# Patient Record
Sex: FEMALE | Race: WHITE | Employment: FULL TIME | URBAN - METROPOLITAN AREA
[De-identification: names, ages, dates, MRNs, and addresses within clinical notes are randomized per-mention and may not be internally consistent; named-entity substitution may affect disease eponyms.]

---

## 2017-01-02 ENCOUNTER — OFFICE VISIT (OUTPATIENT)
Dept: FAMILY MEDICINE | Facility: CLINIC | Age: 47
End: 2017-01-02
Payer: COMMERCIAL

## 2017-01-02 VITALS
TEMPERATURE: 97.8 F | BODY MASS INDEX: 36.6 KG/M2 | WEIGHT: 239 LBS | OXYGEN SATURATION: 96 % | HEART RATE: 106 BPM | SYSTOLIC BLOOD PRESSURE: 134 MMHG | DIASTOLIC BLOOD PRESSURE: 80 MMHG

## 2017-01-02 DIAGNOSIS — E66.01 MORBID OBESITY DUE TO EXCESS CALORIES (H): ICD-10-CM

## 2017-01-02 DIAGNOSIS — E11.9 TYPE 2 DIABETES MELLITUS WITHOUT COMPLICATION, WITH LONG-TERM CURRENT USE OF INSULIN (H): ICD-10-CM

## 2017-01-02 DIAGNOSIS — K76.0 NON-ALCOHOLIC FATTY LIVER DISEASE: ICD-10-CM

## 2017-01-02 DIAGNOSIS — D64.89 ANEMIA DUE TO OTHER CAUSE: ICD-10-CM

## 2017-01-02 DIAGNOSIS — R61 GENERALIZED HYPERHIDROSIS: ICD-10-CM

## 2017-01-02 DIAGNOSIS — F41.9 ANXIETY: ICD-10-CM

## 2017-01-02 DIAGNOSIS — E83.42 HYPOMAGNESEMIA: ICD-10-CM

## 2017-01-02 DIAGNOSIS — G35 MS (MULTIPLE SCLEROSIS) (H): ICD-10-CM

## 2017-01-02 DIAGNOSIS — Z79.4 TYPE 2 DIABETES MELLITUS WITHOUT COMPLICATION, WITH LONG-TERM CURRENT USE OF INSULIN (H): ICD-10-CM

## 2017-01-02 PROCEDURE — 99207 C FOOT EXAM  NO CHARGE: CPT | Performed by: INTERNAL MEDICINE

## 2017-01-02 PROCEDURE — 99214 OFFICE O/P EST MOD 30 MIN: CPT | Performed by: INTERNAL MEDICINE

## 2017-01-02 RX ORDER — TERIFLUNOMIDE 14 MG/1
TABLET, FILM COATED ORAL DAILY
COMMUNITY
End: 2017-10-12

## 2017-01-02 RX ORDER — VENLAFAXINE HYDROCHLORIDE 75 MG/1
75 CAPSULE, EXTENDED RELEASE ORAL DAILY
Qty: 90 CAPSULE | Refills: 3 | Status: SHIPPED | OUTPATIENT
Start: 2017-01-02 | End: 2017-03-24

## 2017-01-02 ASSESSMENT — PAIN SCALES - GENERAL: PAINLEVEL: NO PAIN (0)

## 2017-01-02 NOTE — PROGRESS NOTES
SUBJECTIVE:  Maranda Mallory, a 46 year old female scheduled an appointment to discuss the following issues:  Blood sugars continue to be elevated.  Lowest is 150 and highest is 320  Blood sugar in the am are in the 220 range.  The 150 low would be a couple hours after breakfast.   She is having less weakness in her legs.  Is going to see the [facility] of neurology from now on.  Her MS is definitely improving.  Her anxiety is improved as well.  She worries less.  Is thinking of joining a support group.  She is going to lose weight and exercise.    She will have sudden onset diaphoresis and feel hot.  This has been for 1 month.  Just started new MS med 2 days ago.    Periods are normal.    The patient denies abdominal or flank pain, anorexia, nausea or vomiting, dysphagia, change in bowel habits or black or bloody stools or weight loss.   The patient deniesnof typical reflux symptoms: burning sensation after heavy meals, especially when lying down, sometimes with true waterbrash. Denies dysphagia, black or bloody stools or abdominal pain.     Screening for diabetic peripheral neuropathy  Generalized hyperhidrosis  Elevation of level of transaminase or lactic acid dehydrogenase (LDH)  Non-alcoholic fatty liver disease  Type 2 diabetes mellitus without complication, with long-term current use of insulin (H)  Morbid obesity due to excess calories (H)  Anxiety  MS (multiple sclerosis) (H)  Hypomagnesemia  Anemia due to other cause    Medical, social, surgical, and family histories reviewed by myself.  Past Medical History   Diagnosis Date     Depressive disorder, not elsewhere classified 1995     Sees a therapist but no meds     Urinary tract infection, site not specified      Recurrent UTI's couple times a year     Gestational diabetes      Anxiety      Allergic rhinitis 5/19/2009     Skin Tests- Cat/Dog/DM/T/G/W     Acne      Type II or unspecified type diabetes mellitus without mention of complication, not stated  as uncontrolled      Allergies      Hypertension      borderline     Type 2 diabetes mellitus (H)      diet controlled     Mixed hyperlipidemia      Takes Lipitor     Vitamin D deficiency disease 5/23/2013     Bacterial vaginosis 9/13/2011     Chronic, sometimes with yeast  3/20/12: + BV and + yeast--treated with Metronidazole X 10 days and Diflucan X 3 doses to be taken afterward.  [facility]      Esophageal dysmotility 1/29/2014     Goes to the [facility] - will get food stuck in throat and will need EGD to remove it     Sliding hiatal hernia 1/29/2014     Heart murmur 1/29/2014     High risk HPV infection 2/2016     LSIL +HPV 16     H/O colposcopy with cervical biopsy 3/7/16     LSIL, can't exclude HSIL       Current Outpatient Prescriptions   Medication Sig Dispense Refill     teriflunomide (AUBAGIO) 14 MG tablet Take by mouth daily Only available through select specialty pharmacies       insulin glargine (LANTUS) 100 UNIT/ML injection Inject 22 Units Subcutaneous 2 times daily Patient doesn't need this right now. 3 Month 1     venlafaxine (EFFEXOR-XR) 75 MG 24 hr capsule Take 1 capsule (75 mg) by mouth daily 90 capsule 3     exenatide ER (BYDUREON) 2 MG recon vial kit susp for weekly inj Inject 2 mg Subcutaneous every 7 days 1 kit 1     pantoprazole (PROTONIX) 40 MG EC tablet Take 1 tablet (40 mg) by mouth daily Take 30-60 minutes before a meal. 90 tablet 1     CarBAMazepine (TEGRETOL PO) Take 50 mg by mouth       insulin aspart (NOVOLOG FLEXPEN) 100 UNIT/ML injection Inject 15 Units Subcutaneous 3 times daily (with meals) 3 Month 1     omeprazole (PRILOSEC) 40 MG capsule TAKE ONE CAPSULE BY MOUTH TWICE DAILY 180 capsule 3     rosuvastatin (CRESTOR) 10 MG tablet Take 1 tablet (10 mg) by mouth daily 30 tablet 2     Sennosides-Docusate Sodium (SENNA-PLUS PO) Take by mouth 2 times daily 2 tabs       blood glucose monitoring (NO BRAND SPECIFIED) meter device kit Use to test blood sugar 3 times daily or as directed. 1 kit 11      blood glucose monitoring (NO BRAND SPECIFIED) test strip Use to test blood sugars 3 times daily or as directed 3 Box 11     insulin pen needle 31G X 6 MM Use 4 daily or as directed. 300 each 11     Cholecalciferol (VITAMIN D) 2000 UNITS tablet Take 4,000 Units by mouth daily 180 tablet 3     metFORMIN (GLUCOPHAGE-XR) 500 MG 24 hr tablet Take 2 tablets (1,000 mg) by mouth 2 times daily (with meals) 120 tablet 3     insulin pen needle (BD ULTRA-FINE) 29G X 12.7MM Use once daily with victoza pen 100 each 11     insulin pen needle (BD SAMANTHA U/F) 32G X 4 MM Use once daily or as directed. 100 each 3     blood glucose monitoring (NO BRAND SPECIFIED) meter device kit Use to test blood sugar 1 times daily or as directed.ok for 3 month supply with 3 refills on test strips. 1 kit 11     Urea 20 % CREA To dry and scaly feet twice a day as needed. 120 g 11     ACE NOT PRESCRIBED, INTENTIONAL, ACE Inhibitor not prescribed due to Other:no microalbuminuria 0 each 0     diltiazem (CARDIZEM CD) 120 MG 24 hr CD capsule Take 1 capsule (120 mg) by mouth daily 30 capsule 1     glucose blood VI test strips (FREESTYLE LITE) strip Use to test blood sugars 2 times daily or as directed. 1 Month prn     FREESTYLE LANCETS MISC Use to test blood sugars 2 times daily or as directed. 60 each prn     Blood Glucose Calibration (FREESTYLE CONTROL SOLUTION) LIQD Use to calibrate blood glucose monitor as directed. 1 each 0     aspirin 81 MG tablet Take by mouth daily 30 tablet 3     [DISCONTINUED] insulin glargine (LANTUS) 100 UNIT/ML injection Inject 20 Units Subcutaneous 2 times daily 3 Month 1     [DISCONTINUED] venlafaxine (EFFEXOR-XR) 75 MG 24 hr capsule TAKE ONE CAPSULE BY MOUTH EVERY DAY 30 capsule 3       ROS:   ROS: 10 point ROS neg other than the symptoms noted above in the HPI.   All other systems were reviewed and are negative.    OBJECTIVE:  /80 mmHg  Pulse 106  Temp(Src) 97.8  F (36.6  C) (Oral)  Wt 239 lb (108.41 kg)  SpO2  96%  LMP 12/20/2016 (Exact Date)  Breastfeeding? No  EXAM:  GENERAL APPEARANCE: healthy, alert and no distress  NECK: no adenopathy, no asymmetry, masses, or scars and thyroid normal to palpation  RESP: lungs clear to auscultation - no rales, rhonchi or wheezes  CV: regular rates and rhythm and normal S1 S2, no S3 or S4  ABDOMEN:  soft, nontender, no HSM or masses and bowel sounds normal  SKIN: no suspicious lesions or rashes  PSYCH: mentation appears normal. and affect anxious but dramatically improved from prior visits    Extremities- edema No  1+ dorsalis pedis pulses bilaterally    Office Visit on 11/30/2016   Component Date Value Ref Range Status     Color Urine 11/30/2016 Yellow   Final     Appearance Urine 11/30/2016 Clear   Final     Glucose Urine 11/30/2016 >=1000* NEG mg/dL Final     Bilirubin Urine 11/30/2016 Negative  NEG Final     Ketones Urine 11/30/2016 Trace* NEG mg/dL Final     Specific Gravity Urine 11/30/2016 1.025  1.003 - 1.035 Final     Blood Urine 11/30/2016 Trace* NEG Final     pH Urine 11/30/2016 5.5  5.0 - 7.0 pH Final     Protein Albumin Urine 11/30/2016 Negative  NEG mg/dL Final     Urobilinogen Urine 11/30/2016 0.2  0.2 - 1.0 EU/dL Final     Nitrite Urine 11/30/2016 Negative  NEG Final     Leukocyte Esterase Urine 11/30/2016 Small* NEG Final     Source 11/30/2016 Midstream Urine   Final     Glucose Whole Blood 11/30/2016 363* 70 - 99 mg/dL Final     WBC Urine 11/30/2016 2-5* 0 - 2 /HPF Final     RBC Urine 11/30/2016 O - 2  0 - 2 /HPF Final     Squamous Epithelial /LPF Urine 11/30/2016 Few  FEW /LPF Final     Bacteria Urine 11/30/2016 Moderate* NEG /HPF Final     WBC 11/30/2016 9.6  4.0 - 11.0 10e9/L Final     RBC Count 11/30/2016 4.75  3.8 - 5.2 10e12/L Final     Hemoglobin 11/30/2016 11.5* 11.7 - 15.7 g/dL Final     Hematocrit 11/30/2016 37.3  35.0 - 47.0 % Final     MCV 11/30/2016 79  78 - 100 fl Final     MCH 11/30/2016 24.2* 26.5 - 33.0 pg Final     MCHC 11/30/2016 30.8* 31.5 -  36.5 g/dL Final     RDW 11/30/2016 16.4* 10.0 - 15.0 % Final     Platelet Count 11/30/2016 410  150 - 450 10e9/L Final     Diff Method 11/30/2016 Automated Method   Final     % Neutrophils 11/30/2016 52.8   Final     % Lymphocytes 11/30/2016 37.9   Final     % Monocytes 11/30/2016 6.9   Final     % Eosinophils 11/30/2016 1.8   Final     % Basophils 11/30/2016 0.6   Final     Absolute Neutrophil 11/30/2016 5.1  1.6 - 8.3 10e9/L Final     Absolute Lymphocytes 11/30/2016 3.6  0.8 - 5.3 10e9/L Final     Absolute Monocytes 11/30/2016 0.7  0.0 - 1.3 10e9/L Final     Absolute Eosinophils 11/30/2016 0.2  0.0 - 0.7 10e9/L Final     Absolute Basophils 11/30/2016 0.1  0.0 - 0.2 10e9/L Final   Admission on 11/27/2016, Discharged on 11/27/2016   Component Date Value Ref Range Status     Interpretation ECG 11/27/2016 Click View Image link to view waveform and result   Final     WBC 11/27/2016 11.1* 4.0 - 11.0 10e9/L Final     RBC Count 11/27/2016 4.94  3.8 - 5.2 10e12/L Final     Hemoglobin 11/27/2016 12.1  11.7 - 15.7 g/dL Final     Hematocrit 11/27/2016 38.9  35.0 - 47.0 % Final     MCV 11/27/2016 79  78 - 100 fl Final     MCH 11/27/2016 24.5* 26.5 - 33.0 pg Final     MCHC 11/27/2016 31.1* 31.5 - 36.5 g/dL Final     RDW 11/27/2016 16.0* 10.0 - 15.0 % Final     Platelet Count 11/27/2016 383  150 - 450 10e9/L Final     Diff Method 11/27/2016 Automated Method   Final     % Neutrophils 11/27/2016 56.3   Final     % Lymphocytes 11/27/2016 36.6   Final     % Monocytes 11/27/2016 5.0   Final     % Eosinophils 11/27/2016 1.4   Final     % Basophils 11/27/2016 0.5   Final     % Immature Granulocytes 11/27/2016 0.2   Final     Nucleated RBCs 11/27/2016 0  0 /100 Final     Absolute Neutrophil 11/27/2016 6.3  1.6 - 8.3 10e9/L Final     Absolute Lymphocytes 11/27/2016 4.1  0.8 - 5.3 10e9/L Final     Absolute Monocytes 11/27/2016 0.6  0.0 - 1.3 10e9/L Final     Absolute Eosinophils 11/27/2016 0.2  0.0 - 0.7 10e9/L Final     Absolute  Basophils 11/27/2016 0.1  0.0 - 0.2 10e9/L Final     Abs Immature Granulocytes 11/27/2016 0.0  0 - 0.4 10e9/L Final     Absolute Nucleated RBC 11/27/2016 0.0   Final     Sodium 11/27/2016 136  133 - 144 mmol/L Final     Potassium 11/27/2016 3.8  3.4 - 5.3 mmol/L Final     Chloride 11/27/2016 99  94 - 109 mmol/L Final     Carbon Dioxide 11/27/2016 23  20 - 32 mmol/L Final     Anion Gap 11/27/2016 14  3 - 14 mmol/L Final     Glucose 11/27/2016 416* 70 - 99 mg/dL Final     Urea Nitrogen 11/27/2016 9  7 - 30 mg/dL Final     Creatinine 11/27/2016 0.51* 0.52 - 1.04 mg/dL Final     GFR Estimate 11/27/2016   >60 mL/min/1.7m2 Final                    Value:>90  Non  GFR Calc       GFR Estimate If Black 11/27/2016   >60 mL/min/1.7m2 Final                    Value:>90   GFR Calc       Calcium 11/27/2016 9.1  8.5 - 10.1 mg/dL Final     Bilirubin Direct 11/27/2016 <0.1  0.0 - 0.2 mg/dL Final     Bilirubin Total 11/27/2016 0.5  0.2 - 1.3 mg/dL Final     Albumin 11/27/2016 3.9  3.4 - 5.0 g/dL Final     Protein Total 11/27/2016 8.0  6.8 - 8.8 g/dL Final     Alkaline Phosphatase 11/27/2016 123  40 - 150 U/L Final     ALT 11/27/2016 84* 0 - 50 U/L Final     AST 11/27/2016 66* 0 - 45 U/L Final     INR 11/27/2016 1.03  0.86 - 1.14 Final     Color Urine 11/27/2016 Light Yellow   Final     Appearance Urine 11/27/2016 Slightly Cloudy   Final     Glucose Urine 11/27/2016 >1000* NEG mg/dL Final     Bilirubin Urine 11/27/2016 Negative  NEG Final     Ketones Urine 11/27/2016 40* NEG mg/dL Final     Specific Gravity Urine 11/27/2016 1.030  1.003 - 1.035 Final     Blood Urine 11/27/2016 Negative  NEG Final     pH Urine 11/27/2016 5.5  5.0 - 7.0 pH Final     Protein Albumin Urine 11/27/2016 Negative  NEG mg/dL Final     Urobilinogen mg/dL 11/27/2016 Normal  0.0 - 2.0 mg/dL Final     Nitrite Urine 11/27/2016 Negative  NEG Final     Leukocyte Esterase Urine 11/27/2016 Small* NEG Final     Source 11/27/2016 Midstream  Urine   Final     WBC Urine 11/27/2016 9* 0 - 2 /HPF Final     RBC Urine 11/27/2016 8* 0 - 2 /HPF Final     Squamous Epithelial /HPF Urine 11/27/2016 10* 0 - 1 /HPF Final     Transitional Epi 11/27/2016 <1  0 - 1 /HPF Final     Mucous Urine 11/27/2016 Present* NEG /LPF Final     Specimen Description 11/27/2016 Midstream Urine   Final     Special Requests 11/27/2016 Specimen received in preservative   Final     Culture Micro 11/27/2016    Final                    Value:10,000 to 50,000 colonies/mL mixed urogenital isabell Susceptibility testing not   routinely done       Micro Report Status 11/27/2016 FINAL 11/28/2016   Final     Glucose 11/27/2016 314* 70 - 99 mg/dL Final   Office Visit on 11/14/2016   Component Date Value Ref Range Status     Sodium 11/14/2016 134  133 - 144 mmol/L Final     Potassium 11/14/2016 4.0  3.4 - 5.3 mmol/L Final     Chloride 11/14/2016 101  94 - 109 mmol/L Final     Carbon Dioxide 11/14/2016 25  20 - 32 mmol/L Final     Anion Gap 11/14/2016 8  3 - 14 mmol/L Final     Glucose 11/14/2016 293* 70 - 99 mg/dL Final    Non Fasting     Urea Nitrogen 11/14/2016 10  7 - 30 mg/dL Final     Creatinine 11/14/2016 0.81  0.52 - 1.04 mg/dL Final     GFR Estimate 11/14/2016 77  >60 mL/min/1.7m2 Final    Non  GFR Calc     GFR Estimate If Black 11/14/2016   >60 mL/min/1.7m2 Final                    Value:>90   GFR Calc       Calcium 11/14/2016 9.1  8.5 - 10.1 mg/dL Final     Magnesium 11/14/2016 1.7  1.6 - 2.3 mg/dL Final     Color Urine 11/14/2016 Yellow   Final     Appearance Urine 11/14/2016 Clear   Final     Glucose Urine 11/14/2016 >=1000* NEG mg/dL Final     Bilirubin Urine 11/14/2016 Negative  NEG Final     Ketones Urine 11/14/2016 Negative  NEG mg/dL Final     Specific Gravity Urine 11/14/2016 1.025  1.003 - 1.035 Final     Blood Urine 11/14/2016 Large* NEG Final     pH Urine 11/14/2016 5.5  5.0 - 7.0 pH Final     Protein Albumin Urine 11/14/2016 Negative  NEG mg/dL  Final     Urobilinogen Urine 11/14/2016 0.2  0.2 - 1.0 EU/dL Final     Nitrite Urine 11/14/2016 Negative  NEG Final     Leukocyte Esterase Urine 11/14/2016 Negative  NEG Final     Source 11/14/2016 Midstream Urine   Final     WBC Urine 11/14/2016 O - 2  0 - 2 /HPF Final     RBC Urine 11/14/2016 2-5* 0 - 2 /HPF Final     Hyaline Casts 11/14/2016 O - 2  0 - 2 /LPF Final     Squamous Epithelial /LPF Urine 11/14/2016 Moderate* FEW /LPF Final     Bacteria Urine 11/14/2016 Few* NEG /HPF Final     Mucous Urine 11/14/2016 Present* NEG /LPF Final       ASSESSMENT/PLAN:      (R61) Generalized hyperhidrosis  Comment:   Plan: TSH with free T4 reflex        Unclear etiology - could be related to prior MS drug, hormonal fluctuation, anxiety - monitor for now    (R74.0) Elevation of level of transaminase or lactic acid dehydrogenase (LDH)  Comment:   Plan: Hepatic panel            (K76.0) Non-alcoholic fatty liver disease  Comment: weight loss needed.   Plan:     (E11.9,  Z79.4) Type 2 diabetes mellitus without complication, with long-term current use of insulin (H)  Comment: insulin is contributing to her weight gain and blood sugar are not in good control.  Would benefit from GLP1 agonist but victoza caused too much nausea.  Will see if a different GLP1 agonist works  Plan: Lipid panel reflex to direct LDL, Hemoglobin         A1c, TSH with free T4 reflex, insulin glargine         (LANTUS) 100 UNIT/ML injection, exenatide ER         (BYDUREON) 2 MG recon vial kit susp for weekly         inj, DIABETES EDUCATOR REFERRAL            (E66.01) Morbid obesity due to excess calories (H)  Comment: see above.  Patient will work on diet and exercise  Plan:     (F41.9) Anxiety  Comment: stable.    Plan: venlafaxine (EFFEXOR-XR) 75 MG 24 hr capsule        Thinking of MS support group    (G35) MS (multiple sclerosis) (H)  Comment: per neurology   Plan:     (E83.42) Hypomagnesemia  Comment:   Plan: Magnesium            (D64.89) Anemia due to  other cause  Comment:   Plan: CBC with platelets differential                Patient Instructions     Increase Lantus to 22 units twice daily.  Ni will follow up with you later in the week.  My Fitness Pal or Lose It are great phone apps to help track your food.  Let's start Bydureon weekly after the diabetic educator teaches you how to use it.        [facility]    If you have any questions regarding to your visit please contact your care team:     Team Pink:   Clinic Hours Telephone Number   Internal Medicine:  Dr. Zainab Geller, NP       7am-7pm  Monday - Thursday   7am-5pm  Fridays  [phone]  (Appointment scheduling available 24/7)    Questions about your visit?  Team Line  [phone]   Urgent Care - [place] and [place] - 11am-9pm Monday-Friday Saturday-Sunday- 9am-5pm   [place] - 5pm-9pm Monday-Friday Saturday-Sunday- 9am-5pm  [phone] - [place]   [phone] - [place]       What options do I have for visits at the clinic other than the traditional office visit?  To expand how we care for you, many of our providers are utilizing electronic visits (e-visits) and telephone visits, when medically appropriate, for interactions with their patients rather than a visit in the clinic.   We also offer nurse visits for many medical concerns. Just like any other service, we will bill your insurance company for this type of visit based on time spent on the phone with your provider. Not all insurance companies cover these visits. Please check with your medical insurance if this type of visit is covered. You will be responsible for any charges that are not paid by your insurance.      E-visits via [employer]:  generally incur a $35.00 fee.  Telephone visits:  Time spent on the phone: *charged based on time that is spent on the phone in increments of 10 minutes. Estimated cost:   5-10 mins $30.00   11-20 mins. $59.00   21-30 mins. $85.00    Use [employer] (secure email communication and access to your chart) to send your primary care provider a message or make an appointment. Ask someone on your Team how to sign up for [employer].    For a Price Quote for your services, please call our Consumer Price Line at [phone].    As always, Thank you for trusting us with your health care needs!    Lisa Kee, CMA

## 2017-01-02 NOTE — MR AVS SNAPSHOT
After Visit Summary   1/2/2017    Maranda Mallory    MRN: [id number]           Patient Information     Date Of Birth          1970        Visit Information        Provider Department      1/2/2017 4:00 PM Zainab Aguilar MD [facility]        Today's Diagnoses     Screening for diabetic peripheral neuropathy    -  1     Generalized hyperhidrosis         Elevation of level of transaminase or lactic acid dehydrogenase (LDH)         Non-alcoholic fatty liver disease         Type 2 diabetes mellitus without complication, with long-term current use of insulin (H)         Morbid obesity due to excess calories (H)         Anxiety         MS (multiple sclerosis) (H)         Hypomagnesemia         Anemia due to other cause           Care Instructions    Increase Lantus to 22 units twice daily.  Ni will follow up with you later in the week.  My Fitness Pal or Lose It are great phone apps to help track your food.  Let's start Bydureon weekly after the diabetic educator teaches you how to use it.        [facility]    If you have any questions regarding to your visit please contact your care team:     Team Pink:   Clinic Hours Telephone Number   Internal Medicine:  Dr. Zainab Geller NP       7am-7pm  Monday - Thursday   7am-5pm  Fridays  [phone]  (Appointment scheduling available 24/7)    Questions about your visit?  Team Line  [phone]   Urgent Care - [place] - 11am-9pm Monday-Friday Saturday-Sunday- 9am-5pm   [place] - 5pm-9pm Monday-Friday Saturday-Sunday- 9am-5pm  [phone] - [place]  [phone] - [place]       What options do I have for visits at the clinic other than the traditional office visit?  To expand how we care for you, many of our providers are utilizing electronic visits (e-visits) and telephone visits, when medically appropriate, for interactions with their patients rather  than a visit in the clinic.   We also offer nurse visits for many medical concerns. Just like any other service, we will bill your insurance company for this type of visit based on time spent on the phone with your provider. Not all insurance companies cover these visits. Please check with your medical insurance if this type of visit is covered. You will be responsible for any charges that are not paid by your insurance.      E-visits via Gourmet Originshart:  generally incur a $35.00 fee.  Telephone visits:  Time spent on the phone: *charged based on time that is spent on the phone in increments of 10 minutes. Estimated cost:   5-10 mins $30.00   11-20 mins. $59.00   21-30 mins. $85.00   Use Insitu Mobile (secure email communication and access to your chart) to send your primary care provider a message or make an appointment. Ask someone on your Team how to sign up for Insitu Mobile.    For a Price Quote for your services, please call our 1d4 Pty Line at [phone].    As always, Thank you for trusting us with your health care needs!    Lisa Kee, [facility]          Follow-ups after your visit        Additional Services     DIABETES EDUCATOR REFERRAL       DIABETES SELF MANAGEMENT TRAINING (DSMT)      Your provider has referred you to Diabetes Education: [facility]: Diabetes Education - All [facility] [phone]   [email or web address]    Type of training and number of hours: Previous Diagnosis: Follow-up DSMT - 2 hours.    Medicare covers: 10 hours of initial DSMT in 12 month period from the time of first visit, plus 2 hours of follow-up DSMT annually, and additional hours as requested for insulin training.    Diabetes Type: Type 2 - On Insulin             Diabetes Co-Morbidities: none               A1C Goal:  <7.0       A1C is: A1C      9.0   9/17/2016    If an urgent visit is needed or A1C is above 12, Care Team to call the Diabetes Education Team at [phone] or send an In  Basket message to the Diabetes Education Pool (P DIAB ED-PATIENT CARE).    Diabetes Education Topics: Medication Start: GLP-1: Type/Dose/Timing: Bydureon weekly    Special Educational Needs Requiring Individual DSMT: None       MEDICAL NUTRITION THERAPY (MNT) for Diabetes    Medical Nutrition Therapy with a Registered Dietitian can be provided in coordination with Diabetes Self-Management Training to assist in achieving optimal diabetes management.    MNT Type and Hours: Previous diagnosis: Annual follow-up MNT - 2 hours                       Medicare will cover: 3 hours initial MNT in 12 month period after first visit, plus 2 hours of follow-up MNT annually    Please be aware that coverage of these services is subject to the terms and limitations of your health insurance plan.  Call member services at your health plan to determine Diabetes Self-Management Training benefits and ask which blood glucose monitor brands are covered by your plan.      Please bring the following with you to your appointment:    (1)  List of current medications   (2)  List of Blood Glucose Monitor brands that are covered by your insurance plan  (3)  Blood Glucose Monitor and log book  (4)   Food records for the 3 days prior to your visit    The Certified Diabetes Educator may make diabetes medication adjustments per the CDE Protocol and Collaborative Practice Agreement.                  Your next 10 appointments already scheduled     [place] 10, 2017  8:00 PM   PSG Split with SLEEP STUDY RM 2   [facility] [place], Sleep Study ([facility])    [address] [place] [id number] [phone]            Jan 13, 2017  9:00 AM   Diabetic Education with BK DIABETIC ED RESOURCE   [facility] ([facility])    [address] [facility] [id number] [phone]            Jan 25, 2017  9:00 AM   New Visit with Vannesa Reynoso OD   [facility]  [place] ([facility])    [address] [facility] [address]   [phone]            Jan 25, 2017 11:20 AM   Return Sleep Patient with Juan Gordillo MD   [facility] [place], Sleep Study ([facility])    [address] [facility] [address]   [phone]            Feb 08, 2017  3:15 PM   PHYSICAL with Patrica Rhodes MD   [facility] ([facility])    [address] [place] [address]  [address]  [facility] [address]   [phone]            Jun 08, 2017  1:00 PM   (Arrive by 12:45 PM)   MR BRAIN W/O & W CONTRAST with [address] MRI ([facility])    [address] [id number] [phone]           Take your medicines as usual, unless your doctor tells you not to. Bring a list of your current medicines to your exam (including vitamins, minerals and over-the-counter drugs).  You will be given intravenous contrast for this exam. To prepare:   The day before your exam, drink extra fluids at least six 8-ounce glasses (unless your doctor tells you to restrict your fluids).   Have a blood test (creatinine test) within 30 days of your exam. Go to your clinic or Diagnostic Imaging Department for this test.  The MRI machine uses a strong magnet. Please wear clothes without metal (snaps, zippers). A sweatsuit works well, or we may give you a hospital gown.  Please remove any body piercings and hair extensions before you arrive. You will also remove watches, jewelry, hairpins, wallets, dentures, partial dental plates and hearing aids. You may wear contact lenses, and you may be able to wear your rings. We have a safe place to keep your personal items, but it is safer to leave them at home.   **IMPORTANT** THE INSTRUCTIONS BELOW ARE ONLY FOR THOSE PATIENTS WHO HAVE BEEN TOLD THEY WILL RECEIVE SEDATION OR GENERAL ANESTHESIA DURING THEIR  MRI PROCEDURE:  IF YOU WILL RECEIVE SEDATION (take medicine to help you relax during your exam):   You must get the medicine from your doctor before you arrive. Bring the medicine to the exam. Do not take it at home.   Arrive one hour early. Bring someone who can take you home after the test. Your medicine will make you sleepy. After the exam, you may not drive, take a bus or take a taxi by yourself.   No eating 8 hours before your exam. You may have clear liquids up until 4 hours before your exam. (Clear liquids include water, clear tea, black coffee and fruit juice without pulp.)  IF YOU WILL RECEIVE ANESTHESIA (be asleep for your exam):   Arrive 1 1/2 hours early. Bring someone who can take you home after the test. You may not drive, take a bus or take a taxi by yourself.   No eating 8 hours before your exam. You may have clear liquids up until 4 hours before your exam. (Clear liquids include water, clear tea, black coffee and fruit juice without pulp.)  Please call the Imaging Department at your exam site with any questions.            Jun 08, 2017  1:45 PM   (Arrive by 1:30 PM)   MR CERVICAL SPINE W/O & W CONTRAST with [address] [facility] MRI ([facility])    [address] [address] [phone]           Take your medicines as usual, unless your doctor tells you not to. Bring a list of your current medicines to your exam (including vitamins, minerals and over-the-counter drugs).  You will be given intravenous contrast for this exam. To prepare:   The day before your exam, drink extra fluids at least six 8-ounce glasses (unless your doctor tells you to restrict your fluids).   Have a blood test (creatinine test) within 30 days of your exam. Go to your clinic or Diagnostic Imaging Department for this test.  The MRI machine uses a strong magnet. Please wear clothes without metal (snaps, zippers). A sweatsuit works well, or we may give you a  hospital gown.  Please remove any body piercings and hair extensions before you arrive. You will also remove watches, jewelry, hairpins, wallets, dentures, partial dental plates and hearing aids. You may wear contact lenses, and you may be able to wear your rings. We have a safe place to keep your personal items, but it is safer to leave them at home.   **IMPORTANT** THE INSTRUCTIONS BELOW ARE ONLY FOR THOSE PATIENTS WHO HAVE BEEN TOLD THEY WILL RECEIVE SEDATION OR GENERAL ANESTHESIA DURING THEIR MRI PROCEDURE:  IF YOU WILL RECEIVE SEDATION (take medicine to help you relax during your exam):   You must get the medicine from your doctor before you arrive. Bring the medicine to the exam. Do not take it at home.   Arrive one hour early. Bring someone who can take you home after the test. Your medicine will make you sleepy. After the exam, you may not drive, take a bus or take a taxi by yourself.   No eating 8 hours before your exam. You may have clear liquids up until 4 hours before your exam. (Clear liquids include water, clear tea, black coffee and fruit juice without pulp.)  IF YOU WILL RECEIVE ANESTHESIA (be asleep for your exam):   Arrive 1 1/2 hours early. Bring someone who can take you home after the test. You may not drive, take a bus or take a taxi by yourself.   No eating 8 hours before your exam. You may have clear liquids up until 4 hours before your exam. (Clear liquids include water, clear tea, black coffee and fruit juice without pulp.)  Please call the Imaging Department at your exam site with any questions.            Jun 08, 2017  2:30 PM   (Arrive by 2:15 PM)   Return Multiple Sclerosis with Carlos Hunt MD   [facility] Multiple Sclerosis ([facility])    [address] [facility]  [address] [facility] [address] [phone]              Future tests that were ordered for you today     Open Future Orders        Priority Expected Expires Ordered    Lipid panel reflex  to direct LDL Routine 1/9/2017 1/2/2018 1/2/2017    CBC with platelets differential Routine 1/9/2017 1/2/2018 1/2/2017    Hemoglobin A1c Routine 1/9/2017 1/2/2018 1/2/2017    TSH with free T4 reflex Routine 1/9/2017 1/2/2018 1/2/2017    Hepatic panel Routine 1/9/2017 1/2/2018 1/2/2017    Magnesium Routine 1/9/2017 1/2/2018 1/2/2017            Who to contact     If you have questions or need follow up information about today's clinic visit or your schedule please contact [facility] directly at [phone].  Normal or non-critical lab and imaging results will be communicated to you by LOGIC DEVICEShart, letter or phone within 4 business days after the clinic has received the results. If you do not hear from us within 7 days, please contact the clinic through Enhatcht or phone. If you have a critical or abnormal lab result, we will notify you by phone as soon as possible.  Submit refill requests through Link Trigger or call your pharmacy and they will forward the refill request to us. Please allow 3 business days for your refill to be completed.          Additional Information About Your Visit        LOGIC DEVICESharSeanodes Information     Link Trigger gives you secure access to your electronic health record. If you see a primary care provider, you can also send messages to your care team and make appointments. If you have questions, please call your primary care clinic.  If you do not have a primary care provider, please call [phone] and they will assist you.        Your Vitals Were     Pulse Temperature Pulse Oximetry Last Period Breastfeeding?       106 97.8  F (36.6  C) (Oral) 96% 12/20/2016 (Exact Date) No        Blood Pressure from Last 3 Encounters:   01/02/17 134/80   12/08/16 129/74   12/08/16 122/78    Weight from Last 3 Encounters:   01/02/17 239 lb (108.41 kg)   12/08/16 237 lb (107.502 kg)   12/08/16 237 lb (107.502 kg)              We Performed the Following     DIABETES EDUCATOR REFERRAL     FOOT EXAM  NO CHARGE  [77415.114]          Today's Medication Changes          These changes are accurate as of: 1/2/17  4:41 PM.  If you have any questions, ask your nurse or doctor.               Start taking these medicines.        Dose/Directions    exenatide ER 2 MG recon vial kit susp for weekly inj   Commonly known as:  BYDUREON   Used for:  Type 2 diabetes mellitus without complication, with long-term current use of insulin (H)   Started by:  Zainab Aguilar MD        Dose:  2 mg   Inject 2 mg Subcutaneous every 7 days   Quantity:  1 kit   Refills:  1         These medicines have changed or have updated prescriptions.        Dose/Directions    insulin glargine 100 UNIT/ML injection   Commonly known as:  LANTUS   This may have changed:    - how much to take  - additional instructions   Used for:  Type 2 diabetes mellitus without complication, with long-term current use of insulin (H)   Changed by:  Zainab Aguilar MD        Dose:  22 Units   Inject 22 Units Subcutaneous 2 times daily Patient doesn't need this right now.   Quantity:  3 Month   Refills:  1       venlafaxine 75 MG 24 hr capsule   Commonly known as:  EFFEXOR-XR   This may have changed:  See the new instructions.   Used for:  Anxiety   Changed by:  Zainab Aguilar MD        Dose:  75 mg   Take 1 capsule (75 mg) by mouth daily   Quantity:  90 capsule   Refills:  3            Where to get your medicines      [facility] [address] [place] [address] [facility] [address] [address]     Phone:  [phone]    - exenatide ER 2 MG recon vial kit susp for weekly inj  - insulin glargine 100 UNIT/ML injection  - venlafaxine 75 MG 24 hr capsule             Primary Care Provider Office Phone # Fax #    Zainab Aguilar [phone]       [address] [address]        Thank you!     Thank you for choosing  [facility]  for your care. Our goal is always to provide you with excellent care. Hearing back from our patients is one way we can continue to improve our services. Please take a few minutes to complete the written survey that you may receive in the mail after your visit with us. Thank you!             Your Updated Medication List - Protect others around you: Learn how to safely use, store and throw away your medicines at www.disposemymeds.org.          This list is accurate as of: 1/2/17  4:41 PM.  Always use your most recent med list.                   Brand Name Dispense Instructions for use    ACE NOT PRESCRIBED (INTENTIONAL)     0 each    ACE Inhibitor not prescribed due to Other:no microalbuminuria       aspirin 81 MG tablet     30 tablet    Take by mouth daily       AUBAGIO 14 MG tablet   Generic drug:  teriflunomide      Take by mouth daily Only available through select specialty pharmacies       blood glucose calibration solution     1 each    Use to calibrate blood glucose monitor as directed.       blood glucose monitoring lancets     60 each    Use to test blood sugars 2 times daily or as directed.       * blood glucose monitoring meter device kit    no brand specified    1 kit    Use to test blood sugar 1 times daily or as directed.ok for 3 month supply with 3 refills on test strips.       * blood glucose monitoring meter device kit    no brand specified    1 kit    Use to test blood sugar 3 times daily or as directed.       * blood glucose monitoring test strip    FREESTYLE LITE    1 Month    Use to test blood sugars 2 times daily or as directed.       * blood glucose monitoring test strip    no brand specified    3 Box    Use to test blood sugars 3 times daily or as directed       diltiazem 120 MG 24 hr capsule    CARDIZEM CD    30 capsule    Take 1 capsule (120 mg) by mouth daily       exenatide ER 2 MG recon vial kit susp for weekly inj    BYDUREON    1 kit    Inject 2 mg Subcutaneous  every 7 days       insulin aspart 100 UNIT/ML injection    NovoLOG FLEXPEN    3 Month    Inject 15 Units Subcutaneous 3 times daily (with meals)       insulin glargine 100 UNIT/ML injection    LANTUS    3 Month    Inject 22 Units Subcutaneous 2 times daily Patient doesn't need this right now.       * insulin pen needle 32G X 4 MM    BD SAMANTHA U/F    100 each    Use once daily or as directed.       * insulin pen needle 29G X 12.7MM    BD ULTRA-FINE    100 each    Use once daily with victoza pen       * insulin pen needle 31G X 6 MM     300 each    Use 4 daily or as directed.       metFORMIN 500 MG 24 hr tablet    GLUCOPHAGE-XR    120 tablet    Take 2 tablets (1,000 mg) by mouth 2 times daily (with meals)       omeprazole 40 MG capsule    priLOSEC    180 capsule    TAKE ONE CAPSULE BY MOUTH TWICE DAILY       pantoprazole 40 MG EC tablet    PROTONIX    90 tablet    Take 1 tablet (40 mg) by mouth daily Take 30-60 minutes before a meal.       rosuvastatin 10 MG tablet    CRESTOR    30 tablet    Take 1 tablet (10 mg) by mouth daily       SENNA-PLUS PO      Take by mouth 2 times daily 2 tabs       TEGRETOL PO      Take 50 mg by mouth       Urea 20 % Crea     120 g    To dry and scaly feet twice a day as needed.       venlafaxine 75 MG 24 hr capsule    EFFEXOR-XR    90 capsule    Take 1 capsule (75 mg) by mouth daily       vitamin D 2000 UNITS tablet     180 tablet    Take 4,000 Units by mouth daily       * Notice:  This list has 7 medication(s) that are the same as other medications prescribed for you. Read the directions carefully, and ask your doctor or other care provider to review them with you.

## 2017-01-02 NOTE — PATIENT INSTRUCTIONS
Increase Lantus to 22 units twice daily.  Ni will follow up with you later in the week.  My Fitness Pal or Lose It are great phone apps to help track your food.  Let's start Bydureon weekly after the diabetic educator teaches you how to use it.        [facility]    If you have any questions regarding to your visit please contact your care team:     Team Pink:   Clinic Hours Telephone Number   Internal Medicine:  Dr. Zainab Geller NP       7am-7pm  Monday - Thursday   7am-5pm  Fridays  [phone]  (Appointment scheduling available 24/7)    Questions about your visit?  Team Line  [phone]   Urgent [facility] - [place] [facility] - 11am-9pm Monday-Friday Saturday-Sunday- 9am-5pm   [place] - 5pm-9pm Monday-Friday Saturday-Sunday- 9am-5pm  [phone] - [place]   [phone] - [place]       What options do I have for visits at the clinic other than the traditional office visit?  To expand how we care for you, many of our providers are utilizing electronic visits (e-visits) and telephone visits, when medically appropriate, for interactions with their patients rather than a visit in the clinic.   We also offer nurse visits for many medical concerns. Just like any other service, we will bill your insurance company for this type of visit based on time spent on the phone with your provider. Not all insurance companies cover these visits. Please check with your medical insurance if this type of visit is covered. You will be responsible for any charges that are not paid by your insurance.      E-visits via Accuri Cytometers:  generally incur a $35.00 fee.  Telephone visits:  Time spent on the phone: *charged based on time that is spent on the phone in increments of 10 minutes. Estimated cost:   5-10 mins $30.00   11-20 mins. $59.00   21-30 mins. $85.00   Use Accuri Cytometers (secure email communication and access to your chart) to send your primary care provider a  message or make an appointment. Ask someone on your Team how to sign up for RxAdvancet.    For a Price Quote for your services, please call our Consumer Price Line at [phone].    As always, Thank you for trusting us with your health care needs!    Lias Kee CMA

## 2017-01-05 DIAGNOSIS — B37.31 CANDIDIASIS OF VULVA AND VAGINA: Primary | ICD-10-CM

## 2017-01-06 ENCOUNTER — TELEPHONE (OUTPATIENT)
Dept: EDUCATION SERVICES | Facility: CLINIC | Age: 47
End: 2017-01-06

## 2017-01-06 NOTE — TELEPHONE ENCOUNTER
Called out to patient for blood sugar review and insulin titration.  Left a message and will call again later.     Perla Armstrong RD, LD   Diabetes Education

## 2017-01-06 NOTE — TELEPHONE ENCOUNTER
Called out to patient on mobile and home phone numbers.  Left a message and gave triage line phone number.      Perla Armstrong RD, LD   Diabetes Education

## 2017-01-06 NOTE — TELEPHONE ENCOUNTER
Diflucan       Last Written Prescription Date: 11/30/2016  Last Fill Quantity: 1,  # refills: 1   Last Office Visit with [facility], [facility] or [facility] prescribing provider: 01/02/2017                                         Next 5 appointments (look out 90 days)     Feb 08, 2017  3:15 PM   PHYSICAL with Patrica Rhodes MD   [facility] ([facility])    [address] [place] [address] [phone]

## 2017-01-06 NOTE — TELEPHONE ENCOUNTER
Called out to patient on cell phone number (listed as best number) and no answer.  Went to voicemail.  Will follow up again next week.     Perla Armstrong RD, LD   Diabetes Education

## 2017-01-06 NOTE — TELEPHONE ENCOUNTER
Diflucan       Last Written Prescription Date: 11/30/2016  Last Fill Quantity: 1,  # refills: 1   Last Office Visit with [facility], [facility] or [facility] prescribing provider: 08/24/2016                                         Next 5 appointments (look out 90 days)     Feb 08, 2017  3:15 PM   PHYSICAL with Patrica Rhodes MD   [facility] ([facility])    [address] [place] [address] [phone]

## 2017-01-07 DIAGNOSIS — Z79.4 TYPE 2 DIABETES MELLITUS WITHOUT COMPLICATION, WITH LONG-TERM CURRENT USE OF INSULIN (H): ICD-10-CM

## 2017-01-07 DIAGNOSIS — R61 GENERALIZED HYPERHIDROSIS: ICD-10-CM

## 2017-01-07 DIAGNOSIS — E11.9 TYPE 2 DIABETES MELLITUS WITHOUT COMPLICATION, WITH LONG-TERM CURRENT USE OF INSULIN (H): ICD-10-CM

## 2017-01-07 DIAGNOSIS — E83.42 HYPOMAGNESEMIA: ICD-10-CM

## 2017-01-07 DIAGNOSIS — D64.89 ANEMIA DUE TO OTHER CAUSE: ICD-10-CM

## 2017-01-07 LAB
BASOPHILS # BLD AUTO: 0.1 10E9/L (ref 0–0.2)
BASOPHILS NFR BLD AUTO: 0.6 %
DIFFERENTIAL METHOD BLD: ABNORMAL
EOSINOPHIL # BLD AUTO: 0.2 10E9/L (ref 0–0.7)
EOSINOPHIL NFR BLD AUTO: 2 %
ERYTHROCYTE [DISTWIDTH] IN BLOOD BY AUTOMATED COUNT: 16.6 % (ref 10–15)
HBA1C MFR BLD: 9.4 % (ref 4.3–6)
HCT VFR BLD AUTO: 34 % (ref 35–47)
HGB BLD-MCNC: 10.7 G/DL (ref 11.7–15.7)
LYMPHOCYTES # BLD AUTO: 2.8 10E9/L (ref 0.8–5.3)
LYMPHOCYTES NFR BLD AUTO: 32.8 %
MCH RBC QN AUTO: 24.5 PG (ref 26.5–33)
MCHC RBC AUTO-ENTMCNC: 31.5 G/DL (ref 31.5–36.5)
MCV RBC AUTO: 78 FL (ref 78–100)
MONOCYTES # BLD AUTO: 0.7 10E9/L (ref 0–1.3)
MONOCYTES NFR BLD AUTO: 7.5 %
NEUTROPHILS # BLD AUTO: 4.9 10E9/L (ref 1.6–8.3)
NEUTROPHILS NFR BLD AUTO: 57.1 %
PLATELET # BLD AUTO: 347 10E9/L (ref 150–450)
RBC # BLD AUTO: 4.36 10E12/L (ref 3.8–5.2)
WBC # BLD AUTO: 8.6 10E9/L (ref 4–11)

## 2017-01-07 PROCEDURE — 84443 ASSAY THYROID STIM HORMONE: CPT | Performed by: INTERNAL MEDICINE

## 2017-01-07 PROCEDURE — 85025 COMPLETE CBC W/AUTO DIFF WBC: CPT | Performed by: INTERNAL MEDICINE

## 2017-01-07 PROCEDURE — 80076 HEPATIC FUNCTION PANEL: CPT | Performed by: INTERNAL MEDICINE

## 2017-01-07 PROCEDURE — 83036 HEMOGLOBIN GLYCOSYLATED A1C: CPT | Performed by: INTERNAL MEDICINE

## 2017-01-07 PROCEDURE — 83735 ASSAY OF MAGNESIUM: CPT | Performed by: INTERNAL MEDICINE

## 2017-01-07 PROCEDURE — 80061 LIPID PANEL: CPT | Performed by: INTERNAL MEDICINE

## 2017-01-07 PROCEDURE — 36415 COLL VENOUS BLD VENIPUNCTURE: CPT | Performed by: INTERNAL MEDICINE

## 2017-01-08 RX ORDER — FLUCONAZOLE 150 MG/1
TABLET ORAL
Qty: 1 TABLET | Refills: 0 | OUTPATIENT
Start: 2017-01-08

## 2017-01-08 RX ORDER — FLUCONAZOLE 150 MG/1
TABLET ORAL
Qty: 1 TABLET | Refills: 1 | Status: SHIPPED | OUTPATIENT
Start: 2017-01-08 | End: 2017-04-24

## 2017-01-08 NOTE — TELEPHONE ENCOUNTER
Prescription approved per [facility] Refill Protocol.  Rocio Church RN

## 2017-01-09 LAB
ALBUMIN SERPL-MCNC: 3.5 G/DL (ref 3.4–5)
ALP SERPL-CCNC: 139 U/L (ref 40–150)
ALT SERPL W P-5'-P-CCNC: 42 U/L (ref 0–50)
AST SERPL W P-5'-P-CCNC: 49 U/L (ref 0–45)
BILIRUB DIRECT SERPL-MCNC: <0.1 MG/DL (ref 0–0.2)
BILIRUB SERPL-MCNC: 0.2 MG/DL (ref 0.2–1.3)
CHOLEST SERPL-MCNC: 165 MG/DL
HDLC SERPL-MCNC: 57 MG/DL
LDLC SERPL CALC-MCNC: 62 MG/DL
MAGNESIUM SERPL-MCNC: 1.6 MG/DL (ref 1.6–2.3)
NONHDLC SERPL-MCNC: 108 MG/DL
PROT SERPL-MCNC: 7.3 G/DL (ref 6.8–8.8)
TRIGL SERPL-MCNC: 232 MG/DL
TSH SERPL DL<=0.005 MIU/L-ACNC: 1.39 MU/L (ref 0.4–4)

## 2017-01-09 RX ORDER — FLUCONAZOLE 150 MG/1
TABLET ORAL
Qty: 1 TABLET | Refills: 0 | OUTPATIENT
Start: 2017-01-09

## 2017-01-09 NOTE — TELEPHONE ENCOUNTER
fluconazole (DIFLUCAN) 150 MG tablet      Last Written Prescription Date: 1/8/2017  Last Fill Quantity: 1,  # refills: 1   Last Office Visit with [facility], [facility] or [facility] prescribing provider: 1/2/2017                                         Next 5 appointments (look out 90 days)     Feb 08, 2017  3:15 PM   PHYSICAL with Patrica Rhodes MD   [facility] ([facility])    [address] [place] [id number] [phone]

## 2017-01-10 ENCOUNTER — TELEPHONE (OUTPATIENT)
Dept: EDUCATION SERVICES | Facility: CLINIC | Age: 47
End: 2017-01-10

## 2017-01-10 DIAGNOSIS — D64.89 ANEMIA DUE TO OTHER CAUSE: ICD-10-CM

## 2017-01-10 DIAGNOSIS — E11.9 TYPE 2 DIABETES MELLITUS WITHOUT COMPLICATION (H): ICD-10-CM

## 2017-01-10 DIAGNOSIS — K76.0 NON-ALCOHOLIC FATTY LIVER DISEASE: ICD-10-CM

## 2017-01-10 LAB
ALT SERPL W P-5'-P-CCNC: 38 U/L (ref 0–50)
AST SERPL W P-5'-P-CCNC: 37 U/L (ref 0–45)
BASOPHILS # BLD AUTO: 0.1 10E9/L (ref 0–0.2)
BASOPHILS NFR BLD AUTO: 0.5 %
CHOLEST SERPL-MCNC: 192 MG/DL
DIFFERENTIAL METHOD BLD: ABNORMAL
EOSINOPHIL # BLD AUTO: 0.3 10E9/L (ref 0–0.7)
EOSINOPHIL NFR BLD AUTO: 2.7 %
ERYTHROCYTE [DISTWIDTH] IN BLOOD BY AUTOMATED COUNT: 17.1 % (ref 10–15)
FERRITIN SERPL-MCNC: 7 NG/ML (ref 12–150)
HBA1C MFR BLD: 9.2 % (ref 4.3–6)
HCT VFR BLD AUTO: 35.7 % (ref 35–47)
HDLC SERPL-MCNC: 51 MG/DL
HGB BLD-MCNC: 11 G/DL (ref 11.7–15.7)
IRON SATN MFR SERPL: 5 % (ref 15–46)
IRON SERPL-MCNC: 24 UG/DL (ref 35–180)
LDLC SERPL CALC-MCNC: 78 MG/DL
LYMPHOCYTES # BLD AUTO: 3.5 10E9/L (ref 0.8–5.3)
LYMPHOCYTES NFR BLD AUTO: 32 %
MCH RBC QN AUTO: 23.7 PG (ref 26.5–33)
MCHC RBC AUTO-ENTMCNC: 30.8 G/DL (ref 31.5–36.5)
MCV RBC AUTO: 77 FL (ref 78–100)
MONOCYTES # BLD AUTO: 0.8 10E9/L (ref 0–1.3)
MONOCYTES NFR BLD AUTO: 7.4 %
NEUTROPHILS # BLD AUTO: 6.3 10E9/L (ref 1.6–8.3)
NEUTROPHILS NFR BLD AUTO: 57.4 %
NONHDLC SERPL-MCNC: 141 MG/DL
PLATELET # BLD AUTO: 413 10E9/L (ref 150–450)
RBC # BLD AUTO: 4.64 10E12/L (ref 3.8–5.2)
RETICS # AUTO: 82 10E9/L (ref 25–95)
RETICS/RBC NFR AUTO: 1.8 % (ref 0.5–2)
TIBC SERPL-MCNC: 496 UG/DL (ref 240–430)
TRIGL SERPL-MCNC: 316 MG/DL
TSH SERPL DL<=0.005 MIU/L-ACNC: 1.27 MU/L (ref 0.4–4)
VIT B12 SERPL-MCNC: 385 PG/ML (ref 193–986)
WBC # BLD AUTO: 10.9 10E9/L (ref 4–11)

## 2017-01-10 PROCEDURE — 85045 AUTOMATED RETICULOCYTE COUNT: CPT | Performed by: INTERNAL MEDICINE

## 2017-01-10 PROCEDURE — 82607 VITAMIN B-12: CPT | Performed by: INTERNAL MEDICINE

## 2017-01-10 PROCEDURE — 83010 ASSAY OF HAPTOGLOBIN QUANT: CPT | Performed by: INTERNAL MEDICINE

## 2017-01-10 PROCEDURE — 84450 TRANSFERASE (AST) (SGOT): CPT | Performed by: INTERNAL MEDICINE

## 2017-01-10 PROCEDURE — 83550 IRON BINDING TEST: CPT | Performed by: INTERNAL MEDICINE

## 2017-01-10 PROCEDURE — 84443 ASSAY THYROID STIM HORMONE: CPT | Performed by: INTERNAL MEDICINE

## 2017-01-10 PROCEDURE — 84460 ALANINE AMINO (ALT) (SGPT): CPT | Performed by: INTERNAL MEDICINE

## 2017-01-10 PROCEDURE — 82728 ASSAY OF FERRITIN: CPT | Performed by: INTERNAL MEDICINE

## 2017-01-10 PROCEDURE — 85025 COMPLETE CBC W/AUTO DIFF WBC: CPT | Performed by: INTERNAL MEDICINE

## 2017-01-10 PROCEDURE — 82746 ASSAY OF FOLIC ACID SERUM: CPT | Performed by: INTERNAL MEDICINE

## 2017-01-10 PROCEDURE — 36415 COLL VENOUS BLD VENIPUNCTURE: CPT | Performed by: INTERNAL MEDICINE

## 2017-01-10 PROCEDURE — 83540 ASSAY OF IRON: CPT | Performed by: INTERNAL MEDICINE

## 2017-01-10 PROCEDURE — 85060 BLOOD SMEAR INTERPRETATION: CPT | Performed by: INTERNAL MEDICINE

## 2017-01-10 PROCEDURE — 80061 LIPID PANEL: CPT | Performed by: INTERNAL MEDICINE

## 2017-01-10 NOTE — TELEPHONE ENCOUNTER
Called out to patient to review blood sugars.  Was able to get a hold of patient this date.  She does not have her blood sugar log on her and doesn't have much time to talk (is busy with work).  She will bring everything to her appointment this Friday. 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  She is taking the Lantus 22-0-0-22, which was increased 1/02/17.  Patient is also on Novoolog 15-15-15-0 and reports missing this daily at lunch due to being to busy.    Will review basal/bolus insulin and blood sugars on Friday.      Perla Armstrong RD, LD   Diabetes Education

## 2017-01-11 ENCOUNTER — TELEPHONE (OUTPATIENT)
Dept: FAMILY MEDICINE | Facility: CLINIC | Age: 47
End: 2017-01-11

## 2017-01-11 LAB
COPATH REPORT: NORMAL
FOLATE SERPL-MCNC: 12.5 NG/ML
HAPTOGLOB SERPL-MCNC: 237 MG/DL (ref 15–200)

## 2017-01-11 RX ORDER — FLUCONAZOLE 150 MG/1
TABLET ORAL
Qty: 1 TABLET | Refills: 0
Start: 2017-01-11

## 2017-01-11 NOTE — TELEPHONE ENCOUNTER
Received fax from pharmacy stating patient requires Prior Authorization for Lantus Solostar pen inj 5 x 3ml.     Insurance information:   Name: [employer]  Phone number: [phone]   ID number:[id number]     Initiate prior authorization or change medication?    If a prior authorization is to be initiated, please list the following:    -any medications the patient has tried and failed or any contraindications.  -is the patient currently on this medication, or has tried before?  -What is the diagnosis?  -Justification or other information that may be helpful.

## 2017-01-11 NOTE — TELEPHONE ENCOUNTER
"Spoke with pharmacy.   Med does not come in generic.   That was just a \"generic fax\" they sent. It should have said med was not covered.  Needs PA    They will send over the PA request    Nguyễn Howe RN    "

## 2017-01-11 NOTE — TELEPHONE ENCOUNTER
Received fax from [employer] requesting Generic form of Bydureion 2mg vial (once weekly) 4tray. Please advise.     Lacy HARDY CMA ([facility])

## 2017-01-11 NOTE — TELEPHONE ENCOUNTER
Received fax from pharmacy stating patient requires Prior Authorization for Bydureon 2mg. vial    Insurance information:   Name: GENNARO Elam  Phone number: [phone]  ID number: [id number]    Initiate prior authorization or change medication?    If a prior authorization is to be initiated, please list the following:    -any medications the patient has tried and failed or any contraindications.  -is the patient currently on this medication, or has tried before?  -What is the diagnosis?  -Justification or other information that may be helpful.

## 2017-01-11 NOTE — TELEPHONE ENCOUNTER
fluconazole (DIFLUCAN) 150 MG tablet      Last Written Prescription Date: 1/8/2017  Last Fill Quantity: 1 tablet,  # refills: 1   Last Office Visit with [facility], [facility] or [facility] prescribing provider: 1/2/2017                                         Next 5 appointments (look out 90 days)     Feb 08, 2017  3:15 PM   PHYSICAL with Patrica Rhodes MD   [facility] ([facility])    [address] [place] [id number] [phone]

## 2017-01-11 NOTE — Clinical Note
[facility]  [address]  [place] [address]  [phone]          January 27, 2017    Maranda Mallory                                                                                                                    [address]   [facility] [address]            Dear Maranda,    We have tried to reach you by phone on several occasions, but were unable to do so.    We received a request from your pharmacy for a prior authorization for the Bydureon.  We need to know if Trulicity or Tanzeum are covered, or what would be covered.  If you are still interested in this medication, please contact your insurance and then call us back with what they tell you, and we can attempt to do a prior authorization to try to get the medication covered.    Please feel free to contact us with any questions or concerns.  Thank you.    Sincerely,         Zainab Aguilar MD/uche

## 2017-01-11 NOTE — TELEPHONE ENCOUNTER
This was just sent on 1/8/17    Called pharmacy and they stated that they have the prescription    No further action needed by the clinic    Nguyễn Howe RN

## 2017-01-12 NOTE — PROGRESS NOTES
Quick Note:    You have iron deficiency anemia. This is common in menstruating women but I would like for you to return a stool card to make sure you are not having any microscopic bleeding in the intestine. Normal thyroid. Normal liver blood test. Cholesterol is fine other than the triglycerides, which will come down when the blood sugars come down.    Start Vitron C 1 tab twice daily to treat the iron deficiency anemia. 3 month supply with 1 refill.     FIT card- indication iron deficiency anemia, screening for colon cancer  ______

## 2017-01-12 NOTE — TELEPHONE ENCOUNTER
LM for patient informing her to contact her insurance company and find out what is covered and call us back. Cristel Coleman,

## 2017-01-13 NOTE — TELEPHONE ENCOUNTER
Called patient and left message to call pink team back.     Lacy HARDY CMA ([facility])

## 2017-01-17 ENCOUNTER — TELEPHONE (OUTPATIENT)
Dept: EDUCATION SERVICES | Facility: CLINIC | Age: 47
End: 2017-01-17

## 2017-01-17 DIAGNOSIS — E78.5 HYPERLIPIDEMIA LDL GOAL <100: Primary | ICD-10-CM

## 2017-01-17 NOTE — TELEPHONE ENCOUNTER
rosuvastatin (CRESTOR) 10 MG tablet     Last Written Prescription Date: 11/15/2016  Last Fill Quantity: 30, # refills: 2  Last Office Visit with [facility], [facility] or [facility] prescribing provider: 01/02/2017   Next 5 appointments (look out 90 days)     Feb 08, 2017  3:15 PM   PHYSICAL with Patrica Rhodes MD   [facility]    [address] [place] [id number]   [phone]                   CHOL      192   1/10/2017  HDL       51   1/10/2017  LDL       78   1/10/2017  LDL      102   6/20/2015  TRIG      316   1/10/2017  CHOLHDLRATIO      4.2   1/10/2015      Araseli Alegria [facility]

## 2017-01-17 NOTE — TELEPHONE ENCOUNTER
Diabetes Follow-up    Subjective/Objective:  Called out to patient for blood glucose and insulin review.  Last date of communication was: 1/10/17.  Patient had an appointment scheduled on 1/13/17, however patient must have cancelled it and re-scheduled it for 2/8/17.    Left a voicemail for patient and will try again later.     Perla Armstrong RD, LD   Diabetes Education

## 2017-01-18 RX ORDER — ROSUVASTATIN CALCIUM 10 MG/1
10 TABLET, COATED ORAL DAILY
Qty: 30 TABLET | Refills: 10 | Status: SHIPPED | OUTPATIENT
Start: 2017-01-18 | End: 2017-01-20

## 2017-01-18 NOTE — TELEPHONE ENCOUNTER
Please see other telephone and my chart message to patient on same medication. Last attempt to reach patient made 11/16/17. Patient reviewed message on 01/17/17. Waiting response. Trina Centeno MA

## 2017-01-19 ENCOUNTER — TELEPHONE (OUTPATIENT)
Dept: FAMILY MEDICINE | Facility: CLINIC | Age: 47
End: 2017-01-19

## 2017-01-19 ENCOUNTER — TELEPHONE (OUTPATIENT)
Dept: EDUCATION SERVICES | Facility: CLINIC | Age: 47
End: 2017-01-19

## 2017-01-19 NOTE — TELEPHONE ENCOUNTER
Prescription approved per [facility] Refill Protocol.    Signed Prescriptions:                        Disp   Refills    rosuvastatin (CRESTOR) 10 MG tablet        30 tab*10       Sig: Take 1 tablet (10 mg) by mouth daily  Authorizing Provider: MIKI ABRAHAM  Ordering User: RACIEL MONTEZ, RN, BSN

## 2017-01-19 NOTE — TELEPHONE ENCOUNTER
Called out to patient to follow up on blood sugars and insulin.  Left a message and will try again later.  Also will send a MyChart message and request blood sugars.     Perla Armstrong RD, LD   Diabetes Education

## 2017-01-19 NOTE — TELEPHONE ENCOUNTER
Received fax from pharmacy stating patient requires Prior Authorization for Rosuvastatin 10mg    Insurance information:  Name:   Phone number: [phone]  ID number: [id number]    Provider to address. Message route to . Initiate prior authorization or change medication?  Please advise.    Valarie Mchugh MA    **If a prior authorization is to be initiated, please list the following:    -Any medications the patient has tried and failed or any contraindications. **    -Is the patient currently on this medication, or has tried before? **    -What is the diagnosis? **    - Justification or other information that me by helpful. **

## 2017-01-24 ENCOUNTER — TELEPHONE (OUTPATIENT)
Dept: EDUCATION SERVICES | Facility: CLINIC | Age: 47
End: 2017-01-24

## 2017-01-24 NOTE — TELEPHONE ENCOUNTER
See [facility] message on 01/19/2017.     Lacy HARDY CMA ([facility])

## 2017-01-24 NOTE — TELEPHONE ENCOUNTER
Called out to patient to follow up on how blood sugars have been.  Patient reports to be doing well.  Was on a short break at work and didn't have her numbers with her or much time to talk.  Reports she is finishing up her lantus before changing to Tresiba.  Reports she will be coming the the appointment on 2/8/17 and will follow up then.  Encouraged to call or MyChart if having concerns or questions before then.      Perla Armstrong RD, LD   Diabetes Education

## 2017-01-25 ENCOUNTER — APPOINTMENT (OUTPATIENT)
Dept: OPTOMETRY | Facility: CLINIC | Age: 47
End: 2017-01-25
Payer: COMMERCIAL

## 2017-01-25 ENCOUNTER — OFFICE VISIT (OUTPATIENT)
Dept: OPTOMETRY | Facility: CLINIC | Age: 47
End: 2017-01-25
Payer: COMMERCIAL

## 2017-01-25 DIAGNOSIS — H52.4 MYOPIA WITH PRESBYOPIA OF BOTH EYES: Primary | ICD-10-CM

## 2017-01-25 DIAGNOSIS — H52.13 MYOPIA WITH PRESBYOPIA OF BOTH EYES: Primary | ICD-10-CM

## 2017-01-25 DIAGNOSIS — E11.9 TYPE 2 DIABETES MELLITUS WITHOUT RETINOPATHY (H): ICD-10-CM

## 2017-01-25 PROCEDURE — 92015 DETERMINE REFRACTIVE STATE: CPT | Performed by: OPTOMETRIST

## 2017-01-25 PROCEDURE — 92014 COMPRE OPH EXAM EST PT 1/>: CPT | Performed by: OPTOMETRIST

## 2017-01-25 PROCEDURE — 92341 FIT SPECTACLES BIFOCAL: CPT | Mod: GA | Performed by: OPTOMETRIST

## 2017-01-25 ASSESSMENT — REFRACTION_MANIFEST
OS_AXIS: 145
OS_SPHERE: -1.00
OD_ADD: +2.00
OS_ADD: +2.00
OD_SPHERE: -0.50
METHOD_AUTOREFRACTION: 1
OS_SPHERE: -1.00
OS_CYLINDER: +0.25
OD_SPHERE: -0.50
OS_AXIS: 130
OS_CYLINDER: +0.25

## 2017-01-25 ASSESSMENT — VISUAL ACUITY
OD_CC: 20/20
OD_SC: 20/60
OD_CC: 20/20-1
OS_CC: 20/20
METHOD: SNELLEN - LINEAR
OS_CC: 20/30-1
OS_SC: 20/60
CORRECTION_TYPE: GLASSES
OS_SC: 20/80
OD_SC: 20/20

## 2017-01-25 ASSESSMENT — REFRACTION_WEARINGRX
OS_ADD: +2.00
OS_CYLINDER: +0.25
OD_ADD: +2.00
OS_AXIS: 125
OD_SPHERE: -1.00
OD_CYLINDER: +0.50
OD_AXIS: 020
SPECS_TYPE: BIFOCAL
OS_SPHERE: -1.25

## 2017-01-25 ASSESSMENT — TONOMETRY
OD_IOP_MMHG: 18
OS_IOP_MMHG: 18
IOP_METHOD: APPLANATION

## 2017-01-25 ASSESSMENT — EXTERNAL EXAM - RIGHT EYE: OD_EXAM: NORMAL

## 2017-01-25 ASSESSMENT — SLIT LAMP EXAM - LIDS
COMMENTS: NORMAL
COMMENTS: NORMAL

## 2017-01-25 ASSESSMENT — CONF VISUAL FIELD
METHOD: COUNTING FINGERS
OS_NORMAL: 1
OD_NORMAL: 1

## 2017-01-25 ASSESSMENT — CUP TO DISC RATIO
OD_RATIO: 0.5
OS_RATIO: 0.5

## 2017-01-25 ASSESSMENT — EXTERNAL EXAM - LEFT EYE: OS_EXAM: NORMAL

## 2017-01-25 NOTE — PROGRESS NOTES
Chief Complaint   Patient presents with     Eye Exam For Diabetes        A1C      9.2   1/10/2017  A1C      9.4   1/7/2017  A1C      9.0   9/17/2016  A1C      8.1   4/5/2016  A1C      7.5   12/30/2015    Last Eye Exam: 12/14/2015  Dilated Previously: Yes    Pt is having problems with her vision.  Pt recently was diagnosed with MS and has questions of how that will affect her vision.     What are you currently using to see?  Glasses - Glasses are broken and she did not use very often because the glasses gave her headaches.    Distance Vision Acuity: Noticed gradual change in both eyes    Near Vision Acuity: Not satisfied     Eye Comfort: good  Do you use eye drops? 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       OBJECTIVE: See Ophthalmology exam    ASSESSMENT:    ICD-10-CM    1. Myopia with presbyopia of both eyes H52.13 EYE EXAM (SIMPLE-NONBILLABLE)    H52.4 REFRACTION   2. Type 2 diabetes mellitus without retinopathy (H) E11.9 EYE EXAM (SIMPLE-NONBILLABLE)      PLAN:    Maranda Mallory aware  eye exam results will be sent to Zainab Aguilar.  Patient Instructions   There was no diabetic eye disease in either eye today. Keep blood sugar levels under good control and return yearly for eye exams.    Try to find out the reason that your dad is taking the eye drops.    If your eyes are itchy in the summer try Zaditor drops twice a day.     Thank you!

## 2017-01-25 NOTE — PATIENT INSTRUCTIONS
There was no diabetic eye disease in either eye today. Keep blood sugar levels under good control and return yearly for eye exams.    Try to find out the reason that your dad is taking the eye drops.    If your eyes are itchy in the summer try Zaditor drops twice a day.     Thank you!

## 2017-01-26 NOTE — TELEPHONE ENCOUNTER
Left message on both phone numbers for patient to call back in regards to message from PCP below.    Lisa Kee, CMA

## 2017-01-30 ENCOUNTER — TELEPHONE (OUTPATIENT)
Dept: FAMILY MEDICINE | Facility: CLINIC | Age: 47
End: 2017-01-30

## 2017-01-30 DIAGNOSIS — E11.9 TYPE 2 DIABETES MELLITUS WITHOUT COMPLICATION (H): Primary | ICD-10-CM

## 2017-01-30 NOTE — TELEPHONE ENCOUNTER
Drug Change Request for Accu-chek Kitty Plus Strips 100's    Per [facility] pharmacy: Plan does not cover medication prescribed. Per RX benefit plan alternative medications include; ONE TOUCH. Please fax back with approval along with strength, directions, quantity, and refills.       Lisa Kee, CMA

## 2017-02-08 ENCOUNTER — TRANSFERRED RECORDS (OUTPATIENT)
Dept: HEALTH INFORMATION MANAGEMENT | Facility: CLINIC | Age: 47
End: 2017-02-08

## 2017-02-15 ENCOUNTER — TELEPHONE (OUTPATIENT)
Dept: EDUCATION SERVICES | Facility: CLINIC | Age: 47
End: 2017-02-15

## 2017-02-15 NOTE — TELEPHONE ENCOUNTER
Diabetes Follow-up    Called out to patient to follow up on recent blood sugars and help schedule an in office visit.  Voicemail left on phone with call back number.     Perla Armstrong RD, LD   Diabetes Education

## 2017-02-23 ENCOUNTER — TELEPHONE (OUTPATIENT)
Dept: FAMILY MEDICINE | Facility: CLINIC | Age: 47
End: 2017-02-23

## 2017-02-23 NOTE — TELEPHONE ENCOUNTER
She needs to call her insurance or go online and see what else is covered in this family.  Usually omeprazole is covered so this is odd and will require her assistance.

## 2017-02-23 NOTE — TELEPHONE ENCOUNTER
Received fax from pharmacy stating patient requires Prior Authorization for Omeprazole 40 mg capsules.     Insurance information:   Name: [employer] [place]  Phone number: [phone]  ID number: [id number]    Initiate prior authorization or change medication?    If a prior authorization is to be initiated, please list the following:    -any medications the patient has tried and failed or any contraindications.  -is the patient currently on this medication, or has tried before?  -What is the diagnosis?  -Justification or other information that may be helpful.

## 2017-02-24 NOTE — TELEPHONE ENCOUNTER
Called and left message on home phone, will try cell phone later today.     Lacy HARDY CMA ([facility])

## 2017-02-28 ENCOUNTER — TRANSFERRED RECORDS (OUTPATIENT)
Dept: HEALTH INFORMATION MANAGEMENT | Facility: CLINIC | Age: 47
End: 2017-02-28

## 2017-03-01 ENCOUNTER — TELEPHONE (OUTPATIENT)
Dept: EDUCATION SERVICES | Facility: CLINIC | Age: 47
End: 2017-03-01

## 2017-03-01 DIAGNOSIS — E11.9 TYPE 2 DIABETES MELLITUS WITHOUT COMPLICATION (H): ICD-10-CM

## 2017-03-01 NOTE — TELEPHONE ENCOUNTER
Called patient 3:10 PM and left a message about changing the Tresiba.  Patient said leaving a detailed message on cell was OK.  Writer and patient had discussed this on the phone previously and patient was understanding of this. Patient is aware to not overlap doses and will not give insulin tonight and start tomorrow.  Tresiba is long acting and therefor dose held tonight should not cause much change.       Perla Armstrong RD, LD   Diabetes Education

## 2017-03-01 NOTE — TELEPHONE ENCOUNTER
Hi Dr. Aguilar,     I was able to get a hold of Maranda and she made an appointment to follow up with myself in diabetes education the same day she will be seeing you (3/24).   I noticed she is on Tresiba 18 units BID - could that be changed to 36units once daily as that is how Tresiba is normally dosed?   Please note if you approve of this plan.  I can call Maranda back and update.      Maranda is taking Novolog only 1 time a day and usually skipping it with lunch and dinner.  She is concerned about weight gain, but also about the higher numbers and is very stressed about both.  I am hoping to review diet changes/ideas with her. Reports fasting is around 158mg/dL, which seems to be an improvement.      She did not get the Bydureon covered.  She said she would be fine with taking Victoza again if that was covered. I was trying to look back and see why that was discontinued in the hospital or if that was a formulary issue, but I could not find the right notes.  Do you remember why?     Thanks,   Ni Armstrong RD, LD   Diabetes Education

## 2017-03-01 NOTE — TELEPHONE ENCOUNTER
Ok for Changing tresiba to once daily at 36 units.    I don't remember why victoza was stopped.  She can definitely restart it.

## 2017-03-01 NOTE — TELEPHONE ENCOUNTER
Patient will contact Insurance company regarding Medications and call us back with any information.    Monique Gonzalez MA

## 2017-03-08 NOTE — TELEPHONE ENCOUNTER
Patient called back, she talked to insurance and they said if we send in PA it will get approved for a monthly refill. Please start PA.     Lacy HARDY CMA ([facility])

## 2017-03-12 NOTE — TELEPHONE ENCOUNTER
Indication - GERD  On prevacid (unclear results), Zantac (ineffective) in the past.    Currently on med and doing well.

## 2017-03-13 NOTE — TELEPHONE ENCOUNTER
PA done over the phone with [facility].  This was approved effective 3-13-17 to 3-13-18.  Case ID is [id number].  [facility] pharmacy ([phone]) called and informed.  They will contact patient. Cristel Coleman,

## 2017-03-15 ENCOUNTER — THERAPY VISIT (OUTPATIENT)
Dept: SLEEP MEDICINE | Facility: CLINIC | Age: 47
End: 2017-03-15
Attending: INTERNAL MEDICINE
Payer: COMMERCIAL

## 2017-03-15 DIAGNOSIS — R06.83 SNORING: ICD-10-CM

## 2017-03-15 DIAGNOSIS — E11.9 TYPE 2 DIABETES MELLITUS WITHOUT COMPLICATION, WITH LONG-TERM CURRENT USE OF INSULIN (H): ICD-10-CM

## 2017-03-15 DIAGNOSIS — R56.9 CONVULSIONS, UNSPECIFIED CONVULSION TYPE (H): ICD-10-CM

## 2017-03-15 DIAGNOSIS — G47.19 EXCESSIVE DAYTIME SLEEPINESS: ICD-10-CM

## 2017-03-15 DIAGNOSIS — G35 MS (MULTIPLE SCLEROSIS) (H): ICD-10-CM

## 2017-03-15 DIAGNOSIS — G47.00 PERSISTENT DISORDER OF INITIATING OR MAINTAINING SLEEP: ICD-10-CM

## 2017-03-15 DIAGNOSIS — Z79.4 TYPE 2 DIABETES MELLITUS WITHOUT COMPLICATION, WITH LONG-TERM CURRENT USE OF INSULIN (H): ICD-10-CM

## 2017-03-15 PROCEDURE — 95810 POLYSOM 6/> YRS 4/> PARAM: CPT | Mod: ZF | Performed by: INTERNAL MEDICINE

## 2017-03-15 NOTE — MR AVS SNAPSHOT
After Visit Summary   3/15/2017    Maranda Mallory    MRN: [id number]           Patient Information     Date Of Birth          1970        Visit Information        Provider Department      3/15/2017 8:00 PM SLEEP STUDY RM 1 [facility], [place], Sleep Study        Today's Diagnoses     Excessive daytime sleepiness        Persistent disorder of initiating or maintaining sleep        Snoring        Convulsions, unspecified convulsion type (H)        MS (multiple sclerosis) (H)          Care Instructions    [facility]    1. Your sleep study will be reviewed by a sleep physician within the next few days.     2. Please follow up in the sleep clinic as scheduled, or, make an appointment with your sleep provider to be seen within two weeks to discuss the results of the sleep study.    3. If you have any questions or problems with your treatment plan, please contact your sleep clinic provider at [phone] to further manage your condition.    4. Please review your attached medication list, and, at your follow-up appointment advise your sleep clinic provider about any changes.    5. Go to [email or web address] for more information about your sleep problems.    RAJI Julio  March 15, 2017                Follow-ups after your visit        Your next 10 appointments already scheduled     Mar 24, 2017 12:30 PM CDT   Diabetic Education with [facility]   [facility] ([facility])    [address] [place] [id number] [phone]            Mar 24, 2017  2:00 PM CDT   Office Visit with Zainab Aguilar MD   [facility] ([address]  [place] [id number] [phone]           Bring a current list of meds and any records pertaining to this visit.  For Physicals, please bring immunization records and any forms needing to be filled out.  Please arrive 10  minutes early to complete paperwork.            Apr 18, 2017 10:00 AM CDT   PHYSICAL with Patrica Rhodes MD   [facility] ([facility])    [address] [place] [address] 700  [facility] [id number] [phone]            Jun 08, 2017  1:00 PM CDT   (Arrive by 12:45 PM)   MR BRAIN W/O & W CONTRAST with [address] MRI ([facility])    [address]  1st Floor  [facility] [id number] [phone]           Take your medicines as usual, unless your doctor tells you not to. Bring a list of your current medicines to your exam (including vitamins, minerals and over-the-counter drugs).  You will be given intravenous contrast for this exam. To prepare:   The day before your exam, drink extra fluids at least six 8-ounce glasses (unless your doctor tells you to restrict your fluids).   Have a blood test (creatinine test) within 30 days of your exam. Go to your clinic or Diagnostic Imaging Department for this test.  The MRI machine uses a strong magnet. Please wear clothes without metal (snaps, zippers). A sweatsuit works well, or we may give you a hospital gown.  Please remove any body piercings and hair extensions before you arrive. You will also remove watches, jewelry, hairpins, wallets, dentures, partial dental plates and hearing aids. You may wear contact lenses, and you may be able to wear your rings. We have a safe place to keep your personal items, but it is safer to leave them at home.   **IMPORTANT** THE INSTRUCTIONS BELOW ARE ONLY FOR THOSE PATIENTS WHO HAVE BEEN TOLD THEY WILL RECEIVE SEDATION OR GENERAL ANESTHESIA DURING THEIR MRI PROCEDURE:  IF YOU WILL RECEIVE SEDATION (take medicine to help you relax during your exam):   You must get the medicine from your doctor before you arrive. Bring the medicine to the exam. Do not take it at home.   Arrive one hour early. Bring someone who can take you home after the test. Your  medicine will make you sleepy. After the exam, you may not drive, take a bus or take a taxi by yourself.   No eating 8 hours before your exam. You may have clear liquids up until 4 hours before your exam. (Clear liquids include water, clear tea, black coffee and fruit juice without pulp.)  IF YOU WILL RECEIVE ANESTHESIA (be asleep for your exam):   Arrive 1 1/2 hours early. Bring someone who can take you home after the test. You may not drive, take a bus or take a taxi by yourself.   No eating 8 hours before your exam. You may have clear liquids up until 4 hours before your exam. (Clear liquids include water, clear tea, black coffee and fruit juice without pulp.)  Please call the Imaging Department at your exam site with any questions.            Jun 08, 2017  1:45 PM CDT   (Arrive by 1:30 PM)   MR CERVICAL SPINE W/O & W CONTRAST with [address] MRI ([facility] [place])    [address] [address] [phone]           Take your medicines as usual, unless your doctor tells you not to. Bring a list of your current medicines to your exam (including vitamins, minerals and over-the-counter drugs).  You will be given intravenous contrast for this exam. To prepare:   The day before your exam, drink extra fluids at least six 8-ounce glasses (unless your doctor tells you to restrict your fluids).   Have a blood test (creatinine test) within 30 days of your exam. Go to your clinic or Diagnostic Imaging Department for this test.  The MRI machine uses a strong magnet. Please wear clothes without metal (snaps, zippers). A sweatsuit works well, or we may give you a hospital gown.  Please remove any body piercings and hair extensions before you arrive. You will also remove watches, jewelry, hairpins, wallets, dentures, partial dental plates and hearing aids. You may wear contact lenses, and you may be able to wear your rings. We have a safe place to keep  your personal items, but it is safer to leave them at home.   **IMPORTANT** THE INSTRUCTIONS BELOW ARE ONLY FOR THOSE PATIENTS WHO HAVE BEEN TOLD THEY WILL RECEIVE SEDATION OR GENERAL ANESTHESIA DURING THEIR MRI PROCEDURE:  IF YOU WILL RECEIVE SEDATION (take medicine to help you relax during your exam):   You must get the medicine from your doctor before you arrive. Bring the medicine to the exam. Do not take it at home.   Arrive one hour early. Bring someone who can take you home after the test. Your medicine will make you sleepy. After the exam, you may not drive, take a bus or take a taxi by yourself.   No eating 8 hours before your exam. You may have clear liquids up until 4 hours before your exam. (Clear liquids include water, clear tea, black coffee and fruit juice without pulp.)  IF YOU WILL RECEIVE ANESTHESIA (be asleep for your exam):   Arrive 1 1/2 hours early. Bring someone who can take you home after the test. You may not drive, take a bus or take a taxi by yourself.   No eating 8 hours before your exam. You may have clear liquids up until 4 hours before your exam. (Clear liquids include water, clear tea, black coffee and fruit juice without pulp.)  Please call the Imaging Department at your exam site with any questions.            Jun 08, 2017  2:30 PM CDT   (Arrive by 2:15 PM)   Return Multiple Sclerosis with Carlos Hunt MD   [facility] Multiple Sclerosis ([facility])    [address] [place] [address] [phone]              Who to contact     If you have questions or need follow up information about today's clinic visit or your schedule please [facility] directly at [phone].  Normal or non-critical lab and imaging results will be communicated to you by MyChart, letter or phone within 4 business days after the clinic has received the results. If you do not hear from us within 7 days, please contact the clinic  through [employer] or phone. If you have a critical or abnormal lab result, we will notify you by phone as soon as possible.  Submit refill requests through [employer] or call your pharmacy and they will forward the refill request to us. Please allow 3 business days for your refill to be completed.          Additional Information About Your Visit        [employer] Information     [employer] gives you secure access to your electronic health record. If you see a primary care provider, you can also send messages to your care team and make appointments. If you have questions, please call your primary care clinic.  If you do not have a primary care provider, please call [phone] and they will assist you.        Care EveryWhere ID     This is your Care EveryWhere ID. This could be used by other organizations to access your [facility] medical records  [id number]         Blood Pressure from Last 3 Encounters:   01/02/17 134/80   12/08/16 129/74   12/08/16 122/78    Weight from Last 3 Encounters:   01/02/17 108.4 kg (239 lb)   12/08/16 107.5 kg (237 lb)   12/08/16 107.5 kg (237 lb)              We Performed the Following     Comprehensive Sleep Study        Primary Care Provider Office Phone # Fax #    Zainab Aguilar [phone]       [address] [address]        Thank you!     Thank you for choosing [facility], SLEEP STUDY  for your care. Our goal is always to provide you with excellent care. Hearing back from our patients is one way we can continue to improve our services. Please take a few minutes to complete the written survey that you may receive in the mail after your visit with us. Thank you!             Your Updated Medication List - Protect others around you: Learn how to safely use, store and throw away your medicines at www.disposemymeds.org.          This list is accurate as of: 3/15/17 10:39 PM.  Always use your most recent med list.                    Brand Name Dispense Instructions for use    ACE NOT PRESCRIBED (INTENTIONAL)     0 each    ACE Inhibitor not prescribed due to Other:no microalbuminuria       aspirin 81 MG tablet     30 tablet    Take by mouth daily       atorvastatin 40 MG tablet    LIPITOR    90 tablet    Take 1 tablet (40 mg) by mouth daily       AUBAGIO 14 MG tablet   Generic drug:  teriflunomide      Take by mouth daily Only available through select specialty pharmacies       blood glucose calibration solution     1 each    Use to calibrate blood glucose monitor as directed.       * blood glucose monitoring lancets     60 each    Use to test blood sugars 2 times daily or as directed.       * blood glucose monitoring lancets     3 Box    Use to test blood sugars 3 times daily or as directed.       blood glucose monitoring meter device kit    no brand specified    1 kit    Use to test blood sugar 1 times daily or as directed       * blood glucose monitoring test strip    FREESTYLE LITE    1 Month    Use to test blood sugars 2 times daily or as directed.       * blood glucose monitoring test strip    ONE TOUCH ULTRA    300 strip    Use to test blood sugars 3 times daily or as directed.       diltiazem 120 MG 24 hr capsule    CARDIZEM CD    30 capsule    Take 1 capsule (120 mg) by mouth daily       exenatide ER 2 MG recon vial kit susp for weekly inj    BYDUREON    1 kit    Inject 2 mg Subcutaneous every 7 days       ferrous fumarate 65 mg ([place]. FE)-Vitamin C 125 mg  MG Tabs tablet    VITRON C    180 tablet    Take 1 tablet by mouth 2 times daily       fluconazole 150 MG tablet    DIFLUCAN    1 tablet    TAKE 1 TABLET BY MOUTH ONCE FOR 1 DOSE- REPEAT IN 3 DAYS IF SYMPTOMS PERSIST       insulin aspart 100 UNIT/ML injection    NovoLOG FLEXPEN    3 Month    Inject 15 Units Subcutaneous 3 times daily (with meals)       insulin degludec 100 UNIT/ML pen    TRESIBA    36 mL    Inject 36 units once daily       * insulin pen needle 32G X 4 MM    BD  SAMANTHA U/F    100 each    Use once daily or as directed.       * insulin pen needle 29G X 12.7MM    BD ULTRA-FINE    100 each    Use once daily with victoza pen       * insulin pen needle 31G X 6 MM     300 each    Use 4 daily or as directed.       metFORMIN 500 MG 24 hr tablet    GLUCOPHAGE-XR    120 tablet    Take 2 tablets (1,000 mg) by mouth 2 times daily (with meals)       * omeprazole 40 MG capsule    priLOSEC    180 capsule    TAKE ONE CAPSULE BY MOUTH TWICE DAILY       * omeprazole 20 MG CR capsule    priLOSEC    90 capsule    Take 1 capsule (20 mg) by mouth daily       SENNA-PLUS PO      Take by mouth 2 times daily 2 tabs       TEGRETOL PO      Take 50 mg by mouth       Urea 20 % Crea     120 g    To dry and scaly feet twice a day as needed.       venlafaxine 75 MG 24 hr capsule    EFFEXOR-XR    90 capsule    Take 1 capsule (75 mg) by mouth daily       vitamin D 2000 UNITS tablet     180 tablet    Take 4,000 Units by mouth daily       * Notice:  This list has 9 medication(s) that are the same as other medications prescribed for you. Read the directions carefully, and ask your doctor or other care provider to review them with you.

## 2017-03-15 NOTE — TELEPHONE ENCOUNTER
Pending Prescriptions:                       Disp   Refills    metFORMIN (GLUCOPHAGE) 500 MG tablet [Pha*120 ta*0            Sig: TAKE 2 TABLETS BY MOUTH TWICE DAILY WITH MEALS             Last Written Prescription Date: 10.11.16  Last Fill Quantity: 120, # refills: 3  Last Office Visit with [facility], [facility] or [facility] prescribing provider:  1.2.17   Next 5 appointments (look out 90 days)     Mar 24, 2017  2:00 PM CDT   Office Visit with Zainab Aguilar MD   [facility] ([address] [address]   [phone]            Apr 18, 2017 10:00 AM CDT   PHYSICAL with Patrica Rhodes MD   [facility]    [address] [place] [id number] [phone]                   BP Readings from Last 3 Encounters:   01/02/17 134/80   12/08/16 129/74   12/08/16 122/78     Lab Results   Component Value Date    MICROL 71 09/28/2016     No results found for: MICROALBUMIN  Creatinine   Date Value Ref Range Status   11/27/2016 0.51 (L) 0.52 - 1.04 mg/dL Final   04/13/2005 0.40 (L) 0.60 - 1.30 mg/dL Final   ]  GFR Estimate   Date Value Ref Range Status   11/27/2016 >90  Non  GFR Calc   >60 mL/min/1.7m2 Final   11/14/2016 77 >60 mL/min/1.7m2 Final     Comment:     Non  GFR Calc   09/17/2016 >90  Non  GFR Calc   >60 mL/min/1.7m2 Final     GFR Estimate If Black   Date Value Ref Range Status   11/27/2016 >90   GFR Calc   >60 mL/min/1.7m2 Final   11/14/2016 >90   GFR Calc   >60 mL/min/1.7m2 Final   09/17/2016 >90   GFR Calc   >60 mL/min/1.7m2 Final     Lab Results   Component Value Date    CHOL 192 01/10/2017     Lab Results   Component Value Date    HDL 51 01/10/2017     Lab Results   Component Value Date    LDL 78 01/10/2017     06/20/2015     Lab Results   Component Value Date    TRIG 316 01/10/2017     Lab  Results   Component Value Date    CHOLHDLRATIO 4.2 01/10/2015     Lab Results   Component Value Date    AST 37 01/10/2017     Lab Results   Component Value Date    ALT 38 01/10/2017     Lab Results   Component Value Date    A1C 9.2 01/10/2017    A1C 9.4 01/07/2017    A1C 9.0 09/17/2016    A1C 8.1 04/05/2016    A1C 7.5 12/30/2015     Potassium   Date Value Ref Range Status   11/27/2016 3.8 3.4 - 5.3 mmol/L Final

## 2017-03-16 NOTE — TELEPHONE ENCOUNTER
Routing refill request to provider for review/approval because:  Labs out of range:  Cr,  A1C    Virginia Roque RN - BC

## 2017-03-16 NOTE — PATIENT INSTRUCTIONS
[place] [facility]    1. Your sleep study will be reviewed by a sleep physician within the next few days.     2. Please follow up in the sleep clinic as scheduled, or, make an appointment with your sleep provider to be seen within two weeks to discuss the results of the sleep study.    3. If you have any questions or problems with your treatment plan, please contact your sleep clinic provider at [phone] to further manage your condition.    4. Please review your attached medication list, and, at your follow-up appointment advise your sleep clinic provider about any changes.    5. Go to [email or web address] for more information about your sleep problems.    Nikia Valentine RPSGT  March 15, 2017

## 2017-03-16 NOTE — NURSING NOTE
Diagnostic full seizure montage PSG completed per provider order.  Patient did not meet criteria for PAP therapy.

## 2017-03-20 ENCOUNTER — TELEPHONE (OUTPATIENT)
Dept: SLEEP MEDICINE | Facility: CLINIC | Age: 47
End: 2017-03-20

## 2017-03-24 ENCOUNTER — TRANSFERRED RECORDS (OUTPATIENT)
Dept: HEALTH INFORMATION MANAGEMENT | Facility: CLINIC | Age: 47
End: 2017-03-24

## 2017-03-24 ENCOUNTER — OFFICE VISIT (OUTPATIENT)
Dept: FAMILY MEDICINE | Facility: CLINIC | Age: 47
End: 2017-03-24
Payer: COMMERCIAL

## 2017-03-24 VITALS
HEART RATE: 111 BPM | BODY MASS INDEX: 36.89 KG/M2 | TEMPERATURE: 97.3 F | DIASTOLIC BLOOD PRESSURE: 78 MMHG | WEIGHT: 243.4 LBS | OXYGEN SATURATION: 97 % | SYSTOLIC BLOOD PRESSURE: 122 MMHG | HEIGHT: 68 IN

## 2017-03-24 DIAGNOSIS — E11.00 TYPE 2 DIABETES MELLITUS WITH HYPEROSMOLARITY WITHOUT COMA, WITH LONG-TERM CURRENT USE OF INSULIN (H): Primary | ICD-10-CM

## 2017-03-24 DIAGNOSIS — K44.9 SLIDING HIATAL HERNIA: ICD-10-CM

## 2017-03-24 DIAGNOSIS — I47.10 PAROXYSMAL SUPRAVENTRICULAR TACHYCARDIA (H): ICD-10-CM

## 2017-03-24 DIAGNOSIS — D50.9 ANEMIA, IRON DEFICIENCY: ICD-10-CM

## 2017-03-24 DIAGNOSIS — G47.19 EXCESSIVE DAYTIME SLEEPINESS: ICD-10-CM

## 2017-03-24 DIAGNOSIS — Z79.4 TYPE 2 DIABETES MELLITUS WITH HYPEROSMOLARITY WITHOUT COMA, WITH LONG-TERM CURRENT USE OF INSULIN (H): Primary | ICD-10-CM

## 2017-03-24 DIAGNOSIS — G35 MS (MULTIPLE SCLEROSIS) (H): ICD-10-CM

## 2017-03-24 DIAGNOSIS — F41.9 ANXIETY: ICD-10-CM

## 2017-03-24 DIAGNOSIS — D50.8 OTHER IRON DEFICIENCY ANEMIA: ICD-10-CM

## 2017-03-24 DIAGNOSIS — E87.6 HYPOKALEMIA: ICD-10-CM

## 2017-03-24 LAB
ANION GAP SERPL CALCULATED.3IONS-SCNC: 13 MMOL/L (ref 3–14)
BASOPHILS # BLD AUTO: 0.1 10E9/L (ref 0–0.2)
BASOPHILS NFR BLD AUTO: 0.7 %
BUN SERPL-MCNC: 8 MG/DL (ref 7–30)
CALCIUM SERPL-MCNC: 9.2 MG/DL (ref 8.5–10.1)
CHLORIDE SERPL-SCNC: 101 MMOL/L (ref 94–109)
CO2 SERPL-SCNC: 23 MMOL/L (ref 20–32)
CREAT SERPL-MCNC: 0.57 MG/DL (ref 0.52–1.04)
DIFFERENTIAL METHOD BLD: ABNORMAL
EOSINOPHIL # BLD AUTO: 0.2 10E9/L (ref 0–0.7)
EOSINOPHIL NFR BLD AUTO: 2.6 %
ERYTHROCYTE [DISTWIDTH] IN BLOOD BY AUTOMATED COUNT: 18.6 % (ref 10–15)
FERRITIN SERPL-MCNC: 33 NG/ML (ref 12–150)
GFR SERPL CREATININE-BSD FRML MDRD: ABNORMAL ML/MIN/1.7M2
GLUCOSE SERPL-MCNC: 311 MG/DL (ref 70–99)
HBA1C MFR BLD: 9 % (ref 4.3–6)
HCT VFR BLD AUTO: 41.7 % (ref 35–47)
HGB BLD-MCNC: 13.8 G/DL (ref 11.7–15.7)
LYMPHOCYTES # BLD AUTO: 3.1 10E9/L (ref 0.8–5.3)
LYMPHOCYTES NFR BLD AUTO: 34.5 %
MCH RBC QN AUTO: 27.5 PG (ref 26.5–33)
MCHC RBC AUTO-ENTMCNC: 33.1 G/DL (ref 31.5–36.5)
MCV RBC AUTO: 83 FL (ref 78–100)
MONOCYTES # BLD AUTO: 0.7 10E9/L (ref 0–1.3)
MONOCYTES NFR BLD AUTO: 8.1 %
NEUTROPHILS # BLD AUTO: 4.9 10E9/L (ref 1.6–8.3)
NEUTROPHILS NFR BLD AUTO: 54.1 %
PLATELET # BLD AUTO: 328 10E9/L (ref 150–450)
POTASSIUM SERPL-SCNC: 3.9 MMOL/L (ref 3.4–5.3)
RBC # BLD AUTO: 5.02 10E12/L (ref 3.8–5.2)
SODIUM SERPL-SCNC: 137 MMOL/L (ref 133–144)
WBC # BLD AUTO: 9.1 10E9/L (ref 4–11)

## 2017-03-24 PROCEDURE — 85025 COMPLETE CBC W/AUTO DIFF WBC: CPT | Performed by: INTERNAL MEDICINE

## 2017-03-24 PROCEDURE — 83036 HEMOGLOBIN GLYCOSYLATED A1C: CPT | Performed by: INTERNAL MEDICINE

## 2017-03-24 PROCEDURE — 99214 OFFICE O/P EST MOD 30 MIN: CPT | Performed by: INTERNAL MEDICINE

## 2017-03-24 PROCEDURE — 36415 COLL VENOUS BLD VENIPUNCTURE: CPT | Performed by: INTERNAL MEDICINE

## 2017-03-24 PROCEDURE — 82728 ASSAY OF FERRITIN: CPT | Performed by: INTERNAL MEDICINE

## 2017-03-24 PROCEDURE — 80048 BASIC METABOLIC PNL TOTAL CA: CPT | Performed by: INTERNAL MEDICINE

## 2017-03-24 RX ORDER — VENLAFAXINE HYDROCHLORIDE 150 MG/1
150 CAPSULE, EXTENDED RELEASE ORAL DAILY
Qty: 90 CAPSULE | Refills: 1 | Status: SHIPPED | OUTPATIENT
Start: 2017-03-24 | End: 2017-10-12

## 2017-03-24 NOTE — MR AVS SNAPSHOT
After Visit Summary   3/24/2017    Maranda Mallory    MRN: [id number]           Patient Information     Date Of Birth          1970        Visit Information        Provider Department      3/24/2017 2:00 PM Zainab Aguilar MD [facility]        Today's Diagnoses     Type 2 diabetes mellitus with hyperosmolarity without coma, with long-term current use of insulin (H)    -  1    Sliding hiatal hernia        Anxiety        Hypokalemia        MS (multiple sclerosis) (H)        Excessive daytime sleepiness        Other iron deficiency anemia        BMI 37.0-37.9, adult          Care Instructions    - Start taking Victoza, 0.6 MG daily for 1 week, then 1.2 MG daily for a week, and then 1.8 MG daily.     - We can consider adding on Topamax in the future, but this tends to make people tired..     - You do qualify for bariatric surgery. There are surgeons that can do both bariatric surgery and hiatal hernia repair (Dr Garibay and partners)    - Start taking 150 MG Effexor once daily in the morning.     - Try sugar free candies at work.     - Return FIT card.     [facility]    If you have any questions regarding to your visit please contact your care team:     Team Pink:   Clinic Hours Telephone Number   Internal Medicine:  Dr. Zainab Geller NP       7am-7pm  Monday - Thursday   7am-5pm  Fridays  [phone]  (Appointment scheduling available 24/7)    Questions about your visit?  Team Line  [phone]   Urgent Care - [place] - 11am-9pm Monday-Friday Saturday-Sunday- 9am-5pm   [place] - 5pm-9pm Monday-Friday Saturday-Sunday- 9am-5pm  [phone] - [place]  [phone] - [place]       What options do I have for visits at the clinic other than the traditional office visit?  To expand how we care for you, many of our providers are utilizing electronic visits (e-visits) and telephone visits, when  medically appropriate, for interactions with their patients rather than a visit in the clinic.   We also offer nurse visits for many medical concerns. Just like any other service, we will bill your insurance company for this type of visit based on time spent on the phone with your provider. Not all insurance companies cover these visits. Please check with your medical insurance if this type of visit is covered. You will be responsible for any charges that are not paid by your insurance.      E-visits via GooseChasehart:  generally incur a $35.00 fee.  Telephone visits:  Time spent on the phone: *charged based on time that is spent on the phone in increments of 10 minutes. Estimated cost:   5-10 mins $30.00   11-20 mins. $59.00   21-30 mins. $85.00   Use [email or web address] (secure email communication and access to your chart) to send your primary care provider a message or make an appointment. Ask someone on your Team how to sign up for [email or web address].    For a Price Quote for your services, please call our [employer]Mine[employer] Line at [phone].    As always, Thank you for trusting us with your health care needs!      Araseli Alegria CMA          Follow-ups after your visit        Additional Services     BARIATRIC ADULT REFERRAL       Your provider has referred you to: [facility]: [facility] [phone]   [email or web address]    Please be aware that coverage of these services is subject to the terms and limitations of your health insurance plan.  Call member services at your health plan with any benefit or coverage questions.      Please bring the following with you to your appointment:      (1) List of current medications   (2) This referral request   (3) Any documents/labs given to you for this referral                  Your next 10 appointments already scheduled     Apr 18, 2017 10:00 AM CDT   PHYSICAL with Ptarica Rhodes MD   [facility]  ([facility])    [address] [place] [address]  [address]  [facility] [address]   [phone]            Jun 08, 2017  1:00 PM CDT   (Arrive by 12:45 PM)   MR BRAIN W/O & W CONTRAST with [address] [facility] MRI ([facility])    [address]  1st Floor  [facility] [address]   [phone]           Take your medicines as usual, unless your doctor tells you not to. Bring a list of your current medicines to your exam (including vitamins, minerals and over-the-counter drugs).  You will be given intravenous contrast for this exam. To prepare:   The day before your exam, drink extra fluids at least six 8-ounce glasses (unless your doctor tells you to restrict your fluids).   Have a blood test (creatinine test) within 30 days of your exam. Go to your clinic or Diagnostic Imaging Department for this test.  The MRI machine uses a strong magnet. Please wear clothes without metal (snaps, zippers). A sweatsuit works well, or we may give you a hospital gown.  Please remove any body piercings and hair extensions before you arrive. You will also remove watches, jewelry, hairpins, wallets, dentures, partial dental plates and hearing aids. You may wear contact lenses, and you may be able to wear your rings. We have a safe place to keep your personal items, but it is safer to leave them at home.   **IMPORTANT** THE INSTRUCTIONS BELOW ARE ONLY FOR THOSE PATIENTS WHO HAVE BEEN TOLD THEY WILL RECEIVE SEDATION OR GENERAL ANESTHESIA DURING THEIR MRI PROCEDURE:  IF YOU WILL RECEIVE SEDATION (take medicine to help you relax during your exam):   You must get the medicine from your doctor before you arrive. Bring the medicine to the exam. Do not take it at home.   Arrive one hour early. Bring someone who can take you home after the test. Your medicine will make you sleepy. After the exam, you may not drive, take a bus or take a taxi by yourself.   No eating 8 hours before your  exam. You may have clear liquids up until 4 hours before your exam. (Clear liquids include water, clear tea, black coffee and fruit juice without pulp.)  IF YOU WILL RECEIVE ANESTHESIA (be asleep for your exam):   Arrive 1 1/2 hours early. Bring someone who can take you home after the test. You may not drive, take a bus or take a taxi by yourself.   No eating 8 hours before your exam. You may have clear liquids up until 4 hours before your exam. (Clear liquids include water, clear tea, black coffee and fruit juice without pulp.)  Please call the Imaging Department at your exam site with any questions.            Jun 08, 2017  1:45 PM CDT   (Arrive by 1:30 PM)   MR CERVICAL SPINE W/O & W CONTRAST with [address] MRI ([facility] [employer])    [address] [address] [phone]           Take your medicines as usual, unless your doctor tells you not to. Bring a list of your current medicines to your exam (including vitamins, minerals and over-the-counter drugs).  You will be given intravenous contrast for this exam. To prepare:   The day before your exam, drink extra fluids at least six 8-ounce glasses (unless your doctor tells you to restrict your fluids).   Have a blood test (creatinine test) within 30 days of your exam. Go to your clinic or Diagnostic Imaging Department for this test.  The MRI machine uses a strong magnet. Please wear clothes without metal (snaps, zippers). A sweatsuit works well, or we may give you a hospital gown.  Please remove any body piercings and hair extensions before you arrive. You will also remove watches, jewelry, hairpins, wallets, dentures, partial dental plates and hearing aids. You may wear contact lenses, and you may be able to wear your rings. We have a safe place to keep your personal items, but it is safer to leave them at home.   **IMPORTANT** THE INSTRUCTIONS BELOW ARE ONLY FOR THOSE PATIENTS WHO HAVE  BEEN TOLD THEY WILL RECEIVE SEDATION OR GENERAL ANESTHESIA DURING THEIR MRI PROCEDURE:  IF YOU WILL RECEIVE SEDATION (take medicine to help you relax during your exam):   You must get the medicine from your doctor before you arrive. Bring the medicine to the exam. Do not take it at home.   Arrive one hour early. Bring someone who can take you home after the test. Your medicine will make you sleepy. After the exam, you may not drive, take a bus or take a taxi by yourself.   No eating 8 hours before your exam. You may have clear liquids up until 4 hours before your exam. (Clear liquids include water, clear tea, black coffee and fruit juice without pulp.)  IF YOU WILL RECEIVE ANESTHESIA (be asleep for your exam):   Arrive 1 1/2 hours early. Bring someone who can take you home after the test. You may not drive, take a bus or take a taxi by yourself.   No eating 8 hours before your exam. You may have clear liquids up until 4 hours before your exam. (Clear liquids include water, clear tea, black coffee and fruit juice without pulp.)  Please call the Imaging Department at your exam site with any questions.            Jun 08, 2017  2:30 PM CDT   (Arrive by 2:15 PM)   Return Multiple Sclerosis with Carlos Hunt MD   [facility] Multiple Sclerosis ([facility])    [address] [place] [address] [phone]              Who to contact     If you have questions or need follow up information about today's clinic visit or your schedule please contact [facility] directly at [phone].  Normal or non-critical lab and imaging results will be communicated to you by MyChart, letter or phone within 4 business days after the clinic has received the results. If you do not hear from us within 7 days, please contact the clinic through MyChart or phone. If you have a critical or abnormal lab result, we will notify you by phone as soon as possible.  Submit refill  "requests through [employer] or call your pharmacy and they will forward the refill request to us. Please allow 3 business days for your refill to be completed.          Additional Information About Your Visit        Event Farmhart Information     [employer] gives you secure access to your electronic health record. If you see a primary care provider, you can also send messages to your care team and make appointments. If you have questions, please call your primary care clinic.  If you do not have a primary care provider, please call [phone] and they will assist you.        Care EveryWhere ID     This is your Care EveryWhere ID. This could be used by other organizations to access your [facility] medical records  [id number]        Your Vitals Were     Pulse Temperature Height Last Period Pulse Oximetry BMI (Body Mass Index)    111 97.3  F (36.3  C) (Oral) 5' 7.75\" (1.721 m) 03/24/2017 97% 37.28 kg/m2       Blood Pressure from Last 3 Encounters:   03/24/17 122/78   01/02/17 134/80   12/08/16 129/74    Weight from Last 3 Encounters:   03/24/17 243 lb 6.4 oz (110.4 kg)   01/02/17 239 lb (108.4 kg)   12/08/16 237 lb (107.5 kg)              We Performed the Following     BARIATRIC ADULT REFERRAL     Basic metabolic panel     CBC with platelets differential     Ferritin     Hemoglobin A1c          Today's Medication Changes          These changes are accurate as of: 3/24/17  2:38 PM.  If you have any questions, ask your nurse or doctor.               These medicines have changed or have updated prescriptions.        Dose/Directions    venlafaxine 150 MG 24 hr capsule   Commonly known as:  EFFEXOR-XR   This may have changed:    - medication strength  - how much to take   Used for:  Anxiety   Changed by:  Zainab Aguilar MD        Dose:  150 mg   Take 1 capsule (150 mg) by mouth daily   Quantity:  90 capsule   Refills:  1            Where to get your medicines      These medications were sent to [facility] [id number] [place] [address] " [place], [place] - [address] AT [facility]  [address], [facility] [address]     Phone:  [phone]     venlafaxine 150 MG 24 hr capsule                Primary Care Provider Office Phone # Fax #    Zainab Aguilar [phone]       [address] [address]        Thank you!     Thank you for choosing [facility]  for your care. Our goal is always to provide you with excellent care. Hearing back from our patients is one way we can continue to improve our services. Please take a few minutes to complete the written survey that you may receive in the mail after your visit with us. Thank you!             Your Updated Medication List - Protect others around you: Learn how to safely use, store and throw away your medicines at www.disposemymeds.org.          This list is accurate as of: 3/24/17  2:38 PM.  Always use your most recent med list.                   Brand Name Dispense Instructions for use    ACE NOT PRESCRIBED (INTENTIONAL)     0 each    ACE Inhibitor not prescribed due to Other:no microalbuminuria       aspirin 81 MG tablet     30 tablet    Take by mouth daily       atorvastatin 40 MG tablet    LIPITOR    90 tablet    Take 1 tablet (40 mg) by mouth daily       AUBAGIO 14 MG tablet   Generic drug:  teriflunomide      Take by mouth daily Only available through select specialty pharmacies       blood glucose calibration solution     1 each    Use to calibrate blood glucose monitor as directed.       blood glucose monitoring lancets     3 Box    Use to test blood sugars 3 times daily or as directed.       blood glucose monitoring meter device kit    no brand specified    1 kit    Use to test blood sugar 1 times daily or as directed       blood glucose monitoring test strip    ONE TOUCH ULTRA    300 strip    Use to test blood sugars 3 times daily or as directed.       diltiazem 120 MG 24 hr capsule     CARDIZEM CD    30 capsule    Take 1 capsule (120 mg) by mouth daily       ferrous fumarate 65 mg ([place]. FE)-Vitamin C 125 mg  MG Tabs tablet    VITRON C    180 tablet    Take 1 tablet by mouth 2 times daily       fluconazole 150 MG tablet    DIFLUCAN    1 tablet    TAKE 1 TABLET BY MOUTH ONCE FOR 1 DOSE- REPEAT IN 3 DAYS IF SYMPTOMS PERSIST       insulin aspart 100 UNIT/ML injection    NovoLOG FLEXPEN    3 Month    Inject 15 Units Subcutaneous 3 times daily (with meals)       insulin degludec 100 UNIT/ML pen    TRESIBA    36 mL    Inject 36 units once daily       * insulin pen needle 32G X 4 MM    BD SAMANTHA U/F    100 each    Use once daily or as directed.       * insulin pen needle 29G X 12.7MM    BD ULTRA-FINE    100 each    Use once daily with victoza pen       * insulin pen needle 31G X 6 MM     300 each    Use 4 daily or as directed.       metFORMIN 500 MG 24 hr tablet    GLUCOPHAGE-XR    120 tablet    Take 2 tablets (1,000 mg) by mouth 2 times daily (with meals)       omeprazole 40 MG capsule    priLOSEC    180 capsule    TAKE ONE CAPSULE BY MOUTH TWICE DAILY       SENNA-PLUS PO      Take by mouth 2 times daily 2 tabs       TEGRETOL PO      Take 50 mg by mouth       Urea 20 % Crea     120 g    To dry and scaly feet twice a day as needed.       venlafaxine 150 MG 24 hr capsule    EFFEXOR-XR    90 capsule    Take 1 capsule (150 mg) by mouth daily       vitamin D 2000 UNITS tablet     180 tablet    Take 4,000 Units by mouth daily       * Notice:  This list has 3 medication(s) that are the same as other medications prescribed for you. Read the directions carefully, and ask your doctor or other care provider to review them with you.

## 2017-03-24 NOTE — PATIENT INSTRUCTIONS
- Start taking Victoza, 0.6 MG daily for 1 week, then 1.2 MG daily for a week, and then 1.8 MG daily.     - We can consider adding on Topamax in the future, but this tends to make people tired..     - You do qualify for bariatric surgery. There are surgeons that can do both bariatric surgery and hiatal hernia repair (Dr Garibay and partners)    - Start taking 150 MG Effexor once daily in the morning.     - Try sugar free candies at work.     - Return FIT card.     [facility]    If you have any questions regarding to your visit please contact your care team:     Team Pink:   Clinic Hours Telephone Number   Internal Medicine:  Dr. Zainab Geller, NP       7am-7pm  Monday - Thursday   7am-5pm  Fridays  [phone]  (Appointment scheduling available 24/7)    Questions about your visit?  Team Line  [phone]   Urgent Care - [place] - 11am-9pm Monday-Friday Saturday-Sunday- 9am-5pm   [place] - 5pm-9pm Monday-Friday Saturday-Sunday- 9am-5pm  [phone] - [place]   [phone] - [place]       What options do I have for visits at the clinic other than the traditional office visit?  To expand how we care for you, many of our providers are utilizing electronic visits (e-visits) and telephone visits, when medically appropriate, for interactions with their patients rather than a visit in the clinic.   We also offer nurse visits for many medical concerns. Just like any other service, we will bill your insurance company for this type of visit based on time spent on the phone with your provider. Not all insurance companies cover these visits. Please check with your medical insurance if this type of visit is covered. You will be responsible for any charges that are not paid by your insurance.      E-visits via [employer]:  generally incur a $35.00 fee.  Telephone visits:  Time spent on the phone: *charged based on time that is spent on the  phone in increments of 10 minutes. Estimated cost:   5-10 mins $30.00   11-20 mins. $59.00   21-30 mins. $85.00   Use BatesHookhart (secure email communication and access to your chart) to send your primary care provider a message or make an appointment. Ask someone on your Team how to sign up for Chugt.    For a Price Quote for your services, please call our [employer] Line at [phone].    As always, Thank you for trusting us with your health care needs!      Araseli Alegria, CMA

## 2017-03-24 NOTE — PROGRESS NOTES
INTERNAL MEDICINE   SUBJECTIVE:                                                    Maranda Mallory is a 46 year old female who presents to clinic today for the following health issues:    Diabetes Check: She has not started taking Victoza yet. She has been taught in the pharmacy how to use Victoza.  She wanted to discuss taking the medication today in clinic before she started taking it. She has been checking her blood sugars at home in the morning when she wakes up and before meals. In the morning her sugars tend to be around 200. The lowest measurement she has ever gotten is 150.     Weight: She has experienced a lot of weight gain. She relates that this has made her depressed as she has never weighed as much as she does now. She relates that when she is at work, she schedules appointments. This does not give her much time to stand or walk around. Her legs are very weak, making it hard for her to stand for long periods of time. She also relates that she feels exhausted all the time and she gets hungry. During work she will drink pop and have snacks so that she can be awake on the phone. She has tried to opt for healthier snacks, but they don't keep her as awake. It can be hard for her to eat healthy foods because she does not have the amount of teeth required to eat healthy foods. She knows that she needs dentures because so many of her teeth have been pulled in the past. She does not enjoy coffee or tea.   Wt Readings from Last 5 Encounters:   03/24/17 110.4 kg (243 lb 6.4 oz)   01/02/17 108.4 kg (239 lb)   12/08/16 107.5 kg (237 lb)   12/08/16 107.5 kg (237 lb)   11/30/16 105.7 kg (233 lb)     Additional Notes: She explains that MS is well controlled. She was told by her gastroenterologist that weight loss would prevent her needing to have surgery for correction. She also needs dentures, as the majority of her teeth have been pulled. She has been taking iron twice a day. Her insurance changes at the end of this  month. Towards the end of the consultation she explains that she thinks she has a yeast infection and would like to treat it with Diflucan as that has helped her in the past. She had a sleep study done that indicated that she has mild sleep apnea. She has not yet received the results herself.       Problem list and histories reviewed & adjusted, as indicated.  Additional history: as documented    Patient Active Problem List   Diagnosis     Obesity     Hyperlipidemia LDL goal <100     Cholelithiasis without obstruction     Non-alcoholic fatty liver disease     GERD (gastroesophageal reflux disease)     Hypertriglyceridemia     Esophageal dysmotility     Sliding hiatal hernia     Heart murmur     Recurrent UTI     Hypovitaminosis D     Health Care Home (Not Active)      Lumbar radiculopathy     KAITLIN (generalized anxiety disorder)     Type 2 diabetes mellitus without complication (H)     Paroxysmal supraventricular tachycardia (H)     Gastroesophageal reflux disease, esophagitis presence not specified     Atypical chest pain     HSIL (high grade squamous intraepithelial lesion) on Pap smear of cervix     Morbid obesity due to excess calories (H)     Pain of left upper extremity     Other abnormal cytological finding of specimen from cervix     Papanicolaou smear of cervix with low grade squamous intraepithelial lesion (LGSIL)     Physical deconditioning     SOB (shortness of breath)     Insomnia due to other mental disorder     Anxiety     Persistent disorder of initiating or maintaining sleep     Chronic daily headache     Excessive daytime sleepiness     MS (multiple sclerosis) (H)     Urinary frequency     Hypomagnesemia     Hypokalemia     Generalized hyperhidrosis     Elevation of level of transaminase or lactic acid dehydrogenase (LDH)     Anemia due to other cause     Past Surgical History:   Procedure Laterality Date     DILATE ESOPHAGUS  3/20/2013    Procedure: DILATE ESOPHAGUS;;  Surgeon: Anthony Watts MD;   Location:  GI     ESOPHAGEAL MOTILITY STUDY       ESOPHAGOSCOPY, GASTROSCOPY, DUODENOSCOPY (EGD), COMBINED  3/20/2013    Procedure: COMBINED ESOPHAGOSCOPY, GASTROSCOPY, DUODENOSCOPY (EGD), BIOPSY SINGLE OR MULTIPLE;;  Surgeon: Anthony Watts MD;  Location:  GI     HC DILATION/CURETTAGE DIAG/THER NON OB      D & C x5 with pregnancies     HC ESOPHAGOSCOPY WITH FOREIGN BODY REMOVAL       LEEP TX, CERVICAL  4/12/16    Negative     TUBAL LIGATION         Social History   Substance Use Topics     Smoking status: Former Smoker     Packs/day: 0.30     Years: 5.00     Types: Cigarettes     Quit date: 1/1/2005     Smokeless tobacco: Never Used      Comment: 1/2010     Alcohol use 1.2 oz/week     2 Standard drinks or equivalent per week      Comment: 2/ month     Family History   Problem Relation Age of Onset     C.A.D. Father      DIABETES Father      blind at age 60 as a result of diabetes     Glaucoma Father      Hypertension Father      C.A.D. Maternal Grandmother      DIABETES Maternal Grandmother      Hypertension Maternal Grandmother      C.A.D. Paternal Grandmother      DIABETES Paternal Grandmother      Hypertension Paternal Grandmother      CEREBROVASCULAR DISEASE Paternal Grandmother      Hypertension Mother      Asthma Daughter      DIABETES Maternal Aunt      DIABETES Maternal Uncle      DIABETES Paternal Uncle      DIABETES Paternal Uncle      DIABETES Paternal Uncle      DIABETES Paternal Uncle      CANCER No family hx of      Thyroid Disease No family hx of      Macular Degeneration No family hx of      Skin Cancer No family hx of          Labs reviewed in EPIC    Reviewed and updated as needed this visit by clinical staff       Reviewed and updated as needed this visit by Provider       ROS:  C: NEGATIVE for fever, chills. POSITIVE for weight gain.   E/M: NEGATIVE for ear, mouth and throat problems  GI: NEGATIVE for nausea, abdominal pain, heartburn, or change in bowel habits  M: NEGATIVE for significant  "arthralgias or myalgia  P: POSITIVE for depressed moods   All other systems reviewed and were negative.      This document serves as a record of the services and decisions personally performed and made by Zainab Aguilar MD. It was created on his/her behalf by Amisha Foster, a trained medical scribe. The creation of this document is based the provider's statements to the medical scribe.    Scribe Amisha Foster 2:09 PM, March 24, 2017    OBJECTIVE:                                                    /78 (BP Location: Right arm, Patient Position: Chair, Cuff Size: Adult Large)  Pulse 111  Temp 97.3  F (36.3  C) (Oral)  Ht 1.721 m (5' 7.75\")  Wt 110.4 kg (243 lb 6.4 oz)  LMP 03/24/2017  SpO2 97%  BMI 37.28 kg/m2  Body mass index is 37.28 kg/(m^2).  GENERAL: healthy, alert and no distress  NECK: no adenopathy, no asymmetry, masses, or scars and thyroid normal to palpation  RESP: lungs clear to auscultation - no rales, rhonchi or wheezes  CV: regular rate and rhythm, normal S1 S2, no S3 or S4, no murmur, click or rub, no peripheral edema and peripheral pulses strong  ABDOMEN: soft, nontender, no hepatosplenomegaly, no masses and bowel sounds normal  MS: no gross musculoskeletal defects noted, no edema  PSYCH: mentation appears normal, affect normal/bright but anxious    Diagnostic Test Results:  No results found for this or any previous visit (from the past 24 hour(s)).     ASSESSMENT/PLAN:                                                    I spent 26 minutes of time with the patient and >50% of it was in education and counseling regarding diabetes and weight loss management.     1. Sliding hiatal hernia   Will refer patient to bariatric surgeon for eval of hernia repair and sleeve gastrectomy.    2. Anxiety   Patient to start taking 150 MG daily.   - venlafaxine (EFFEXOR-XR) 150 MG 24 hr capsule; Take 1 capsule (150 mg) by mouth daily  Dispense: 90 capsule; Refill: 1    3. Hypokalemia    - Basic metabolic " panel    4. MS (multiple sclerosis) (H)   Patient has regular follow up with neurologist Dr. Torres.     5. Excessive daytime sleepiness  Not a candidate for a stimulant due to SVT    6. Type 2 diabetes mellitus with hyperosmolarity without coma, with long-term current use of insulin (H)  Restarting GLP1    - Hemoglobin A1c  - BARIATRIC ADULT REFERRAL    7. Other iron deficiency anemia   The current medical regimen is effective; continue present plan and medications  .  Needs to return FIT  - CBC with platelets differential  - Ferritin    8. BMI 37.0-37.9, adult   Patient will schedule appointment. GLP1 will help.  Topamax will likely make her too tired. She is also on tegretaol.   - BARIATRIC ADULT REFERRAL    Patient Instructions   - Start taking Victoza, 0.6 MG daily for 1 week, then 1.2 MG daily for a week, and then 1.8 MG daily.   - We can consider adding on Topamax in the future, but this tends to make people tired..   - You do qualify for bariatric surgery. There are surgeons that can do both bariatric surgery and hiatal hernia repair (Dr Garibay and partners)  - Start taking 150 MG Effexor once daily in the morning.   - Try sugar free candies at work.   - Return FIT card.         The information in this document, created by the medical scribe for me, accurately reflects the services I personally performed and the decisions made by me. I have reviewed and approved this document for accuracy prior to leaving the patient care area.  Zainab Aguilar MD  2:09 PM, 03/24/17    Zainab Aguilar MD  [facility]    Start: 2:11 PM   End: 2:37 PM

## 2017-03-24 NOTE — NURSING NOTE
"Chief Complaint   Patient presents with     Diabetes     Anxiety       Initial /78 (BP Location: Right arm, Patient Position: Chair, Cuff Size: Adult Large)  Pulse 111  Temp 97.3  F (36.3  C) (Oral)  Ht 5' 7.75\" (1.721 m)  Wt 243 lb 6.4 oz (110.4 kg)  LMP 03/24/2017  SpO2 97%  BMI 37.28 kg/m2 Estimated body mass index is 37.28 kg/(m^2) as calculated from the following:    Height as of this encounter: 5' 7.75\" (1.721 m).    Weight as of this encounter: 243 lb 6.4 oz (110.4 kg).  Medication Reconciliation: complete   Lacy HARDY CMA ([facility])      "

## 2017-03-28 ENCOUNTER — TELEPHONE (OUTPATIENT)
Dept: FAMILY MEDICINE | Facility: CLINIC | Age: 47
End: 2017-03-28

## 2017-03-28 DIAGNOSIS — Z12.83 SKIN CANCER SCREENING: ICD-10-CM

## 2017-03-28 DIAGNOSIS — B37.31 CANDIDIASIS OF VULVA AND VAGINA: ICD-10-CM

## 2017-03-28 DIAGNOSIS — E11.9 TYPE 2 DIABETES MELLITUS WITHOUT COMPLICATION (H): ICD-10-CM

## 2017-03-28 RX ORDER — LIRAGLUTIDE 6 MG/ML
INJECTION SUBCUTANEOUS
Qty: 9 ML | Refills: 3 | Status: SHIPPED | OUTPATIENT
Start: 2017-03-28 | End: 2017-10-23

## 2017-03-28 RX ORDER — FLUCONAZOLE 150 MG/1
TABLET ORAL
Qty: 1 TABLET | Refills: 1 | Status: CANCELLED | OUTPATIENT
Start: 2017-03-28

## 2017-03-28 NOTE — TELEPHONE ENCOUNTER
Last visit: 8/24/16- Med was not discussed  Next Visit: None scheduled    From 3/1/16 office visit: with Carmen Washburn  Assessment and Plan:   1. Xerosis of skin: Recommend conitnuing daily moisturization and starting Urea cream.  -Please continue to moisturize entire body daily, especially after showering.  -Please begin to apply Urea cream to both feet daily until improved.

## 2017-03-28 NOTE — TELEPHONE ENCOUNTER
Patient stated that provider was going to send in a prescription for Victoza an Diflucan at last OV however, she has not received.    Per 3/24/17 OV notes:  Instructions   Patient Instructions    - Start taking Victoza, 0.6 MG daily for 1 week, then 1.2 MG daily for a week, and then 1.8 MG daily.               Please advise    Nguyễn Howe RN

## 2017-03-29 ENCOUNTER — TELEPHONE (OUTPATIENT)
Dept: NURSING | Facility: CLINIC | Age: 47
End: 2017-03-29

## 2017-03-29 DIAGNOSIS — Z79.4 TYPE 2 DIABETES MELLITUS WITH HYPEROSMOLARITY WITHOUT COMA, WITH LONG-TERM CURRENT USE OF INSULIN (H): Primary | ICD-10-CM

## 2017-03-29 DIAGNOSIS — E11.00 TYPE 2 DIABETES MELLITUS WITH HYPEROSMOLARITY WITHOUT COMA, WITH LONG-TERM CURRENT USE OF INSULIN (H): Primary | ICD-10-CM

## 2017-03-29 RX ORDER — UREA 200 MG/G
CREAM TOPICAL
Qty: 120 G | Refills: 11 | Status: SHIPPED | OUTPATIENT
Start: 2017-03-29

## 2017-03-29 NOTE — TELEPHONE ENCOUNTER
Patient informed as written.  States she will call to schedule lab appointment    Monique Gonzalez MA

## 2017-03-29 NOTE — TELEPHONE ENCOUNTER
Received a refill request for urea 20% cream as the resident on call. Patient was last seen 8/24/16 by Dr. Amaya for skin check. Previously prescribed this cream by Dr. Washburn for xerosis. After reviewing the patient's chart and the assessment and plan, refill request was accepted.     Nicolle Vega MD  PGY-3 Dermatology  Pager: [phone]

## 2017-04-18 ENCOUNTER — OFFICE VISIT (OUTPATIENT)
Dept: OBGYN | Facility: CLINIC | Age: 47
End: 2017-04-18
Payer: COMMERCIAL

## 2017-04-18 VITALS
HEART RATE: 96 BPM | SYSTOLIC BLOOD PRESSURE: 143 MMHG | BODY MASS INDEX: 36.56 KG/M2 | TEMPERATURE: 97.7 F | DIASTOLIC BLOOD PRESSURE: 90 MMHG | HEIGHT: 68 IN | WEIGHT: 241.2 LBS

## 2017-04-18 DIAGNOSIS — Z01.411 ENCOUNTER FOR GYNECOLOGICAL EXAMINATION WITH ABNORMAL FINDING: Primary | ICD-10-CM

## 2017-04-18 DIAGNOSIS — B96.89 BV (BACTERIAL VAGINOSIS): ICD-10-CM

## 2017-04-18 DIAGNOSIS — N76.0 BV (BACTERIAL VAGINOSIS): ICD-10-CM

## 2017-04-18 DIAGNOSIS — B37.31 CANDIDIASIS OF VULVA AND VAGINA: ICD-10-CM

## 2017-04-18 DIAGNOSIS — R87.613 HSIL (HIGH GRADE SQUAMOUS INTRAEPITHELIAL LESION) ON PAP SMEAR OF CERVIX: ICD-10-CM

## 2017-04-18 LAB
ALBUMIN UR-MCNC: NEGATIVE MG/DL
APPEARANCE UR: CLEAR
BILIRUB UR QL STRIP: NEGATIVE
COLOR UR AUTO: YELLOW
GLUCOSE UR STRIP-MCNC: >=1000 MG/DL
HGB UR QL STRIP: ABNORMAL
KETONES UR STRIP-MCNC: 15 MG/DL
LEUKOCYTE ESTERASE UR QL STRIP: NEGATIVE
MICRO REPORT STATUS: ABNORMAL
NITRATE UR QL: NEGATIVE
NON-SQ EPI CELLS #/AREA URNS LPF: NORMAL /LPF
PH UR STRIP: 5.5 PH (ref 5–7)
RBC #/AREA URNS AUTO: NORMAL /HPF (ref 0–2)
SP GR UR STRIP: 1.02 (ref 1–1.03)
SPECIMEN SOURCE: ABNORMAL
URN SPEC COLLECT METH UR: ABNORMAL
UROBILINOGEN UR STRIP-ACNC: 0.2 EU/DL (ref 0.2–1)
WBC #/AREA URNS AUTO: NORMAL /HPF (ref 0–2)
WET PREP SPEC: ABNORMAL

## 2017-04-18 PROCEDURE — 99396 PREV VISIT EST AGE 40-64: CPT | Performed by: OBSTETRICS & GYNECOLOGY

## 2017-04-18 PROCEDURE — 87591 N.GONORRHOEAE DNA AMP PROB: CPT | Performed by: OBSTETRICS & GYNECOLOGY

## 2017-04-18 PROCEDURE — 88175 CYTOPATH C/V AUTO FLUID REDO: CPT | Performed by: OBSTETRICS & GYNECOLOGY

## 2017-04-18 PROCEDURE — 87624 HPV HI-RISK TYP POOLED RSLT: CPT | Performed by: OBSTETRICS & GYNECOLOGY

## 2017-04-18 PROCEDURE — 87210 SMEAR WET MOUNT SALINE/INK: CPT | Performed by: OBSTETRICS & GYNECOLOGY

## 2017-04-18 PROCEDURE — 81001 URINALYSIS AUTO W/SCOPE: CPT | Performed by: OBSTETRICS & GYNECOLOGY

## 2017-04-18 PROCEDURE — 87491 CHLMYD TRACH DNA AMP PROBE: CPT | Performed by: OBSTETRICS & GYNECOLOGY

## 2017-04-18 RX ORDER — METRONIDAZOLE 500 MG/1
500 TABLET ORAL 2 TIMES DAILY
Qty: 14 TABLET | Refills: 0 | Status: SHIPPED | OUTPATIENT
Start: 2017-04-18 | End: 2017-10-12

## 2017-04-18 NOTE — NURSING NOTE
"Chief Complaint   Patient presents with     Physical     annual and pap       Initial /90  Pulse 96  Temp 97.7  F (36.5  C) (Oral)  Ht 5' 7.75\" (1.721 m)  Wt 241 lb 3.2 oz (109.4 kg)  LMP 2017 (Exact Date)  BMI 36.95 kg/m2 Estimated body mass index is 36.95 kg/(m^2) as calculated from the following:    Height as of this encounter: 5' 7.75\" (1.721 m).    Weight as of this encounter: 241 lb 3.2 oz (109.4 kg).  BP completed using cuff size: X-large        The following HM Due: pap smear      The following patient reported/Care Every where data was sent to:  P ABSTRACT QUALITY INITIATIVES [[id number]]       patient has appointment for today  Niki Ramirez               "

## 2017-04-18 NOTE — PROGRESS NOTES
Maranda is a 46 year old  female who presents for annual exam.     Menses are regular q 28-30 days and normal lasting 5-7 days.  Menses flow: normal.  Patient's last menstrual period was 2017 (exact date).. Using tubal ligation for contraception.  She is not currently considering pregnancy.  Besides routine health maintenance, she has no other health concerns today.  She was diagnosed with MS in 2016.  She is pretty sad about the diagnosis, but has felt ok since the initial attack.  She reports periodic urge incontinence.  It seems to come at work.  She works in a  [email or web address] center and has 4min in am and 4min afternoon to void, but cannot leave her desk any other time.  GYNECOLOGIC HISTORY:  Menarche: 16  Age at first intercourse: 20 Number of lifetime partners: <6  Maranda is sexually active with male partner(s) and is currently in monogamous relationship .    History sexually transmitted infections:No STD history  STI testing offered?  Accepted  TERRA exposure: Unknown  History of abnormal Pap smear: YES - updated in Problem List and Health Maintenance accordingly  Family history of breast CA: No  Family history of uterine/ovarian CA: No    Family history of colon CA: No    HEALTH MAINTENANCE:  Cholesterol: (  Cholesterol   Date Value Ref Range Status   01/10/2017 192 <200 mg/dL Final   2017 165 <200 mg/dL Final    History of abnormal lipids: Yes  Mammo: 3/2016 . History of abnormal Mammo: No, .  Regular Self Breast Exams: Yes  Calcium/Vitamin D intake: source:  dietary supplement(s) Adequate? Yes  TSH: (  TSH   Date Value Ref Range Status   01/10/2017 1.27 0.40 - 4.00 mU/L Final    )  Pap; (  Lab Results   Component Value Date    PAP LSIL 2016    PAP NIL 2013    PAP NIL 2010    )    HISTORY:  Obstetric History       T4      TAB0   SAB3   E0   M0   L6       # Outcome Date GA Lbr Luther/2nd Weight Sex Delivery Anes PTL Lv   9 Term 05 37w0d  8 lb 11 oz (3.941 kg) F     Y      Name: Rosy   8  10/06/00 36w0d  8 lb 9 oz (3.884 kg) F    Y   7 Term 99 37w0d  7 lb 11 oz (3.487 kg) M    Y   6 Term 93 37w0d  7 lb 11 oz (3.487 kg) F    Y   5 Term 92 37w0d  7 lb 5 oz (3.317 kg) M    Y   4  90 36w0d  7 lb 6 oz (3.345 kg) F FORCEPS   Y   3 SAB            2 SAB            1 SAB                 Past Medical History:   Diagnosis Date     Acne      Allergic rhinitis 2009    Skin Tests- Cat/Dog/DM/T/G/W     Allergies      Anxiety      Bacterial vaginosis 2011    Chronic, sometimes with yeast  3/20/12: + BV and + yeast--treated with Metronidazole X 10 days and Diflucan X 3 doses to be taken afterward.  JMS      Depressive disorder, not elsewhere classified     Sees a therapist but no meds     Esophageal dysmotility 2014    Goes to the [facility] - will get food stuck in throat and will need EGD to remove it     Gestational diabetes      H/O colposcopy with cervical biopsy 3/7/16    LSIL, can't exclude HSIL     Heart murmur 2014     High risk HPV infection 2016    LSIL +HPV 16     Hypertension     borderline     Mixed hyperlipidemia     Takes Lipitor     Sliding hiatal hernia 2014     Type 2 diabetes mellitus (H)     diet controlled     Type II or unspecified type diabetes mellitus without mention of complication, not stated as uncontrolled      Urinary tract infection, site not specified     Recurrent UTI's couple times a year     Vitamin D deficiency disease 2013     Past Surgical History:   Procedure Laterality Date     DILATE ESOPHAGUS  3/20/2013    Procedure: DILATE ESOPHAGUS;;  Surgeon: Anthony Watts MD;  Location:  GI     ESOPHAGEAL MOTILITY STUDY       ESOPHAGOSCOPY, GASTROSCOPY, DUODENOSCOPY (EGD), COMBINED  3/20/2013    Procedure: COMBINED ESOPHAGOSCOPY, GASTROSCOPY, DUODENOSCOPY (EGD), BIOPSY SINGLE OR MULTIPLE;;  Surgeon: Anthony Watts MD;  Location:  GI     HC DILATION/CURETTAGE DIAG/THER NON  OB      D & C x5 with pregnancies     HC ESOPHAGOSCOPY WITH FOREIGN BODY REMOVAL       LEEP TX, CERVICAL  4/12/16    Negative     TUBAL LIGATION       Family History   Problem Relation Age of Onset     C.A.D. Father      DIABETES Father      blind at age 60 as a result of diabetes     Glaucoma Father      Hypertension Father      C.A.D. Maternal Grandmother      DIABETES Maternal Grandmother      Hypertension Maternal Grandmother      C.A.D. Paternal Grandmother      DIABETES Paternal Grandmother      Hypertension Paternal Grandmother      CEREBROVASCULAR DISEASE Paternal Grandmother      Hypertension Mother      Asthma Daughter      DIABETES Maternal Aunt      DIABETES Maternal Uncle      DIABETES Paternal Uncle      DIABETES Paternal Uncle      DIABETES Paternal Uncle      DIABETES Paternal Uncle      CANCER No family hx of      Thyroid Disease No family hx of      Macular Degeneration No family hx of      Skin Cancer No family hx of      Social History     Social History     Marital status: Single     Spouse name: N/A     Number of children: 5     Years of education: 13     Occupational History     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       [employer]     Social History Main Topics     Smoking status: Former Smoker     Packs/day: 0.30     Years: 5.00     Types: Cigarettes     Quit date: 1/1/2005     Smokeless tobacco: Never Used      Comment: 1/2010     Alcohol use 1.2 oz/week     2 Standard drinks or equivalent per week      Comment: occ     Drug use: No     Sexual activity: Yes     Partners: Male     Birth control/ protection: Surgical      Comment: Tubal     Other Topics Concern     Parent/Sibling W/ Cabg, Mi Or Angioplasty Before 65f 55m? Yes     Mom , grandma and great grandma     Social History Narrative    Caffeine intake/servings daily - 1    Calcium intake/servings daily - 3    Exercise 0 times weekly - describe NA    Sunscreen used - No    Seatbelts used - Yes    Guns stored in the home - No    Self Breast Exam  - Yes    Pap test up to date -  Yes    Eye exam up to date -  Yes    Dental exam up to date -  Yes    DEXA scan up to date -  Not Applicable    Flex Sig/Colonoscopy up to date -  Not Applicable    Mammography up to date -  Not Applicable    Immunizations reviewed and up to date - Yes    Abuse: Current or Past (Physical, Sexual or Emotional) - No    Do you feel safe in your environment - Yes    Do you cope well with stress - Yes    Do you suffer from insomnia - No    Last updated by: Maame Mclaughlin  3/15/2011                   Current Outpatient Prescriptions:      Urea 20 % CREA, To dry and scaly feet twice a day as needed., Disp: 120 g, Rfl: 11     ferrous fumarate 65 mg, Lime. FE,-Vitamin C 125 mg (VITRON C)  MG TABS tablet, Take 1 tablet by mouth daily, Disp: 90 tablet, Rfl: 1     liraglutide (VICTOZA) 18 MG/3ML soln, Start with 0.6 MG daily for 1 week, then 1.2 MG daily for a week, and then 1.8 MG daily, Disp: 9 mL, Rfl: 3     venlafaxine (EFFEXOR-XR) 150 MG 24 hr capsule, Take 1 capsule (150 mg) by mouth daily, Disp: 90 capsule, Rfl: 1     insulin degludec (TRESIBA) 100 UNIT/ML pen, Inject 36 units once daily, Disp: 36 mL, Rfl: 1     blood glucose monitoring (NO BRAND SPECIFIED) meter device kit, Use to test blood sugar 1 times daily or as directed, Disp: 1 kit, Rfl: 0     blood glucose monitoring (ONE TOUCH DELICA) lancets, Use to test blood sugars 3 times daily or as directed., Disp: 3 Box, Rfl: 3     blood glucose monitoring (ONE TOUCH ULTRA) test strip, Use to test blood sugars 3 times daily or as directed., Disp: 300 strip, Rfl: 3     atorvastatin (LIPITOR) 40 MG tablet, Take 1 tablet (40 mg) by mouth daily, Disp: 90 tablet, Rfl: 1     fluconazole (DIFLUCAN) 150 MG tablet, TAKE 1 TABLET BY MOUTH ONCE FOR 1 DOSE- REPEAT IN 3 DAYS IF SYMPTOMS PERSIST, Disp: 1 tablet, Rfl: 1     teriflunomide (AUBAGIO) 14 MG tablet, Take by mouth daily Only available through select specialty pharmacies, Disp: , Rfl:       CarBAMazepine (TEGRETOL PO), Take 50 mg by mouth, Disp: , Rfl:      insulin aspart (NOVOLOG FLEXPEN) 100 UNIT/ML injection, Inject 15 Units Subcutaneous 3 times daily (with meals), Disp: 3 Month, Rfl: 1     omeprazole (PRILOSEC) 40 MG capsule, TAKE ONE CAPSULE BY MOUTH TWICE DAILY, Disp: 180 capsule, Rfl: 3     Sennosides-Docusate Sodium (SENNA-PLUS PO), Take by mouth 2 times daily 2 tabs, Disp: , Rfl:      insulin pen needle 31G X 6 MM, Use 4 daily or as directed., Disp: 300 each, Rfl: 11     Cholecalciferol (VITAMIN D) 2000 UNITS tablet, Take 4,000 Units by mouth daily, Disp: 180 tablet, Rfl: 3     metFORMIN (GLUCOPHAGE-XR) 500 MG 24 hr tablet, Take 2 tablets (1,000 mg) by mouth 2 times daily (with meals), Disp: 120 tablet, Rfl: 3     insulin pen needle (BD ULTRA-FINE) 29G X 12.7MM, Use once daily with victoza pen, Disp: 100 each, Rfl: 11     insulin pen needle (BD SAMANTHA U/F) 32G X 4 MM, Use once daily or as directed., Disp: 100 each, Rfl: 3     ACE NOT PRESCRIBED, INTENTIONAL,, ACE Inhibitor not prescribed due to Other:no microalbuminuria, Disp: 0 each, Rfl: 0     diltiazem (CARDIZEM CD) 120 MG 24 hr CD capsule, Take 1 capsule (120 mg) by mouth daily, Disp: 30 capsule, Rfl: 1     Blood Glucose Calibration (FREESTYLE CONTROL SOLUTION) LIQD, Use to calibrate blood glucose monitor as directed., Disp: 1 each, Rfl: 0     aspirin 81 MG tablet, Take by mouth daily, Disp: 30 tablet, Rfl: 3     Allergies   Allergen Reactions     Buspar [Buspirone]      sleepy     Codeine Nausea and Vomiting     have tolerated Vicodin     Eryc [Erythromycin] Nausea and Vomiting     Fluoxetine Other (See Comments)     anxious     Metformin Diarrhea     Morphine Nausea and Vomiting       Past medical, surgical, social and family history were reviewed and updated in EPIC.    ROS:   C:     NEGATIVE for fever, chills, change in weight  I:       NEGATIVE for worrisome rashes, moles or lesions  E:     NEGATIVE for vision changes or  "irritation  E/M: NEGATIVE for ear, mouth and throat problems  R:     NEGATIVE for significant cough or SOB  CV:   NEGATIVE for chest pain, palpitations or peripheral edema  GI:     NEGATIVE for nausea, abdominal pain, heartburn, or change in bowel habits  :   NEGATIVE for frequency, dysuria, hematuria, vaginal discharge, or irregular bleeding  M:     NEGATIVE for significant arthralgias or myalgia  N:      NEGATIVE for weakness, dizziness or paresthesias  E:      NEGATIVE for temperature intolerance, skin/hair changes  P:      NEGATIVE for changes in mood or affect.    EXAM:  /90  Pulse 96  Temp 97.7  F (36.5  C) (Oral)  Ht 5' 7.75\" (1.721 m)  Wt 241 lb 3.2 oz (109.4 kg)  LMP 03/20/2017 (Exact Date)  BMI 36.95 kg/m2   BMI: Body mass index is 36.95 kg/(m^2).  Constitutional: healthy, alert and no distress  Head: Normocephalic. No masses, lesions, tenderness or abnormalities  Neck: Neck supple. Trachea midline. No adenopathy. Thyroid symmetric, normal size.   Cardiovascular: RRR.   Respiratory: Negative.   Breast: No nodularity, asymmetry or nipple discharge bilaterally.  Gastrointestinal: Abdomen soft, non-tender, non-distended. No masses, organomegaly.  :  Vulva:  No external lesions, normal female hair distribution, no inguinal adenopathy.    Urethra:  Midline, non-tender, well supported, no discharge  Vagina:  Moist, pink, no abnormal discharge, no lesions  Uterus:  Normal size, non-tender, freely mobile  Ovaries:  No masses appreciated, non-tender, mobile  Rectal Exam: deferred  Musculoskeletal: extremities normal  Skin: no suspicious lesions or rashes  Psychiatric: Affect appropriate, cooperative,mentation appears normal.     COUNSELING:   Reviewed preventive health counseling, as reflected in patient instructions  Special attention given to:        Regular exercise       Healthy diet/nutrition       Osteoporosis Prevention/Bone Health       Safe sex practices/STD prevention       (Nguyen)menopause " management   reports that she quit smoking about 12 years ago. Her smoking use included Cigarettes. She has a 1.50 pack-year smoking history. She has never used smokeless tobacco.    Body mass index is 36.95 kg/(m^2).  Weight management plan: Discussed healthy diet and exercise guidelines and patient will follow up in 12 months in clinic to re-evaluate.  FRAX Risk Assessment    ASSESSMENT:  46 year old female with satisfactory annual exam  Plan: dx pap done, if nil cotesting in 12 months.  If abnormal, colp.  Gc/ct  And wet prep done.  Discussed timed voiding and avoiding bladder irritants (caffeine, alcohol, etc) and UA sent.  mammo due, just received letter, so will make appt.  RTC yearly or prn.    KUSUM SOFIA MD

## 2017-04-19 DIAGNOSIS — E11.9 TYPE 2 DIABETES MELLITUS WITHOUT COMPLICATION (H): ICD-10-CM

## 2017-04-19 LAB
C TRACH DNA SPEC QL NAA+PROBE: NORMAL
N GONORRHOEA DNA SPEC QL NAA+PROBE: NORMAL
SPECIMEN SOURCE: NORMAL
SPECIMEN SOURCE: NORMAL

## 2017-04-19 NOTE — TELEPHONE ENCOUNTER
Prescription approved per [facility] Refill Protocol.    Signed Prescriptions:                        Disp   Refills    metFORMIN (GLUCOPHAGE) 500 MG tablet       120 ta*4        Sig: TAKE 2 TABLETS BY MOUTH TWICE DAILY WITH MEALS  Authorizing Provider: MIKI ABRAHAM  Ordering User: RACIEL MONTEZ, RN, BSN

## 2017-04-19 NOTE — TELEPHONE ENCOUNTER
metFORMIN (GLUCOPHAGE-XR) 500 MG 24 hr tablet         Last Written Prescription Date: 10/11/2016  Last Fill Quantity: 120, # refills: 3  Last Office Visit with G, MATT or [facility] prescribing provider:  03/24/2017        BP Readings from Last 3 Encounters:   04/18/17 143/90   03/24/17 122/78   01/02/17 134/80     Lab Results   Component Value Date    MICROL 71 09/28/2016     Lab Results   Component Value Date    UMALCR 58.51 09/28/2016     Creatinine   Date Value Ref Range Status   03/24/2017 0.57 0.52 - 1.04 mg/dL Final   ]  GFR Estimate   Date Value Ref Range Status   03/24/2017 >90  Non  GFR Calc   >60 mL/min/1.7m2 Final   11/27/2016 >90  Non  GFR Calc   >60 mL/min/1.7m2 Final   11/14/2016 77 >60 mL/min/1.7m2 Final     Comment:     Non  GFR Calc     GFR Estimate If Black   Date Value Ref Range Status   03/24/2017 >90   GFR Calc   >60 mL/min/1.7m2 Final   11/27/2016 >90   GFR Calc   >60 mL/min/1.7m2 Final   11/14/2016 >90   GFR Calc   >60 mL/min/1.7m2 Final     Lab Results   Component Value Date    CHOL 192 01/10/2017     Lab Results   Component Value Date    HDL 51 01/10/2017     Lab Results   Component Value Date    LDL 78 01/10/2017     Lab Results   Component Value Date    TRIG 316 01/10/2017     Lab Results   Component Value Date    CHOLHDLRATIO 4.2 01/10/2015     Lab Results   Component Value Date    AST 37 01/10/2017     Lab Results   Component Value Date    ALT 38 01/10/2017     Lab Results   Component Value Date    A1C 9.0 03/24/2017    A1C 9.2 01/10/2017    A1C 9.4 01/07/2017    A1C 9.0 09/17/2016    A1C 8.1 04/05/2016     Potassium   Date Value Ref Range Status   03/24/2017 3.9 3.4 - 5.3 mmol/L Final     An JOSEPHINE Mchugh

## 2017-04-20 LAB
COPATH REPORT: NORMAL
PAP: NORMAL

## 2017-04-24 ENCOUNTER — MYC MEDICAL ADVICE (OUTPATIENT)
Dept: OBGYN | Facility: CLINIC | Age: 47
End: 2017-04-24

## 2017-04-24 DIAGNOSIS — B37.31 CANDIDIASIS OF VULVA AND VAGINA: ICD-10-CM

## 2017-04-24 LAB
FINAL DIAGNOSIS: NORMAL
HPV HR 12 DNA CVX QL NAA+PROBE: NEGATIVE
HPV16 DNA SPEC QL NAA+PROBE: NEGATIVE
HPV18 DNA SPEC QL NAA+PROBE: NEGATIVE
SPECIMEN DESCRIPTION: NORMAL

## 2017-04-24 RX ORDER — FLUCONAZOLE 150 MG/1
150 TABLET ORAL ONCE
Qty: 3 TABLET | Refills: 1 | Status: SHIPPED | OUTPATIENT
Start: 2017-04-24 | End: 2017-05-19

## 2017-04-25 ENCOUNTER — MYC MEDICAL ADVICE (OUTPATIENT)
Dept: FAMILY MEDICINE | Facility: CLINIC | Age: 47
End: 2017-04-25

## 2017-04-25 NOTE — TELEPHONE ENCOUNTER
Next 5 appointments (look out 90 days)     Apr 26, 2017 10:00 AM CDT   [facility]    [address]  [place] [address]   [phone]              Tatyana Ruiz,

## 2017-04-26 ENCOUNTER — CARE COORDINATION (OUTPATIENT)
Dept: CARE COORDINATION | Facility: CLINIC | Age: 47
End: 2017-04-26

## 2017-04-26 ENCOUNTER — OFFICE VISIT (OUTPATIENT)
Dept: FAMILY MEDICINE | Facility: CLINIC | Age: 47
End: 2017-04-26
Payer: COMMERCIAL

## 2017-04-26 VITALS
SYSTOLIC BLOOD PRESSURE: 122 MMHG | TEMPERATURE: 96.4 F | BODY MASS INDEX: 36.55 KG/M2 | DIASTOLIC BLOOD PRESSURE: 84 MMHG | HEART RATE: 95 BPM | OXYGEN SATURATION: 96 % | WEIGHT: 238.6 LBS

## 2017-04-26 DIAGNOSIS — E11.00 TYPE 2 DIABETES MELLITUS WITH HYPEROSMOLARITY WITHOUT COMA, WITH LONG-TERM CURRENT USE OF INSULIN (H): ICD-10-CM

## 2017-04-26 DIAGNOSIS — Z59.9 FINANCIAL DIFFICULTIES: ICD-10-CM

## 2017-04-26 DIAGNOSIS — G35 MS (MULTIPLE SCLEROSIS) (H): Primary | ICD-10-CM

## 2017-04-26 DIAGNOSIS — Z79.4 TYPE 2 DIABETES MELLITUS WITH HYPEROSMOLARITY WITHOUT COMA, WITH LONG-TERM CURRENT USE OF INSULIN (H): ICD-10-CM

## 2017-04-26 PROCEDURE — 99213 OFFICE O/P EST LOW 20 MIN: CPT | Performed by: INTERNAL MEDICINE

## 2017-04-26 SDOH — ECONOMIC STABILITY - INCOME SECURITY: PROBLEM RELATED TO HOUSING AND ECONOMIC CIRCUMSTANCES, UNSPECIFIED: Z59.9

## 2017-04-26 ASSESSMENT — PAIN SCALES - GENERAL: PAINLEVEL: EXTREME PAIN (8)

## 2017-04-26 NOTE — PATIENT INSTRUCTIONS
The Care Coordinator will call you to see if there's anything else they can do to help you out medically.     [facility]    If you have any questions regarding to your visit please contact your care team:     Team Pink:   Clinic Hours Telephone Number   Internal Medicine:  Dr. Zainab Geller NP       7am-7pm  Monday - Thursday   7am-5pm  Fridays  [phone]  (Appointment scheduling available 24/7)    Questions about your visit?  Team Line  [phone]   Urgent Care - [place] - 11am-9pm Monday-Friday Saturday-Sunday- 9am-5pm   [place] - 5pm-9pm Monday-Friday Saturday-Sunday- 9am-5pm  [phone] - [facility]  [phone] - [place]       What options do I have for visits at the clinic other than the traditional office visit?  To expand how we care for you, many of our providers are utilizing electronic visits (e-visits) and telephone visits, when medically appropriate, for interactions with their patients rather than a visit in the clinic.   We also offer nurse visits for many medical concerns. Just like any other service, we will bill your insurance company for this type of visit based on time spent on the phone with your provider. Not all insurance companies cover these visits. Please check with your medical insurance if this type of visit is covered. You will be responsible for any charges that are not paid by your insurance.      E-visits via [employer]:  generally incur a $35.00 fee.  Telephone visits:  Time spent on the phone: *charged based on time that is spent on the phone in increments of 10 minutes. Estimated cost:   5-10 mins $30.00   11-20 mins. $59.00   21-30 mins. $85.00   Use YesWeAdt (secure email communication and access to your chart) to send your primary care provider a message or make an appointment. Ask someone on your Team how to sign up for [employer].    For a Price Quote for your services, please call our  Consumer [employer]eger Line at [phone].    As always, Thank you for trusting us with your health care needs!    Discharged by Lacy HARDY CMA ([facility])

## 2017-04-26 NOTE — PROGRESS NOTES
INTERNAL MEDICINE   SUBJECTIVE:                                                    Maranda Mallory is a 46 year old female who presents to clinic today for the following health issues: dizziness and extreme weakness      Dizziness/weakness - Patient has felt dizzy, her legs feel like noodles, and she has terrible lower back and neck pain. Patient feels extremely weak. She is dizzy to the point where the room spins and only relief is when she lays down and closes her eyes; feeling like she's going to throw up. Today she feels better in absence of dizziness but still feels weak. She is unsure if these are symptoms of her MS.     Depression - Patient notes her depression is at an all time high. Her health insurance ends at the end of the month and she has to wait until fall to reapply through work.  Her refrigerated medications were ruined with her electricity shut off. Because she's working she does not qualify for many med assistance programs. She might qualify for short term disability.       Problem list and histories reviewed & adjusted, as indicated.  Additional history: as documented    Labs reviewed in EPIC  Reviewed and updated as needed this visit by clinical staff  Reviewed and updated as needed this visit by Provider    ROS:  C: NEGATIVE for fever, chills, change in weight  R: NEGATIVE for significant cough or SOB  CV: NEGATIVE for chest pain, palpitations or peripheral edema  GI: NEGATIVE for abdominal pain, heartburn, or change in bowel habits. POSITIVE for intermittent nausea   M: NEGATIVE for significant arthralgias or myalgia  N: NEGATIVE for paresthesias. POSITIVE for weakness and dizziness  P: NEGATIVE for changes in mood or affect    This document serves as a record of the services and decisions personally performed and made by Zainab Aguilar MD. It was created on his/her behalf by Anjelica Chong, trained medical scribe. The creation of this document is based the provider's statements to the medical  letychelseajonny.    Emerita Chong 10:08 AM, April 26, 2017  OBJECTIVE:                                                    /84 (BP Location: Right arm, Cuff Size: Adult Regular)  Pulse 95  Temp 96.4  F (35.8  C) (Oral)  Wt 108.2 kg (238 lb 9.6 oz)  LMP 03/20/2017 (Exact Date)  SpO2 96%  Breastfeeding? No  BMI 36.55 kg/m2  Body mass index is 36.55 kg/(m^2).  GENERAL: alert and in acute distress  NECK: no tenderness over trapezius or sternocleidomastoid, no pain over the c spine  RESP: lungs clear to auscultation - no rales, rhonchi or wheezes  CV: regular rate and rhythm, normal S1 S2, no S3 or S4, no murmur, click or rub  NEURO: Normal strength and tone, mentation intact and speech normal  PSYCH: mentation appears normal, affect normal/tearful     Diagnostic Test Results: none      ASSESSMENT/PLAN:                                                    1. MS (multiple sclerosis) (H)  Discussed that the dizziness and weakness she's experiencing is likely dt her MS. Patient has regular follow up with neurologist Dr. Torres.   - CARE COORDINATION REFERRAL    2. Type 2 diabetes mellitus with hyperosmolarity without coma, with long-term current use of insulin (H)  Not well controlled. She continues on Victoza, Novolog, Tresiba, and Metformin.      3. Financial difficulties  Patient is having difficulties making ends meet. Her utilities have been turned off causing her medications to spoil. At the end of the month her health insurance ends and she is not able to reapply for health insurance through her work until the fall. She met with the care coordinator today in clinic to discuss her options and see if she qualifies for any medical assistance or help going forward.   - CARE COORDINATION REFERRAL      Patient Instructions     The Care Coordinator will call you to see if there's anything else they can do to help you out medically.     [facility]    If you have any questions regarding to your  visit please contact your care team:     Team Pink:   Clinic Hours Telephone Number   Internal Medicine:  Dr. Zainab Geller, NP       7am-7pm  Monday - Thursday   7am-5pm  Fridays  [phone]  (Appointment scheduling available 24/7)    Questions about your visit?  Team Line  [phone]   [facility] [place] [facility] 11am-9pm Monday-Friday Saturday-Sunday- 9am-5pm   [place] - 5pm-9pm Monday-Friday Saturday-Sunday- 9am-5pm  [phone] - [place]   [phone] - [place]       What options do I have for visits at the clinic other than the traditional office visit?  To expand how we care for you, many of our providers are utilizing electronic visits (e-visits) and telephone visits, when medically appropriate, for interactions with their patients rather than a visit in the clinic.   We also offer nurse visits for many medical concerns. Just like any other service, we will bill your insurance company for this type of visit based on time spent on the phone with your provider. Not all insurance companies cover these visits. Please check with your medical insurance if this type of visit is covered. You will be responsible for any charges that are not paid by your insurance.      E-visits via [employer]:  generally incur a $35.00 fee.  Telephone visits:  Time spent on the phone: *charged based on time that is spent on the phone in increments of 10 minutes. Estimated cost:   5-10 mins $30.00   11-20 mins. $59.00   21-30 mins. $85.00   Use Myxert (secure email communication and access to your chart) to send your primary care provider a message or make an appointment. Ask someone on your Team how to sign up for [employer].    For a Price Quote for your services, please call our Consumer Price Line at [phone].    As always, Thank you for trusting us with your health care needs!    Discharged by Lacy HARDY CMA ([facility])      I spent 16 minutes of time with the  patient and >50% of it was in education and counseling regarding dizziness and extreme weakness.    The information in this document, created by the medical scribe for me, accurately reflects the services I personally performed and the decisions made by me. I have reviewed and approved this document for accuracy prior to leaving the patient care area.  Zainab Aguilar MD  10:08 AM, 04/26/17    Zainab Aguilar MD  [facility]    Start 10:07 AM  End 10:23 AM

## 2017-04-26 NOTE — NURSING NOTE
"Chief Complaint   Patient presents with     Dizziness     Musculoskeletal Problem     Low back pain and weakness in bialteral legs     Other     Patient requesting letter for work with that okay to go back       Initial /84 (BP Location: Right arm, Cuff Size: Adult Regular)  Pulse 95  Temp 96.4  F (35.8  C) (Oral)  Wt 238 lb 9.6 oz (108.2 kg)  LMP 03/20/2017 (Exact Date)  SpO2 96%  Breastfeeding? No  BMI 36.55 kg/m2 Estimated body mass index is 36.55 kg/(m^2) as calculated from the following:    Height as of 4/18/17: 5' 7.75\" (1.721 m).    Weight as of this encounter: 238 lb 9.6 oz (108.2 kg).  Medication Reconciliation: complete       Lisa Kee, CMA      "

## 2017-04-26 NOTE — LETTER
[facility]  Complex Care Plan  About Me  Patient Name:  Maranda Mallory    YOB: 1970  Age:   46 year old   José Luis MRN: [id number] Telephone Information:     Home Phone [phone]   Mobile [phone]       Address:    [address]    [facility] [address] Email address:  [email or web address]      Emergency Contact(s)  Name Relationship Lgl Grd Work Phone Home Phone Mobile Phone   1. BEKAH MALLORY Father  [phone]    2. NO SECONDARY C*    NONE            Primary language:  English     needed? No   [employer]:  [phone] op. 1  Other communication barriers: No  Preferred Method of Communication:  Phone, Letter  Current living arrangement: I live in a private home with family (Children)  Mobility Status/ Medical Equipment: Independent  Other information to know about me:    Health Maintenance  Health Maintenance Reviewed: Not assessed    My Access Plan  Medical Emergency 911   Primary Clinic Line [facility]- [phone]   24 Hour Appointment Line [phone] or  [phone]) (toll-free)   24 Hour Nurse Line [phone] (toll-free)   Preferred Urgent Care     Preferred Hospital [facility]  [phone]   [facility] [address] [place] [address] [address] AT SEC OF [address] [facility] Crisis Line [facility], [phone] or 911     My Care Team Members  Patient Care Team       Relationship Specialty Notifications Start End    Zainab Aguilar MD PCP - General Internal Medicine  5/1/14     Phone: [phone] Fax: [phone]         [facility] [address] [facility] [address]    Perla Harding MD PCP - Obstetrics/Gynecology   2/15/05     Phone: [phone] Fax: [phone]         [facility] [address] [facility] [address] [facility] [address]     Ayse Wells MD MD Internal Medicine  4/9/15     Phone: [phone] Fax: [phone]         [address] [address]         My Care Plans  Self Management and Treatment Plan  Goals and (Comments)  Goal #1: I will contact my human resources department to discuss applying for medical insurance coverage.      10% of goal reached    Goal #2:          of goal reached    Goal #3:          of goal reached    Goal #4:         of goal reached     Goal #5:          of goal reached  -  Goal #6:          of goal reached    Goal #7:          of goal reached    Goal #8:           of goal reached        Goal #9:          of goal reached    Goal #10:        of goal reached    Action Plans on File:    Advance Care Plans/Directives Type:        My Medical and Care Information  Problem List   Patient Active Problem List   Diagnosis     Obesity     Hyperlipidemia LDL goal <100     Cholelithiasis without obstruction     Non-alcoholic fatty liver disease     GERD (gastroesophageal reflux disease)     Hypertriglyceridemia     Esophageal dysmotility     Sliding hiatal hernia     Heart murmur     Recurrent UTI     Hypovitaminosis D     Health Care Home (Not Active)      Lumbar radiculopathy     KAITLIN (generalized anxiety disorder)     Type 2 diabetes mellitus without complication (H)     Paroxysmal supraventricular tachycardia (H)     Gastroesophageal reflux disease, esophagitis presence not specified     Atypical chest pain     HSIL (high grade squamous intraepithelial lesion) on Pap smear of cervix     Morbid obesity due to excess calories (H)     Pain of left upper extremity     Other abnormal cytological finding of specimen from cervix     Papanicolaou smear of cervix with low grade squamous intraepithelial lesion (LGSIL)     Physical deconditioning     SOB (shortness of breath)     Insomnia due to other mental disorder     Anxiety     Persistent disorder of initiating or maintaining sleep      Chronic daily headache     Excessive daytime sleepiness     MS (multiple sclerosis) (H)     Urinary frequency     Hypomagnesemia     Hypokalemia     Generalized hyperhidrosis     Elevation of level of transaminase or lactic acid dehydrogenase (LDH)     Anemia due to other cause     Type 2 diabetes mellitus with hyperosmolarity without coma, with long-term current use of insulin (H)     Other iron deficiency anemia     BMI 37.0-37.9, adult      Current Medications and Allergies:  See printed Medication Report.    Care Coordination Start Date: 11/30/16   Frequency of Care Coordination: TBD   Form Last Updated: 04/26/2017

## 2017-04-26 NOTE — LETTER
[address] [address]  Phone: [phone]    April 26, 2017        Maranda Mallory  [address] [facility] [address]  [facility] [address]          To whom it may concern:    RE: Maranda Mallory    Patient was seen and treated today at our clinic and missed work from April 21-26 due to a medical condition.  She can return to work without restrictions on April 27th.     Please contact me for questions or concerns.      Sincerely,        Zainab Aguilar MD

## 2017-04-26 NOTE — MR AVS SNAPSHOT
After Visit Summary   4/26/2017    Maranda Mallory    MRN: [id number]           Patient Information     Date Of Birth          1970        Visit Information        Provider Department      4/26/2017 10:00 AM Zainab Aguilar MD [facility]        Today's Diagnoses     MS (multiple sclerosis) (H)    -  1    Type 2 diabetes mellitus with hyperosmolarity without coma, with long-term current use of insulin (H)        Financial difficulties          Care Instructions    The Care Coordinator will call you to see if there's anything else they can do to help you out medically.     [facility]    If you have any questions regarding to your visit please contact your care team:     Team Pink:   Clinic Hours Telephone Number   Internal Medicine:  Dr. Zainab Geller, NP       7am-7pm  Monday - Thursday   7am-5pm  Fridays  [phone]  (Appointment scheduling available 24/7)    Questions about your visit?  Team Line  [phone]   Urgent Care - [place] and [place] - 11am-9pm Monday-Friday Saturday-Sunday- 9am-5pm   [place] - 5pm-9pm Monday-Friday Saturday-Sunday- 9am-5pm  [phone] - [place]   [phone] - [place]       What options do I have for visits at the clinic other than the traditional office visit?  To expand how we care for you, many of our providers are utilizing electronic visits (e-visits) and telephone visits, when medically appropriate, for interactions with their patients rather than a visit in the clinic.   We also offer nurse visits for many medical concerns. Just like any other service, we will bill your insurance company for this type of visit based on time spent on the phone with your provider. Not all insurance companies cover these visits. Please check with your medical insurance if this type of visit is covered. You will be responsible for any charges that are not paid by your insurance.       E-visits via [employer]:  generally incur a $35.00 fee.  Telephone visits:  Time spent on the phone: *charged based on time that is spent on the phone in increments of 10 minutes. Estimated cost:   5-10 mins $30.00   11-20 mins. $59.00   21-30 mins. $85.00   Use [employer] (secure email communication and access to your chart) to send your primary care provider a message or make an appointment. Ask someone on your Team how to sign up for [employer].    For a Price Quote for your services, please call our Swank Line at [phone].    As always, Thank you for trusting us with your health care needs!    Discharged by Lacy HARDY CMA ([facility])          Follow-ups after your visit        Additional Services     CARE COORDINATION REFERRAL       Services are provided by a Care Coordinator for people with complex needs such as: medical, social, or financial troubles.  The Care Coordinator works with the patient and their Primary Care Provider to determine health goals, obtain resources, achieve outcomes, and develop care plans that help coordinate the patient's care.     Reason for Referral: Other Financial Concerns    Provide additional details for Care Coordination to best meet the patient's current needs: Please discuss with Dr. Aguilar    Clinical Staff have discussed the Care Coordination Referral with the patient and/or caregiver: yes                  Your next 10 appointments already scheduled     May 06, 2017 10:15 AM CDT   MA SCREENING DIGITAL BILATERAL with [id number]   [facility] ([facility])    [address] [place] [id number] [phone]           Do not use any powder, lotion or deodorant under your arms or on your breast. If you do, we will ask you to remove it before your exam.  Wear comfortable, two-piece clothing.  If you have any allergies, tell your care team.  Bring any previous mammograms from other facilities or have them mailed to the breast  center.            May 23, 2017  1:30 PM CDT   (Arrive by 1:15 PM)   Return Visit with Rod Purcell MD   [facility] ([facility])    [address]  3rd Floor  [facility] [id number] [phone]            Jun 08, 2017  1:00 PM CDT   (Arrive by 12:45 PM)   MR BRAIN W/O & W CONTRAST with UC1   [facility] MRI ([facility])    [address]  [address] [facility] [id number] [phone]           Take your medicines as usual, unless your doctor tells you not to. Bring a list of your current medicines to your exam (including vitamins, minerals and over-the-counter drugs).  You will be given intravenous contrast for this exam. To prepare:   The day before your exam, drink extra fluids at least six 8-ounce glasses (unless your doctor tells you to restrict your fluids).   Have a blood test (creatinine test) within 30 days of your exam. Go to your clinic or Diagnostic Imaging Department for this test.  The MRI machine uses a strong magnet. Please wear clothes without metal (snaps, zippers). A sweatsuit works well, or we may give you a hospital gown.  Please remove any body piercings and hair extensions before you arrive. You will also remove watches, jewelry, hairpins, wallets, dentures, partial dental plates and hearing aids. You may wear contact lenses, and you may be able to wear your rings. We have a safe place to keep your personal items, but it is safer to leave them at home.   **IMPORTANT** THE INSTRUCTIONS BELOW ARE ONLY FOR THOSE PATIENTS WHO HAVE BEEN TOLD THEY WILL RECEIVE SEDATION OR GENERAL ANESTHESIA DURING THEIR MRI PROCEDURE:  IF YOU WILL RECEIVE SEDATION (take medicine to help you relax during your exam):   You must get the medicine from your doctor before you arrive. Bring the medicine to the exam. Do not take it at home.   Arrive one hour early. Bring someone who can take you home after the test. Your  medicine will make you sleepy. After the exam, you may not drive, take a bus or take a taxi by yourself.   No eating 8 hours before your exam. You may have clear liquids up until 4 hours before your exam. (Clear liquids include water, clear tea, black coffee and fruit juice without pulp.)  IF YOU WILL RECEIVE ANESTHESIA (be asleep for your exam):   Arrive 1 1/2 hours early. Bring someone who can take you home after the test. You may not drive, take a bus or take a taxi by yourself.   No eating 8 hours before your exam. You may have clear liquids up until 4 hours before your exam. (Clear liquids include water, clear tea, black coffee and fruit juice without pulp.)  Please call the Imaging Department at your exam site with any questions.            Jun 08, 2017  1:45 PM CDT   (Arrive by 1:30 PM)   MR CERVICAL SPINE W/O & W CONTRAST with [address] MRI ([facility])    [address] [address] [phone]           Take your medicines as usual, unless your doctor tells you not to. Bring a list of your current medicines to your exam (including vitamins, minerals and over-the-counter drugs).  You will be given intravenous contrast for this exam. To prepare:   The day before your exam, drink extra fluids at least six 8-ounce glasses (unless your doctor tells you to restrict your fluids).   Have a blood test (creatinine test) within 30 days of your exam. Go to your clinic or Diagnostic Imaging Department for this test.  The MRI machine uses a strong magnet. Please wear clothes without metal (snaps, zippers). A sweatsuit works well, or we may give you a hospital gown.  Please remove any body piercings and hair extensions before you arrive. You will also remove watches, jewelry, hairpins, wallets, dentures, partial dental plates and hearing aids. You may wear contact lenses, and you may be able to wear your rings. We have a safe place to keep  your personal items, but it is safer to leave them at home.   **IMPORTANT** THE INSTRUCTIONS BELOW ARE ONLY FOR THOSE PATIENTS WHO HAVE BEEN TOLD THEY WILL RECEIVE SEDATION OR GENERAL ANESTHESIA DURING THEIR MRI PROCEDURE:  IF YOU WILL RECEIVE SEDATION (take medicine to help you relax during your exam):   You must get the medicine from your doctor before you arrive. Bring the medicine to the exam. Do not take it at home.   Arrive one hour early. Bring someone who can take you home after the test. Your medicine will make you sleepy. After the exam, you may not drive, take a bus or take a taxi by yourself.   No eating 8 hours before your exam. You may have clear liquids up until 4 hours before your exam. (Clear liquids include water, clear tea, black coffee and fruit juice without pulp.)  IF YOU WILL RECEIVE ANESTHESIA (be asleep for your exam):   Arrive 1 1/2 hours early. Bring someone who can take you home after the test. You may not drive, take a bus or take a taxi by yourself.   No eating 8 hours before your exam. You may have clear liquids up until 4 hours before your exam. (Clear liquids include water, clear tea, black coffee and fruit juice without pulp.)  Please call the Imaging Department at your exam site with any questions.            Jun 08, 2017  2:30 PM CDT   (Arrive by 2:15 PM)   Return Multiple Sclerosis with Carlos Hunt MD   [facility] ([facility])    [address] [place] [address] [phone]              Who to contact     If you have questions or need follow up information about today's clinic visit or your schedule please contact [facility] directly at [phone].  Normal or non-critical lab and imaging results will be communicated to you by MyChart, letter or phone within 4 business days after the clinic has received the results. If you do not hear from us within 7 days, please contact the clinic  through [employer] or phone. If you have a critical or abnormal lab result, we will notify you by phone as soon as possible.  Submit refill requests through [employer] or call your pharmacy and they will forward the refill request to us. Please allow 3 business days for your refill to be completed.          Additional Information About Your Visit        [email or web address] Information     [employer] gives you secure access to your electronic health record. If you see a primary care provider, you can also send messages to your care team and make appointments. If you have questions, please call your primary care clinic.  If you do not have a primary care provider, please call [phone] and they will assist you.        Care EveryWhere ID     This is your Care EveryWhere ID. This could be used by other organizations to access your [place] medical records  [id number]        Your Vitals Were     Pulse Temperature Last Period Pulse Oximetry Breastfeeding? BMI (Body Mass Index)    95 96.4  F (35.8  C) (Oral) 03/20/2017 (Exact Date) 96% No 36.55 kg/m2       Blood Pressure from Last 3 Encounters:   04/26/17 122/84   04/18/17 143/90   03/24/17 122/78    Weight from Last 3 Encounters:   04/26/17 238 lb 9.6 oz (108.2 kg)   04/18/17 241 lb 3.2 oz (109.4 kg)   03/24/17 243 lb 6.4 oz (110.4 kg)              We Performed the Following     CARE COORDINATION REFERRAL        Primary Care Provider Office Phone # Fax #    Zainab Aguilar [phone]       [address] [address]        Thank you!     Thank you for choosing [facility]  for your care. Our goal is always to provide you with excellent care. Hearing back from our patients is one way we can continue to improve our services. Please take a few minutes to complete the written survey that you may receive in the mail after your visit with us. Thank you!             Your Updated Medication List - Protect others around you:  Learn how to safely use, store and throw away your medicines at www.disposemymeds.org.          This list is accurate as of: 4/26/17 10:24 AM.  Always use your most recent med list.                   Brand Name Dispense Instructions for use    ACE NOT PRESCRIBED (INTENTIONAL)     0 each    ACE Inhibitor not prescribed due to Other:no microalbuminuria       aspirin 81 MG tablet     30 tablet    Take by mouth daily       atorvastatin 40 MG tablet    LIPITOR    90 tablet    Take 1 tablet (40 mg) by mouth daily       AUBAGIO 14 MG tablet   Generic drug:  teriflunomide      Take by mouth daily Only available through select specialty pharmacies       blood glucose calibration solution     1 each    Use to calibrate blood glucose monitor as directed.       blood glucose monitoring lancets     3 Box    Use to test blood sugars 3 times daily or as directed.       blood glucose monitoring meter device kit    no brand specified    1 kit    Use to test blood sugar 1 times daily or as directed       blood glucose monitoring test strip    ONE TOUCH ULTRA    300 strip    Use to test blood sugars 3 times daily or as directed.       diltiazem 120 MG 24 hr capsule    CARDIZEM CD    30 capsule    Take 1 capsule (120 mg) by mouth daily       ferrous fumarate 65 mg ([place]. FE)-Vitamin C 125 mg  MG Tabs tablet    VITRON C    90 tablet    Take 1 tablet by mouth daily       insulin aspart 100 UNIT/ML injection    NovoLOG FLEXPEN    3 Month    Inject 15 Units Subcutaneous 3 times daily (with meals)       insulin degludec 100 UNIT/ML pen    TRESIBA    36 mL    Inject 36 units once daily       * insulin pen needle 32G X 4 MM    BD SAMANTHA U/F    100 each    Use once daily or as directed.       * insulin pen needle 29G X 12.7MM    BD ULTRA-FINE    100 each    Use once daily with victoza pen       * insulin pen needle 31G X 6 MM     300 each    Use 4 daily or as directed.       liraglutide 18 MG/3ML soln    VICTOZA    9 mL    Start with 0.6  MG daily for 1 week, then 1.2 MG daily for a week, and then 1.8 MG daily       metFORMIN 500 MG 24 hr tablet    GLUCOPHAGE-XR    120 tablet    Take 2 tablets (1,000 mg) by mouth 2 times daily (with meals)       metroNIDAZOLE 500 MG tablet    FLAGYL    14 tablet    Take 1 tablet (500 mg) by mouth 2 times daily       omeprazole 40 MG capsule    priLOSEC    180 capsule    TAKE ONE CAPSULE BY MOUTH TWICE DAILY       SENNA-PLUS PO      Take by mouth 2 times daily 2 tabs       TEGRETOL PO      Take 50 mg by mouth       Urea 20 % Crea cream     120 g    To dry and scaly feet twice a day as needed.       venlafaxine 150 MG 24 hr capsule    EFFEXOR-XR    90 capsule    Take 1 capsule (150 mg) by mouth daily       vitamin D 2000 UNITS tablet     180 tablet    Take 4,000 Units by mouth daily       * Notice:  This list has 3 medication(s) that are the same as other medications prescribed for you. Read the directions carefully, and ask your doctor or other care provider to review them with you.

## 2017-04-26 NOTE — PROGRESS NOTES
Clinic Care Coordination Contact  OUTREACH    Referral Information:  Referral Source: PCP  Reason for Contact: Medication and financial concerns  Care Conference: No     Universal Utilization:   ED Visits in last year: 1  Hospital visits in last year: 1  Last PCP appointment: 04/26/17  Missed Appointments: 0  Concerns: Financial  Multiple Providers or Specialists: Yes ([facility])    Clinical Concerns:  Current Medical Concerns:  Pt with a primary medical history significant for multiple sclerosis and type II DM. Pt Neurologist is Dr. Emily Acosta with the [facility].  Current Behavioral Concerns: History of depression  Education Provided to patient: CC role's, community resources   Clinical Pathway Name: None    Medication Management:  Pt confirmed that she has at least another months worth of medications however is concerned about her insulin and acthar gel that require refrigeration.   Pt electric was turned off.   Her insulin is able to go un refrigerated for 30 days however the acthar gel cannot.  Pt utilizes the acthar gel when she is having a MS flare.      Functional Status:  Mobility Status: Independent  Equipment Currently Used at Home: none  Transportation: Self      Psychosocial:  Current living arrangement:: Patient lives with her 21 year old daughter, 17 year old son,  16 year old daughter (currently in a MH treatment program), 11 year old daughter and a 2 year old granddaughter.      Financial/Insurance: Pt states that her electricity was turned off last week d/t unpaid bill.  Patient states her bill is over $13,000.   Pt sought out support through - emergency assistance, Kat Harmon, energy assistance program, and [employer].   Pt had already utilized the emergency assistance program when she moved last November so was ineligible for any additional funding.  Kat Harmon's program does not offer the financial assistance this patient required.  Pt  "contacted [employer] and was told that the they no longer had emergency funding available.     Patient is working full time at [employer] and is able to afford her rent, groceries and was paying her utility bill up until it was shut off.   Patient medical insurance coverage through [employer] will end April 31st as pt is over income and no longer qualifies for the program.  The patient was told by her employer that she would not be eligible for insurance through her employer.   STAN and RN reviewed that as long as pt has a letter from [employer] confirming that her coverage will be dropped that this would actually be a covered event and pt should re approach her HR department to review her situation.       Resources and Interventions:  Kusum MACARIO joined discussion with patient.  Call placed to [employer] to discuss patient situation.   Initially spoke with Chika in Personal Accounts Department regarding pt account status.  Pt current balance is $13,909.34.   Chika transferred us to Eleazar to address financial options.  Per Eleazar the patient maybe eligible for the \"Power on Program\".  Patient needs to complete the application and it could take up to 3 weeks approve assistance.   Reviewed energy assistance program.  Patient qualified for assistance during the [place] Cold Weather Months but that program is no longer available.  Reviewed medical necessity in needing electricity.   Medications that require refrigeration is not covered under \"medical necessity\".   After further discussion it was confirmed that pt 11 year old daughter might be eligible as she requires neb treatments.   Eleazar faxed the required form to [facility] as patients daughters pulmonologist is Dr. Grimes.    Pt also had to agree to a payment plan but it allows 11 months for patient to repay there outstanding balance.  Patients first payment would be due May 28th in the amount of $1315 however if the patient is " "approved for the \"Power on Program\" this amount would be changed to a more manageable amount.   But if the patient is not eligible for the power on program and she was unable to pay the amount due the patient would be at risk for having her electricity turned off again.  While in clinic patient completed the \"Power On\" application which was faxed and is being mailed as well.  Patient was given a copy of the application. Pt also made a $50 payment toward her bill confirmation # [id number] as this was required in order for the patient to have her electricity turned back on.  Per Eleazar patient electricity will either be turned back on by 5 p.m. Today or 5 p.m. Tomorrow.    Spoke with G[facility] regarding pt situation.  Gt confirmed that she will watch for the fax and f/u with the patient.  She also confirmed that pt daughter does have an appointment for tomorrow.  Pt plans to f/u with the clinic tomorrow and agreed to update SW on the outcome of the form being faxed/completed.     Spoke with Mayelin PITTMAN with the [facility] regarding pt situation.   Per Mayelin the clinic has samples of the Acthar gel for the patient and can give them to her next week at her clinic appt on the 4th.   Mayelin also confirmed that the patient should come to her appointment regarding of her insurance status as they will work with the patient.    Patient signed Authorization to Discuss and Release of information forms during this visit allowing connect with the [facility] of Neurology.         Advanced Care Plans/Directives on file:: No        Goals:   Goal 1 Statement: I will contact my human resources department to discuss applying for medical insurance coverage.  Goal 1 Progression Percent: 10%  Goal 1 Progression Date: 04/26/17    Patient/Caregiver understanding: on going support and reinforcement to follow through on plan of care, financial resources.   Frequency of Care Coordination: " TBD  Upcoming appointment: 12/02/16     Plan:   Patient will f/u with her human resources department this week to address insurance options.  Patient will f/u with Neurology as previously planned.  Patient will continue to follow plan of care as directed by her PCP and Neurology.  CC SW plans to f/u with the patient next week regarding her insurance status.     CC RN will assist/support CC SW and patient as needed.    Rachel Espinoza RN BSN, PHN RN Care Coordinator  [facility]  [email or web address]  [phone]  4/26/2017 3:40 PM

## 2017-05-02 NOTE — PROGRESS NOTES
Clinic Care Coordination Contact  [facility]/Voicemail    Referral Source: PCP  Clinical Data: Care Coordinator Outreach  Outreach attempted x 1.  Left message on voicemail with call back information and requested return call.  Plan: Care Coordinator mailed out care coordination introduction letter on 12/1/16. Care Coordinator RN will try to reach patient again in 3-5 business days.    MEGAN Childs  Care Coordination  [place] [facility] [place]  [phone]  [email or web address]

## 2017-05-03 ENCOUNTER — TELEPHONE (OUTPATIENT)
Dept: FAMILY MEDICINE | Facility: CLINIC | Age: 47
End: 2017-05-03

## 2017-05-03 NOTE — TELEPHONE ENCOUNTER
Called and spoke with Chloe from the [facility] pharmacy and medication is for Tresiba long acting insulin. Gave number and information for coupons, pharmacists is trying to contact manufacture for anything else that we can get for coupons or discounts. Anything else we can do further for this. Please advise.       Lacy HARDY CMA ([facility])

## 2017-05-03 NOTE — TELEPHONE ENCOUNTER
Clinic Care Coordination Contact  Care Team Conversations    Spoke with Chloe, Pharmacy Assistant with [facility] regarding patient situation.       -Patient last filled her prescription on 4/2/17.  She    Should have enough supply to cover her for 42    Days - so she should have enough to cover    Through May 14th.     -Out of pocket cost is $500 for 5 pens.         Patient is in between insurance: [place] coverage ended May 1st.  As of last week patient was applying for insurance through her employer.  Employer coverage should be retro active to May 1st however I do not know how long it will take for her employer to process the paperwork etc.       We could look at the [employer] Patient Assistance program for support however it could take 10-14 days before we would receive confirmation of approval or denial for the program.   Program would provide medication to the patient for free.      I left a VM for the patient to call me.   CDE has samples that we can give to the patient to cover her now if needed.      Will update team once I hear back from the patient.       Rachel Espinoza RN BSN, PHN RN Care Coordinator  [facility]  [email or web address]  [phone]  5/3/2017 2:16 PM

## 2017-05-03 NOTE — TELEPHONE ENCOUNTER
Please get more information.  Which insulin are they referring to?  Are they needing a refill too or just asking for coupons?    Patient can check for coupons/savings card on the med's websites or contact  patient drug assistance at [phone]    Nguyễn Howe RN

## 2017-05-03 NOTE — TELEPHONE ENCOUNTER
Clinic Care Coordination Contact  Care Team Conversations    Received return call from patient.   Pt confirmed that she has enough medication to get her through the next 10-14 days.   Pt states she was told her insurance through her employer should be active by the end of this week.    Reviewed that Juan Carlos has a prescription assistance card for patient to assist with medication co-pay as pt will have a high deductible health plan with higher prescription co-pays.       Pt has CC SW and CC RN contact information.  Pt agreed to update CC on insurance status.    Reviewed pt situation with CC STAN.   Will f/u with patient next week.      Rachel Espinoza, RN BSN, PHN RN Care Coordinator  [facility]  [email or web address]  [phone]  5/3/2017 3:44 PM

## 2017-05-03 NOTE — TELEPHONE ENCOUNTER
Reason for Call:  Other prescription    Detailed comments: [facility] pharmacy states the patient is in between insurance coverage at this moment, Patient is out of her insulin [facility] pharmacy would like to  discuss if any coupons are available for that prescription, Pharmacy requesting assistance,     Phone Number Patient can be reached at: Other phone number:  [phone]  Best Time: today    Can we leave a detailed message on this number? NO    Call taken on 5/3/2017 at 11:09 AM by Noemi Mcallister

## 2017-05-23 ENCOUNTER — RADIANT APPOINTMENT (OUTPATIENT)
Dept: MAMMOGRAPHY | Facility: CLINIC | Age: 47
End: 2017-05-23
Attending: INTERNAL MEDICINE
Payer: COMMERCIAL

## 2017-05-23 ENCOUNTER — OFFICE VISIT (OUTPATIENT)
Dept: DERMATOLOGY | Facility: CLINIC | Age: 47
End: 2017-05-23

## 2017-05-23 DIAGNOSIS — D23.9 DERMATOFIBROMA: ICD-10-CM

## 2017-05-23 DIAGNOSIS — L81.4 SOLAR LENTIGO: ICD-10-CM

## 2017-05-23 DIAGNOSIS — L82.1 SK (SEBORRHEIC KERATOSIS): Primary | ICD-10-CM

## 2017-05-23 DIAGNOSIS — Z12.83 SCREENING EXAM FOR SKIN CANCER: ICD-10-CM

## 2017-05-23 DIAGNOSIS — D22.9 MULTIPLE BENIGN MELANOCYTIC NEVI: ICD-10-CM

## 2017-05-23 DIAGNOSIS — Z12.31 VISIT FOR SCREENING MAMMOGRAM: ICD-10-CM

## 2017-05-23 PROCEDURE — G0202 SCR MAMMO BI INCL CAD: HCPCS | Performed by: RADIOLOGY

## 2017-05-23 PROCEDURE — 77063 BREAST TOMOSYNTHESIS BI: CPT | Performed by: RADIOLOGY

## 2017-05-23 ASSESSMENT — PAIN SCALES - GENERAL: PAINLEVEL: NO PAIN (0)

## 2017-05-23 NOTE — NURSING NOTE
Dermatology Rooming Note    Maranda S Sergeyhamzah's goals for this visit include:   Chief Complaint   Patient presents with     Skin Check     Mole check. Maranda has a few spots of concern today. No personal or family hx of skin cancer.     Lo Clark, CMA

## 2017-05-23 NOTE — MR AVS SNAPSHOT
After Visit Summary   5/23/2017    Maranda Mallory    MRN: [id number]           Patient Information     Date Of Birth          1970        Visit Information        Provider Department      5/23/2017 1:30 PM Rod Purcell MD [facility] Dermatology        Today's Diagnoses     SK (seborrheic keratosis)    -  1    Dermatofibroma        Multiple benign melanocytic nevi        Solar lentigo        Screening exam for skin cancer           Follow-ups after your visit        Follow-up notes from your care team     Return in about 2 years (around 5/23/2019).      Who to contact     Please call your clinic at [phone] to:    Ask questions about your health    Make or cancel appointments    Discuss your medicines    Learn about your test results    Speak to your doctor   If you have compliments or concerns about an experience at your clinic, or if you wish to file a complaint, please contact [facility] Patient Relations at [phone] or email us at [email or web address]         Additional Information About Your Visit        MyChart Information     [employer] gives you secure access to your electronic health record. If you see a primary care provider, you can also send messages to your care team and make appointments. If you have questions, please call your primary care clinic.  If you do not have a primary care provider, please call [phone] and they will assist you.      [employer] is an electronic gateway that provides easy, online access to your medical records. With [employer], you can request a clinic appointment, read your test results, renew a prescription or communicate with your care team.     To access your existing account, please contact your [facility] or call [phone] for assistance.        Care EveryWhere ID     This is your Care EveryWhere ID. This could be used by other organizations to access your [facility]  records  [id number]         Blood Pressure from Last 3 Encounters:   No data found for BP    Weight from Last 3 Encounters:   No data found for Wt              Today, you had the following     No orders found for display         Today's Medication Changes          These changes are accurate as of: 5/23/17 11:59 PM.  If you have any questions, ask your nurse or doctor.               Stop taking these medicines if you haven't already. Please contact your care team if you have questions.     ACE NOT PRESCRIBED (INTENTIONAL)   Stopped by:  Rod Purcell MD                    Primary Care Provider Office Phone # Fax #    Zainab Olga Aguilar [phone]       [place] [id number] [facility] [address]        Thank you!     Thank you for choosing [facility]  for your care. Our goal is always to provide you with excellent care. Hearing back from our patients is one way we can continue to improve our services. Please take a few minutes to complete the written survey that you may receive in the mail after your visit with us. Thank you!             Your Updated Medication List - Protect others around you: Learn how to safely use, store and throw away your medicines at www.disposemymeds.org.          This list is accurate as of: 5/23/17 11:59 PM.  Always use your most recent med list.                   Brand Name Dispense Instructions for use    aspirin 81 MG tablet     30 tablet    Take by mouth daily       atorvastatin 40 MG tablet    LIPITOR    90 tablet    Take 1 tablet (40 mg) by mouth daily       AUBAGIO 14 MG tablet   Generic drug:  teriflunomide      Take by mouth daily Only available through select specialty pharmacies       blood glucose calibration solution     1 each    Use to calibrate blood glucose monitor as directed.       blood glucose monitoring lancets     3 Box    Use to test blood sugars 3 times daily or as directed.       blood glucose monitoring meter  device kit    no brand specified    1 kit    Use to test blood sugar 1 times daily or as directed       blood glucose monitoring test strip    ONE TOUCH ULTRA    300 strip    Use to test blood sugars 3 times daily or as directed.       diltiazem 120 MG 24 hr capsule    CARDIZEM CD    30 capsule    Take 1 capsule (120 mg) by mouth daily       ferrous fumarate 65 mg ([place]. FE)-Vitamin C 125 mg  MG Tabs tablet    VITRON C    90 tablet    Take 1 tablet by mouth daily       insulin degludec 100 UNIT/ML pen    TRESIBA    36 mL    Inject 36 units once daily       * insulin pen needle 32G X 4 MM    BD SAMANTHA U/F    100 each    Use once daily or as directed.       * insulin pen needle 29G X 12.7MM    BD ULTRA-FINE    100 each    Use once daily with victoza pen       * insulin pen needle 31G X 6 MM     300 each    Use 4 daily or as directed.       liraglutide 18 MG/3ML soln    VICTOZA    9 mL    Start with 0.6 MG daily for 1 week, then 1.2 MG daily for a week, and then 1.8 MG daily       metFORMIN 500 MG 24 hr tablet    GLUCOPHAGE-XR    120 tablet    Take 2 tablets (1,000 mg) by mouth 2 times daily (with meals)       metroNIDAZOLE 500 MG tablet    FLAGYL    14 tablet    Take 1 tablet (500 mg) by mouth 2 times daily       omeprazole 40 MG capsule    priLOSEC    180 capsule    TAKE ONE CAPSULE BY MOUTH TWICE DAILY       SENNA-PLUS PO      Take by mouth 2 times daily 2 tabs       TEGRETOL PO      Take 50 mg by mouth       Urea 20 % Crea cream     120 g    To dry and scaly feet twice a day as needed.       venlafaxine 150 MG 24 hr capsule    EFFEXOR-XR    90 capsule    Take 1 capsule (150 mg) by mouth daily       vitamin D 2000 UNITS tablet     180 tablet    Take 4,000 Units by mouth daily       * Notice:  This list has 3 medication(s) that are the same as other medications prescribed for you. Read the directions carefully, and ask your doctor or other care provider to review them with you.

## 2017-05-23 NOTE — PROGRESS NOTES
[facility] Dermatology Note    Dermatology Problem List:  1. Irritated acrochordons s/p cryo  2. SK, nevi, solar lentigines    Encounter Date: May 23, 2017    CC:   Chief Complaint   Patient presents with     Skin Check     Mole check. Maranda has a few spots of concern today. No personal or family hx of skin cancer.     History of Present Illness:  This 46 year old female presents for a skin check. Today the patient reports that she has a few spots of concern. She reports that she has a new spot on her right calf and a lesion on her right breast. She notes that she was recently diagnosed with MS within the last 6 months, so would like some reassurance on her skin. She also reports that she is a little sunburned.The patient reports no other lesions of concern at this time.     Otherwise, the patient reports no painful, bleeding, nonhealing, or pruritic lesions, and denies new or changing moles. No oral sores, unexpected weight loss.    Past Medical History:   Patient Active Problem List   Diagnosis     Obesity     Hyperlipidemia LDL goal <100     Cholelithiasis without obstruction     Non-alcoholic fatty liver disease     GERD (gastroesophageal reflux disease)     Hypertriglyceridemia     Esophageal dysmotility     Sliding hiatal hernia     Heart murmur     Recurrent UTI     Hypovitaminosis D     Health Care Home (Not Active)      Lumbar radiculopathy     KAITLIN (generalized anxiety disorder)     Type 2 diabetes mellitus without complication (H)     Paroxysmal supraventricular tachycardia (H)     Gastroesophageal reflux disease, esophagitis presence not specified     Atypical chest pain     HSIL (high grade squamous intraepithelial lesion) on Pap smear of cervix     Morbid obesity due to excess calories (H)     Pain of left upper extremity     Other abnormal cytological finding of specimen from cervix     Papanicolaou smear of cervix with low grade squamous intraepithelial lesion (LGSIL)     Physical  deconditioning     SOB (shortness of breath)     Insomnia due to other mental disorder     Anxiety     Persistent disorder of initiating or maintaining sleep     Chronic daily headache     Excessive daytime sleepiness     MS (multiple sclerosis) (H)     Urinary frequency     Hypomagnesemia     Hypokalemia     Generalized hyperhidrosis     Elevation of level of transaminase or lactic acid dehydrogenase (LDH)     Anemia due to other cause     Type 2 diabetes mellitus with hyperosmolarity without coma, with long-term current use of insulin (H)     Other iron deficiency anemia     BMI 37.0-37.9, adult     Past Medical History:   Diagnosis Date     Acne      Allergic rhinitis 5/19/2009    Skin Tests- Cat/Dog/DM/T/G/W     Allergies      Anxiety      Bacterial vaginosis 9/13/2011    Chronic, sometimes with yeast  3/20/12: + BV and + yeast--treated with Metronidazole X 10 days and Diflucan X 3 doses to be taken afterward.  JMS      Depressive disorder, not elsewhere classified 1995    Sees a therapist but no meds     Esophageal dysmotility 1/29/2014    [facility] to [facility] - will get food stuck in throat and will need EGD to remove it     Gestational diabetes      H/O colposcopy with cervical biopsy 3/7/16    LSIL, can't exclude HSIL     Heart murmur 1/29/2014     High risk HPV infection 2/2016    LSIL +HPV 16     Hypertension     borderline     Mixed hyperlipidemia     Takes Lipitor     Sliding hiatal hernia 1/29/2014     Type 2 diabetes mellitus (H)     diet controlled     Type II or unspecified type diabetes mellitus without mention of complication, not stated as uncontrolled      Urinary tract infection, site not specified     Recurrent UTI's couple times a year     Vitamin D deficiency disease 5/23/2013     Past Surgical History:   Procedure Laterality Date     DILATE ESOPHAGUS  3/20/2013    Procedure: DILATE ESOPHAGUS;;  Surgeon: Anthony Watts MD;  Location:  GI     ESOPHAGEAL MOTILITY STUDY       ESOPHAGOSCOPY,  GASTROSCOPY, DUODENOSCOPY (EGD), COMBINED  3/20/2013    Procedure: COMBINED ESOPHAGOSCOPY, GASTROSCOPY, DUODENOSCOPY (EGD), BIOPSY SINGLE OR MULTIPLE;;  Surgeon: Anthony Watts MD;  [facility]: [facility]     HC DILATION/CURETTAGE DIAG/THER NON OB      D & C x5 with pregnancies     HC ESOPHAGOSCOPY WITH FOREIGN BODY REMOVAL       LEEP TX, CERVICAL  4/12/16    Negative     TUBAL LIGATION       Social History:  The patient is a mother and grandmother.    Family History:  Not obtained at this visit.    Medications:  Current Outpatient Prescriptions   Medication Sig Dispense Refill     metroNIDAZOLE (FLAGYL) 500 MG tablet Take 1 tablet (500 mg) by mouth 2 times daily 14 tablet 0     Urea 20 % CREA To dry and scaly feet twice a day as needed. 120 g 11     ferrous fumarate 65 mg, [place]. FE,-Vitamin C 125 mg (VITRON C)  MG TABS tablet Take 1 tablet by mouth daily 90 tablet 1     liraglutide (VICTOZA) 18 MG/3ML soln Start with 0.6 MG daily for 1 week, then 1.2 MG daily for a week, and then 1.8 MG daily 9 mL 3     venlafaxine (EFFEXOR-XR) 150 MG 24 hr capsule Take 1 capsule (150 mg) by mouth daily 90 capsule 1     insulin degludec (TRESIBA) 100 UNIT/ML pen Inject 36 units once daily 36 mL 1     blood glucose monitoring (NO BRAND SPECIFIED) meter device kit Use to test blood sugar 1 times daily or as directed 1 kit 0     blood glucose monitoring (ONE TOUCH DELICA) lancets Use to test blood sugars 3 times daily or as directed. 3 Box 3     blood glucose monitoring (ONE TOUCH ULTRA) test strip Use to test blood sugars 3 times daily or as directed. 300 strip 3     atorvastatin (LIPITOR) 40 MG tablet Take 1 tablet (40 mg) by mouth daily 90 tablet 1     teriflunomide (AUBAGIO) 14 MG tablet Take by mouth daily Only available through select specialty pharmacies       CarBAMazepine (TEGRETOL PO) Take 50 mg by mouth       insulin aspart (NOVOLOG FLEXPEN) 100 UNIT/ML injection Inject 15 Units Subcutaneous 3 times daily (with meals) 3  Month 1     omeprazole (PRILOSEC) 40 MG capsule TAKE ONE CAPSULE BY MOUTH TWICE DAILY 180 capsule 3     Sennosides-Docusate Sodium (SENNA-PLUS PO) Take by mouth 2 times daily 2 tabs       insulin pen needle 31G X 6 MM Use 4 daily or as directed. 300 each 11     Cholecalciferol (VITAMIN D) 2000 UNITS tablet Take 4,000 Units by mouth daily 180 tablet 3     metFORMIN (GLUCOPHAGE-XR) 500 MG 24 hr tablet Take 2 tablets (1,000 mg) by mouth 2 times daily (with meals) 120 tablet 3     insulin pen needle (BD ULTRA-FINE) 29G X 12.7MM Use once daily with victoza pen 100 each 11     insulin pen needle (BD SAMANTHA U/F) 32G X 4 MM Use once daily or as directed. 100 each 3     diltiazem (CARDIZEM CD) 120 MG 24 hr CD capsule Take 1 capsule (120 mg) by mouth daily 30 capsule 1     Blood Glucose Calibration (FREESTYLE CONTROL SOLUTION) LIQD Use to calibrate blood glucose monitor as directed. 1 each 0     aspirin 81 MG tablet Take by mouth daily 30 tablet 3        Allergies   Allergen Reactions     Buspar [Buspirone] Other (See Comments)     sleepy     Codeine Nausea and Vomiting     have tolerated Vicodin     Eryc [Erythromycin] Nausea and Vomiting     Fluoxetine Other (See Comments)     anxious     Metformin Diarrhea     Morphine Nausea and Vomiting     Review of Systems:  -As per HPI    Physical exam:  There were no vitals taken for this visit.  GEN: This is a well developed, well-nourished female in no acute distress, in a pleasant mood.      SKIN: Total skin excluding the undergarment areas was performed. The exam included the head/face, neck, both arms, chest, back, abdomen, both legs, digits and/or nails.   - Right posterior calf: 4 x 6 mm pink to brown firm indurated papule which is tethered to overlying skin that dimples  - 3 mm compound nevus on the left temple  - Lower cheek with intradermal nevus  - There is a bright red dome shaped papule on the left lower cheek.   - Mild peeling from recent sunburn on right preauricular  area  - Stuck on tan to brown papules on the right breast, flanks  - Solar induced erythema on the upper back among solar lentigines, none concerning for skin cancer  - 4 mm slightly pedunculated brown papule with peripheral pigment on the right neck  - No other lesions of concern on areas examined.     Impression/Plan:  1. Dermatofibroma - right posterior calf  - No further intervention required. Patient to report changes.     2. Seborrheic keratoses - right breast, flanks  - Discussion of how these lesions form.   - No further intervention required. Patient to report changes.     3. Multiple clinically benign nevi - scattered on the trunk and extremities  - No further intervention required. Patient to report changes.     4. Solar lentigines  - No further intervention required. Patient to report changes.     Follow-up in 2 years, earlier for new or changing lesions.     Staff Involved:  Scribe Disclosure:   I, Abigail Collazo, am serving as a scribe to document services personally performed by Dr. Rod Purcell, based on data collection and the provider's statements to me.     Staff attestation:  The documentation recorded by the scribe accurately reflects the services I personally performed and the decisions I personally made.    Rod Purcell MD  Staff Dermatologist    Department of Dermatology

## 2017-05-23 NOTE — LETTER
5/23/2017     RE: Maranda Mallory  [address]   [place] [address]     Dear Colleague,    Thank you for referring your patient, Maranda Mallory, to the [facility] DERMATOLOGY at [facility]. Please see a copy of my visit note below.    [facility] Dermatology Note    Dermatology Problem List:  1. Irritated acrochordons s/p cryo  2. SK, nevi, solar lentigines    Encounter Date: May 23, 2017    CC:   Chief Complaint   Patient presents with     Skin Check     Mole check. Maranda has a few spots of concern today. No personal or family hx of skin cancer.     History of Present Illness:  This 46 year old female presents for a skin check. Today the patient reports that she has a few spots of concern. She reports that she has a new spot on her right calf and a lesion on her right breast. She notes that she was recently diagnosed with MS within the last 6 months, so would like some reassurance on her skin. She also reports that she is a little sunburned.The patient reports no other lesions of concern at this time.     Otherwise, the patient reports no painful, bleeding, nonhealing, or pruritic lesions, and denies new or changing moles.    Past Medical History:   Patient Active Problem List   Diagnosis     Obesity     Hyperlipidemia LDL goal <100     Cholelithiasis without obstruction     Non-alcoholic fatty liver disease     GERD (gastroesophageal reflux disease)     Hypertriglyceridemia     Esophageal dysmotility     Sliding hiatal hernia     Heart murmur     Recurrent UTI     Hypovitaminosis D     Health Care Home (Not Active)      Lumbar radiculopathy     KAITLIN (generalized anxiety disorder)     Type 2 diabetes mellitus without complication (H)     Paroxysmal supraventricular tachycardia (H)     Gastroesophageal reflux disease, esophagitis presence not specified     Atypical chest pain     HSIL (high grade squamous intraepithelial lesion) on Pap smear of cervix      Morbid obesity due to excess calories (H)     Pain of left upper extremity     Other abnormal cytological finding of specimen from cervix     Papanicolaou smear of cervix with low grade squamous intraepithelial lesion (LGSIL)     Physical deconditioning     SOB (shortness of breath)     Insomnia due to other mental disorder     Anxiety     Persistent disorder of initiating or maintaining sleep     Chronic daily headache     Excessive daytime sleepiness     MS (multiple sclerosis) (H)     Urinary frequency     Hypomagnesemia     Hypokalemia     Generalized hyperhidrosis     Elevation of level of transaminase or lactic acid dehydrogenase (LDH)     Anemia due to other cause     Type 2 diabetes mellitus with hyperosmolarity without coma, with long-term current use of insulin (H)     Other iron deficiency anemia     BMI 37.0-37.9, adult     Past Medical History:   Diagnosis Date     Acne      Allergic rhinitis 5/19/2009    Skin Tests- Cat/Dog/DM/T/G/W     Allergies      Anxiety      Bacterial vaginosis 9/13/2011    Chronic, sometimes with yeast  3/20/12: + BV and + yeast--treated with Metronidazole X 10 days and Diflucan X 3 doses to be taken afterward.  JMS      Depressive disorder, not elsewhere classified 1995    Sees a therapist but no meds     Esophageal dysmotility 1/29/2014    Goes to [facility] - will get food stuck in throat and will need EGD to remove it     Gestational diabetes      H/O colposcopy with cervical biopsy 3/7/16    LSIL, can't exclude HSIL     Heart murmur 1/29/2014     High risk HPV infection 2/2016    LSIL +HPV 16     Hypertension     borderline     Mixed hyperlipidemia     Takes Lipitor     Sliding hiatal hernia 1/29/2014     Type 2 diabetes mellitus (H)     diet controlled     Type II or unspecified type diabetes mellitus without mention of complication, not stated as uncontrolled      Urinary tract infection, site not specified     Recurrent UTI's couple times a year     Vitamin D  deficiency disease 5/23/2013     Past Surgical History:   Procedure Laterality Date     DILATE ESOPHAGUS  3/20/2013    Procedure: DILATE ESOPHAGUS;;  Surgeon: Anthony Watts MD;  Location:  GI     ESOPHAGEAL MOTILITY STUDY       ESOPHAGOSCOPY, GASTROSCOPY, DUODENOSCOPY (EGD), COMBINED  3/20/2013    Procedure: COMBINED ESOPHAGOSCOPY, GASTROSCOPY, DUODENOSCOPY (EGD), BIOPSY SINGLE OR MULTIPLE;;  Surgeon: Anthony Watts MD;  Location:  GI     HC DILATION/CURETTAGE DIAG/THER NON OB      D & C x5 with pregnancies     HC ESOPHAGOSCOPY WITH FOREIGN BODY REMOVAL       LEEP TX, CERVICAL  4/12/16    Negative     TUBAL LIGATION       Social History:  The patient is a mother and grandmother.    Family History:  Not obtained at this visit.    Medications:  Current Outpatient Prescriptions   Medication Sig Dispense Refill     metroNIDAZOLE (FLAGYL) 500 MG tablet Take 1 tablet (500 mg) by mouth 2 times daily 14 tablet 0     Urea 20 % CREA To dry and scaly feet twice a day as needed. 120 g 11     ferrous fumarate 65 mg, [place]. FE,-Vitamin C 125 mg (VITRON C)  MG TABS tablet Take 1 tablet by mouth daily 90 tablet 1     liraglutide (VICTOZA) 18 MG/3ML soln Start with 0.6 MG daily for 1 week, then 1.2 MG daily for a week, and then 1.8 MG daily 9 mL 3     venlafaxine (EFFEXOR-XR) 150 MG 24 hr capsule Take 1 capsule (150 mg) by mouth daily 90 capsule 1     insulin degludec (TRESIBA) 100 UNIT/ML pen Inject 36 units once daily 36 mL 1     blood glucose monitoring (NO BRAND SPECIFIED) meter device kit Use to test blood sugar 1 times daily or as directed 1 kit 0     blood glucose monitoring (ONE TOUCH DELICA) lancets Use to test blood sugars 3 times daily or as directed. 3 Box 3     blood glucose monitoring (ONE TOUCH ULTRA) test strip Use to test blood sugars 3 times daily or as directed. 300 strip 3     atorvastatin (LIPITOR) 40 MG tablet Take 1 tablet (40 mg) by mouth daily 90 tablet 1     teriflunomide (AUBAGIO) 14 MG tablet  Take by mouth daily Only available through select specialty pharmacies       CarBAMazepine (TEGRETOL PO) Take 50 mg by mouth       insulin aspart (NOVOLOG FLEXPEN) 100 UNIT/ML injection Inject 15 Units Subcutaneous 3 times daily (with meals) 3 Month 1     omeprazole (PRILOSEC) 40 MG capsule TAKE ONE CAPSULE BY MOUTH TWICE DAILY 180 capsule 3     Sennosides-Docusate Sodium (SENNA-PLUS PO) Take by mouth 2 times daily 2 tabs       insulin pen needle 31G X 6 MM Use 4 daily or as directed. 300 each 11     Cholecalciferol (VITAMIN D) 2000 UNITS tablet Take 4,000 Units by mouth daily 180 tablet 3     metFORMIN (GLUCOPHAGE-XR) 500 MG 24 hr tablet Take 2 tablets (1,000 mg) by mouth 2 times daily (with meals) 120 tablet 3     insulin pen needle (BD ULTRA-FINE) 29G X 12.7MM Use once daily with victoza pen 100 each 11     insulin pen needle (BD SAMANTHA U/F) 32G X 4 MM Use once daily or as directed. 100 each 3     diltiazem (CARDIZEM CD) 120 MG 24 hr CD capsule Take 1 capsule (120 mg) by mouth daily 30 capsule 1     Blood Glucose Calibration (FREESTYLE CONTROL SOLUTION) LIQD Use to calibrate blood glucose monitor as directed. 1 each 0     aspirin 81 MG tablet Take by mouth daily 30 tablet 3        Allergies   Allergen Reactions     Buspar [Buspirone] Other (See Comments)     sleepy     Codeine Nausea and Vomiting     have tolerated Vicodin     Eryc [Erythromycin] Nausea and Vomiting     Fluoxetine Other (See Comments)     anxious     Metformin Diarrhea     Morphine Nausea and Vomiting     Review of Systems:  -As per HPI    Physical exam:  There were no vitals taken for this visit.  GEN: This is a well developed, well-nourished female in no acute distress, in a pleasant mood.      SKIN: Total skin excluding the undergarment areas was performed. The exam included the head/face, neck, both arms, chest, back, abdomen, both legs, digits and/or nails.   - Right posterior calf: 4 x 6 mm pink to brown firm indurated papule which is  tethered to overlying skin that dimples  - 3 mm compound nevus on the left temple  - Lower cheek with intradermal nevus  - There is a bright red dome shaped papule on the left lower cheek.   - Mild peeling from recent sunburn on right preauricular area  - Stuck on tan to brown papules on the right breast, flanks  - Solar induced erythema on the upper back among solar lentigines, none concerning for skin cancer  - 4 mm slightly pedunculated brown papule with peripheral pigment on the right neck  - No other lesions of concern on areas examined.     Impression/Plan:  1. Dermatofibroma - right posterior calf  - No further intervention required. Patient to report changes.     2. Seborrheic keratoses - right breast, flanks  - Discussion of how these lesions form.   - No further intervention required. Patient to report changes.     3. Multiple clinically benign nevi - scattered on the trunk and extremities  - No further intervention required. Patient to report changes.     4. Solar lentigines  - No further intervention required. Patient to report changes.     Follow-up in 2 years, earlier for new or changing lesions.     Staff Involved:  Scribe Disclosure:   I, Abigail Collazo, am serving as a scribe to document services personally performed by Dr. Rod Purcell, based on data collection and the provider's statements to me.     Again, thank you for allowing me to participate in the care of your patient.      Sincerely,    Rdo Purcell MD

## 2017-05-27 DIAGNOSIS — E11.9 TYPE 2 DIABETES MELLITUS WITHOUT COMPLICATION (H): Primary | ICD-10-CM

## 2017-05-30 RX ORDER — INSULIN ASPART 100 [IU]/ML
INJECTION, SOLUTION INTRAVENOUS; SUBCUTANEOUS
Qty: 45 ML | Refills: 0 | Status: SHIPPED | OUTPATIENT
Start: 2017-05-30 | End: 2017-06-08

## 2017-05-30 NOTE — TELEPHONE ENCOUNTER
insulin aspart (NOVOLOG FLEXPEN) 100 UNIT/ML injection        Last Written Prescription Date: 11/30/16  Last Fill Quantity: 3 month, # refills: 1  Last Office Visit with G, GI or [facility] prescribing provider:  04/26/17        BP Readings from Last 3 Encounters:   04/26/17 122/84   04/18/17 143/90   03/24/17 122/78     Lab Results   Component Value Date    MICROL 71 09/28/2016     Lab Results   Component Value Date    UMALCR 58.51 09/28/2016     Creatinine   Date Value Ref Range Status   03/24/2017 0.57 0.52 - 1.04 mg/dL Final   ]  GFR Estimate   Date Value Ref Range Status   03/24/2017 >90  Non  GFR Calc   >60 mL/min/1.7m2 Final   11/27/2016 >90  Non  GFR Calc   >60 mL/min/1.7m2 Final   11/14/2016 77 >60 mL/min/1.7m2 Final     Comment:     Non  GFR Calc     GFR Estimate If Black   Date Value Ref Range Status   03/24/2017 >90   GFR Calc   >60 mL/min/1.7m2 Final   11/27/2016 >90   GFR Calc   >60 mL/min/1.7m2 Final   11/14/2016 >90   GFR Calc   >60 mL/min/1.7m2 Final     Lab Results   Component Value Date    CHOL 192 01/10/2017     Lab Results   Component Value Date    HDL 51 01/10/2017     Lab Results   Component Value Date    LDL 78 01/10/2017     Lab Results   Component Value Date    TRIG 316 01/10/2017     Lab Results   Component Value Date    CHOLHDLRATIO 4.2 01/10/2015     Lab Results   Component Value Date    AST 37 01/10/2017     Lab Results   Component Value Date    ALT 38 01/10/2017     Lab Results   Component Value Date    A1C 9.0 03/24/2017    A1C 9.2 01/10/2017    A1C 9.4 01/07/2017    A1C 9.0 09/17/2016    A1C 8.1 04/05/2016     Potassium   Date Value Ref Range Status   03/24/2017 3.9 3.4 - 5.3 mmol/L Final       Araseli Alegria CMA

## 2017-05-30 NOTE — TELEPHONE ENCOUNTER
Prescription approved per [facility].  Virginia Roque, RN - BC

## 2017-06-01 ENCOUNTER — TELEPHONE (OUTPATIENT)
Dept: FAMILY MEDICINE | Facility: CLINIC | Age: 47
End: 2017-06-01

## 2017-06-01 ENCOUNTER — MYC REFILL (OUTPATIENT)
Dept: FAMILY MEDICINE | Facility: CLINIC | Age: 47
End: 2017-06-01

## 2017-06-01 DIAGNOSIS — E11.9 TYPE 2 DIABETES MELLITUS WITHOUT COMPLICATION (H): ICD-10-CM

## 2017-06-01 DIAGNOSIS — I47.10 PAROXYSMAL SUPRAVENTRICULAR TACHYCARDIA (H): ICD-10-CM

## 2017-06-01 NOTE — TELEPHONE ENCOUNTER
Routing refill request to provider for review/approval because:  A break in medication, asked patient if she is currently taking.  Patient response: Yes, I only am suppose to take it when I have a SVT attack. I do not have those every often. Thank goodness.    Please advise   Donya Lagos RN

## 2017-06-01 NOTE — TELEPHONE ENCOUNTER
One touch ultra blue test strips are NOT covered by patients insurance. *Please send a new meter, strips and lancets with directions, quantity and refills per pharmacy.     Send generic supplies.

## 2017-06-01 NOTE — TELEPHONE ENCOUNTER
Diltiazem          Last Written Prescription Date: 8/31/2015  Last Fill Quantity: 30, # refills: 1    Last Office Visit with G, P or [facility] prescribing provider:  4/26/17   Future Office Visit:      BP Readings from Last 3 Encounters:   04/26/17 122/84   04/18/17 143/90   03/24/17 122/78     Lab Results   Component Value Date    ALT 38 01/10/2017     Lab Results   Component Value Date    CHOL 192 01/10/2017     Lab Results   Component Value Date    HDL 51 01/10/2017     Lab Results   Component Value Date    LDL 78 01/10/2017     Lab Results   Component Value Date    TRIG 316 01/10/2017     Lab Results   Component Value Date    CHOLHDLRATIO 4.2 01/10/2015     Donya Lagos RN

## 2017-06-01 NOTE — TELEPHONE ENCOUNTER
Message from [employer]:  Sherlyn Barrett Thu Jun 1, 2017 3:43 PM        ----- Message -----   From: Maranda Mallory   Sent: 6/1/2017 1:05 PM   To: [facility] [place]  Subject: Medication Renewal Request     Original authorizing provider: MD Maranda Ford would like a refill of the following medications:  diltiazem (CARDIZEM CD) 120 MG 24 hr CD capsule [Zainab Aguilar MD]    Preferred pharmacy: [facility] [address] [place] [address] AVE [address] AT [address] [facility]    Comment:

## 2017-06-01 NOTE — TELEPHONE ENCOUNTER
Sent.    Signed Prescriptions:                        Disp   Refills    blood glucose monitoring (NO BRAND SPECIFI*1 kit  0        Sig: Use to test blood sugar 3 times daily or as directed  Authorizing Provider: MIKI ABRAHAM  Ordering User: SAUCEDA, LINHDA    blood glucose (NO BRAND SPECIFIED) lancets*3 Box  3        Sig: Use to test blood sugar 3 times daily or as directed.  Authorizing Provider: MIKI ABRAHAM  Ordering User: SAUCEDA, LINHDA    blood glucose monitoring (NO BRAND SPECIFI*[address] st*3        Sig: Use to test blood sugars 3 times daily or as directed  Authorizing Provider: MIKI ABRAHAM  Ordering User: SAUCEDA, LINHDA

## 2017-06-02 RX ORDER — DILTIAZEM HYDROCHLORIDE 120 MG/1
120 CAPSULE, COATED, EXTENDED RELEASE ORAL DAILY
Qty: 30 CAPSULE | Refills: 1 | Status: SHIPPED | OUTPATIENT
Start: 2017-06-02

## 2017-06-12 ENCOUNTER — MYC MEDICAL ADVICE (OUTPATIENT)
Dept: FAMILY MEDICINE | Facility: CLINIC | Age: 47
End: 2017-06-12

## 2017-06-12 DIAGNOSIS — F40.243 FEAR OF FLYING: Primary | ICD-10-CM

## 2017-06-12 RX ORDER — LORAZEPAM 0.5 MG/1
TABLET ORAL
Qty: 4 TABLET | Refills: 0 | Status: SHIPPED | OUTPATIENT
Start: 2017-06-12 | End: 2017-10-12

## 2017-06-13 ENCOUNTER — TRANSFERRED RECORDS (OUTPATIENT)
Dept: HEALTH INFORMATION MANAGEMENT | Facility: CLINIC | Age: 47
End: 2017-06-13

## 2017-06-13 LAB
ALT SERPL-CCNC: 24 IU/L (ref 0–32)
AST SERPL-CCNC: 28 IU/L (ref 0–40)
CREAT SERPL-MCNC: 0.65 MG/DL (ref 0.57–1)
GFR SERPL CREATININE-BSD FRML MDRD: 108 ML/MIN/1.73
GLUCOSE SERPL-MCNC: 344 MG/DL (ref 65–99)
HEP C HIM: NORMAL
POTASSIUM SERPL-SCNC: 3.8 MMOL/L (ref 3.5–5.2)

## 2017-06-13 NOTE — TELEPHONE ENCOUNTER
All she needs is an A1c prior to her appointment with me.  3 months after last A1c.      Ativan written

## 2017-06-19 NOTE — PROGRESS NOTES
Clinic Care Coordination Contact  Care Team Conversations    Patient was last contacted 5/2/2017. Chart reviewed. No concerns noted at this time. Patient has not outreached to RN or SW CC.  Patient has a letter with contact information and access plan to reference if needed. Will close to active CC outreaches at this time.    MEGAN Childs  Care Coordination  [facility] [place]  [phone]  [email or web address]

## 2017-06-20 ENCOUNTER — MYC MEDICAL ADVICE (OUTPATIENT)
Dept: FAMILY MEDICINE | Facility: CLINIC | Age: 47
End: 2017-06-20

## 2017-06-20 NOTE — TELEPHONE ENCOUNTER
[facility] called ([phone]) and spoke with the [place] office and asked that labs be faxed to [phone].  Cristel Coleman,

## 2017-07-11 ENCOUNTER — MYC MEDICAL ADVICE (OUTPATIENT)
Dept: FAMILY MEDICINE | Facility: CLINIC | Age: 47
End: 2017-07-11

## 2017-07-11 DIAGNOSIS — E11.9 TYPE 2 DIABETES MELLITUS WITHOUT COMPLICATION, WITH LONG-TERM CURRENT USE OF INSULIN (H): Primary | ICD-10-CM

## 2017-07-11 DIAGNOSIS — Z79.4 TYPE 2 DIABETES MELLITUS WITHOUT COMPLICATION, WITH LONG-TERM CURRENT USE OF INSULIN (H): Primary | ICD-10-CM

## 2017-07-11 NOTE — TELEPHONE ENCOUNTER
Increase her Tresiba from 36 units to 40 units.  Send me her blood sugar readings in 3 days (with the time of day) or have her follow up with MTM or DM ed.

## 2017-07-12 DIAGNOSIS — E11.9 TYPE 2 DIABETES MELLITUS WITHOUT COMPLICATION (H): ICD-10-CM

## 2017-07-12 NOTE — TELEPHONE ENCOUNTER
insulin degludec (TRESIBA) 100 UNIT/ML pen         Last Written Prescription Date: 6/8/17  Last Fill Quantity: 36 mL, # refills: 0  Last Office Visit with G, P or [facility] prescribing provider:  4/26/17        BP Readings from Last 3 Encounters:   04/26/17 122/84   04/18/17 143/90   03/24/17 122/78     Lab Results   Component Value Date    MICROL 71 09/28/2016     Lab Results   Component Value Date    UMALCR 58.51 09/28/2016     Creatinine   Date Value Ref Range Status   06/13/2017 0.65 0.57 - 1.00 mg/dL Final   ]  GFR Estimate   Date Value Ref Range Status   06/13/2017 108 >59 mL/min/1.73 Final   03/24/2017 >90  Non  GFR Calc   >60 mL/min/1.7m2 Final   11/27/2016 >90  Non  GFR Calc   >60 mL/min/1.7m2 Final     GFR Estimate If Black   Date Value Ref Range Status   06/13/2017 125 >59 mL/min/1.73 Final   03/24/2017 >90   GFR Calc   >60 mL/min/1.7m2 Final   11/27/2016 >90   GFR Calc   >60 mL/min/1.7m2 Final     Lab Results   Component Value Date    CHOL 192 01/10/2017     Lab Results   Component Value Date    HDL 51 01/10/2017     Lab Results   Component Value Date    LDL 78 01/10/2017     Lab Results   Component Value Date    TRIG 316 01/10/2017     Lab Results   Component Value Date    CHOLHDLRATIO 4.2 01/10/2015     Lab Results   Component Value Date    AST 28 06/13/2017     Lab Results   Component Value Date    ALT 24 06/13/2017     Lab Results   Component Value Date    A1C 9.0 03/24/2017    A1C 9.2 01/10/2017    A1C 9.4 01/07/2017    A1C 9.0 09/17/2016    A1C 8.1 04/05/2016     Potassium   Date Value Ref Range Status   06/13/2017 3.8 3.5 - 5.2 mmol/L Final

## 2017-07-13 NOTE — TELEPHONE ENCOUNTER
Tresiba not covered.  Patient reports that Basaglar is coverd.    Please advise. Prescription jorge'd up    Nguyễn Howe RN

## 2017-07-14 RX ORDER — INSULIN GLARGINE 100 [IU]/ML
36 INJECTION, SOLUTION SUBCUTANEOUS DAILY
Qty: 35 ML | Refills: 3 | Status: SHIPPED | OUTPATIENT
Start: 2017-07-14 | End: 2017-10-19

## 2017-07-17 RX ORDER — INSULIN DEGLUDEC 100 U/ML
INJECTION, SOLUTION SUBCUTANEOUS
Refills: 0
Start: 2017-07-17

## 2017-07-17 NOTE — TELEPHONE ENCOUNTER
Spoke to pharmacy. Insured will cover Basglar with a $15 co-pay. Please disregard Rx request for Tresiba.  Patrica MAYFIELD CMA ([facility])

## 2017-07-19 ENCOUNTER — TRANSFERRED RECORDS (OUTPATIENT)
Dept: HEALTH INFORMATION MANAGEMENT | Facility: CLINIC | Age: 47
End: 2017-07-19

## 2017-07-26 ENCOUNTER — TELEPHONE (OUTPATIENT)
Dept: NURSING | Facility: CLINIC | Age: 47
End: 2017-07-26

## 2017-07-26 NOTE — LETTER
[address]  [place] [address]  Phone: [phone]            July 26, 2017          Maranda Mallory,  [address]   [place] [address]        Dear Maranda Mallory      Monitoring and managing your preventative and chronic health conditions are very important to us. Our records indicate that you have not scheduled for Appointment with your provider, Diabetic Check and HgbA1C  which was recommended by Dr. Aguilar      If you have received your health care elsewhere, please call the clinic so the information can be documented in your chart.    Please call [phone] or message us through your [employer] account to schedule an appointment or provide information for your chart.     Feel free to contact us if you have any questions or concerns!    I look forward to seeing you and working with you on your health care needs.     Sincerely,         Zainab Aguilar / PAMELA/MA

## 2017-07-26 NOTE — TELEPHONE ENCOUNTER
Panel Management Review      Patient has the following on her problem list:     Diabetes    ASA: Passed    Last A1C  Lab Results   Component Value Date    A1C 9.0 03/24/2017    A1C 9.2 01/10/2017    A1C 9.4 01/07/2017    A1C 9.0 09/17/2016    A1C 8.1 04/05/2016     A1C tested: FAILED    Last LDL:    Lab Results   Component Value Date    CHOL 192 01/10/2017     Lab Results   Component Value Date    HDL 51 01/10/2017     Lab Results   Component Value Date    LDL 78 01/10/2017     Lab Results   Component Value Date    TRIG 316 01/10/2017     Lab Results   Component Value Date    CHOLHDLRATIO 4.2 01/10/2015     Lab Results   Component Value Date    NHDL 141 01/10/2017       Is the patient on a Statin? YES             Is the patient on Aspirin? YES    Medications     HMG CoA Reductase Inhibitors    atorvastatin (LIPITOR) 40 MG tablet    Salicylates    aspirin 81 MG tablet          Last three blood pressure readings:  BP Readings from Last 3 Encounters:   04/26/17 122/84   04/18/17 143/90   03/24/17 122/78       Date of last diabetes office visit: 4/26/17     Tobacco History:     History   Smoking Status     Former Smoker     Packs/day: 0.30     Years: 5.00     Types: Cigarettes     Quit date: 1/1/2005   Smokeless Tobacco     Never Used     Comment: 1/2010             Composite cancer screening  Chart review shows that this patient is due/due soon for the following None  Summary:    Patient is due/failing the following:   A1C    Action needed:   Patient needs office visit for DM.    Type of outreach:    Sent letter.    Questions for provider review:    None                                                                                                                                    PAMELA/MA       Chart routed to.

## 2017-08-05 DIAGNOSIS — E78.5 HYPERLIPIDEMIA LDL GOAL <100: ICD-10-CM

## 2017-08-07 RX ORDER — ATORVASTATIN CALCIUM 40 MG/1
TABLET, FILM COATED ORAL
Qty: 90 TABLET | Refills: 1 | Status: SHIPPED | OUTPATIENT
Start: 2017-08-07 | End: 2018-02-17

## 2017-08-07 NOTE — TELEPHONE ENCOUNTER
atorvastatin (LIPITOR) 40 MG tablet     Last Written Prescription Date: 01/20/17  Last Fill Quantity: 90, # refills: 1  Last Office Visit with [facility], [facility] or [facility] prescribing provider: 04/26/17       Lab Results   Component Value Date    CHOL 192 01/10/2017     Lab Results   Component Value Date    HDL 51 01/10/2017     Lab Results   Component Value Date    LDL 78 01/10/2017     Lab Results   Component Value Date    TRIG 316 01/10/2017     Lab Results   Component Value Date    CHOLHDLRATIO 4.2 01/10/2015       Araseli Alegria [facility]

## 2017-08-07 NOTE — TELEPHONE ENCOUNTER
atorvastatin (LIPITOR) 40 MG tablet     Last Written Prescription Date: 1/20/2017  Last Fill Quantity: 90, # refills: 1  Last Office Visit with [facility], [facility] or [facility] prescribing provider: 4/23/2017       Lab Results   Component Value Date    CHOL 192 01/10/2017     Lab Results   Component Value Date    HDL 51 01/10/2017     Lab Results   Component Value Date    LDL 78 01/10/2017     Lab Results   Component Value Date    TRIG 316 01/10/2017     Lab Results   Component Value Date    CHOLHDLRATIO 4.2 01/10/2015

## 2017-08-07 NOTE — TELEPHONE ENCOUNTER
Prescription approved per [facility] Refill Protocol.  Virginia Roque, RN - BC

## 2017-08-14 DIAGNOSIS — G35 MULTIPLE SCLEROSIS (H): Primary | ICD-10-CM

## 2017-08-14 LAB
ALBUMIN UR-MCNC: ABNORMAL MG/DL
AMORPH CRY #/AREA URNS HPF: ABNORMAL /HPF
APPEARANCE UR: ABNORMAL
BACTERIA #/AREA URNS HPF: ABNORMAL /HPF
BILIRUB UR QL STRIP: NEGATIVE
COLOR UR AUTO: YELLOW
GLUCOSE UR STRIP-MCNC: >=1000 MG/DL
HGB UR QL STRIP: ABNORMAL
KETONES UR STRIP-MCNC: 40 MG/DL
LEUKOCYTE ESTERASE UR QL STRIP: NEGATIVE
NITRATE UR QL: POSITIVE
NON-SQ EPI CELLS #/AREA URNS LPF: ABNORMAL /LPF
PH UR STRIP: 6 PH (ref 5–7)
RBC #/AREA URNS AUTO: ABNORMAL /HPF (ref 0–2)
SP GR UR STRIP: 1.02 (ref 1–1.03)
URN SPEC COLLECT METH UR: ABNORMAL
URNS CMNT MICRO: ABNORMAL
UROBILINOGEN UR STRIP-ACNC: 0.2 EU/DL (ref 0.2–1)
WBC #/AREA URNS AUTO: ABNORMAL /HPF (ref 0–2)

## 2017-08-14 PROCEDURE — 81001 URINALYSIS AUTO W/SCOPE: CPT | Performed by: INTERNAL MEDICINE

## 2017-08-14 PROCEDURE — 87086 URINE CULTURE/COLONY COUNT: CPT | Performed by: INTERNAL MEDICINE

## 2017-08-15 ENCOUNTER — TRANSFERRED RECORDS (OUTPATIENT)
Dept: HEALTH INFORMATION MANAGEMENT | Facility: CLINIC | Age: 47
End: 2017-08-15

## 2017-08-15 LAB
BACTERIA SPEC CULT: NORMAL
BACTERIA SPEC CULT: NORMAL
SPECIMEN SOURCE: NORMAL

## 2017-09-13 ENCOUNTER — DOCUMENTATION ONLY (OUTPATIENT)
Dept: FAMILY MEDICINE | Facility: CLINIC | Age: 47
End: 2017-09-13

## 2017-09-13 DIAGNOSIS — Z79.4 TYPE 2 DIABETES MELLITUS WITH HYPEROSMOLARITY WITHOUT COMA, WITH LONG-TERM CURRENT USE OF INSULIN (H): Primary | ICD-10-CM

## 2017-09-13 DIAGNOSIS — E78.5 HYPERLIPIDEMIA LDL GOAL <100: ICD-10-CM

## 2017-09-13 DIAGNOSIS — E83.42 HYPOMAGNESEMIA: ICD-10-CM

## 2017-09-13 DIAGNOSIS — E11.00 TYPE 2 DIABETES MELLITUS WITH HYPEROSMOLARITY WITHOUT COMA, WITH LONG-TERM CURRENT USE OF INSULIN (H): Primary | ICD-10-CM

## 2017-09-13 DIAGNOSIS — K76.0 NON-ALCOHOLIC FATTY LIVER DISEASE: ICD-10-CM

## 2017-09-16 DIAGNOSIS — Z79.4 TYPE 2 DIABETES MELLITUS WITH HYPEROSMOLARITY WITHOUT COMA, WITH LONG-TERM CURRENT USE OF INSULIN (H): ICD-10-CM

## 2017-09-16 DIAGNOSIS — K76.0 NON-ALCOHOLIC FATTY LIVER DISEASE: ICD-10-CM

## 2017-09-16 DIAGNOSIS — E11.00 TYPE 2 DIABETES MELLITUS WITH HYPEROSMOLARITY WITHOUT COMA, WITH LONG-TERM CURRENT USE OF INSULIN (H): ICD-10-CM

## 2017-09-16 DIAGNOSIS — D50.8 OTHER IRON DEFICIENCY ANEMIA: ICD-10-CM

## 2017-09-16 DIAGNOSIS — E78.5 HYPERLIPIDEMIA LDL GOAL <100: ICD-10-CM

## 2017-09-16 DIAGNOSIS — E83.42 HYPOMAGNESEMIA: ICD-10-CM

## 2017-09-16 LAB — HBA1C MFR BLD: 11.1 % (ref 4.3–6)

## 2017-09-16 PROCEDURE — 80053 COMPREHEN METABOLIC PANEL: CPT | Performed by: INTERNAL MEDICINE

## 2017-09-16 PROCEDURE — 82728 ASSAY OF FERRITIN: CPT | Performed by: INTERNAL MEDICINE

## 2017-09-16 PROCEDURE — 36415 COLL VENOUS BLD VENIPUNCTURE: CPT | Performed by: INTERNAL MEDICINE

## 2017-09-16 PROCEDURE — 82043 UR ALBUMIN QUANTITATIVE: CPT | Performed by: INTERNAL MEDICINE

## 2017-09-16 PROCEDURE — 83735 ASSAY OF MAGNESIUM: CPT | Performed by: INTERNAL MEDICINE

## 2017-09-16 PROCEDURE — 80061 LIPID PANEL: CPT | Performed by: INTERNAL MEDICINE

## 2017-09-16 PROCEDURE — 83036 HEMOGLOBIN GLYCOSYLATED A1C: CPT | Performed by: INTERNAL MEDICINE

## 2017-09-16 PROCEDURE — 83540 ASSAY OF IRON: CPT | Performed by: INTERNAL MEDICINE

## 2017-09-16 PROCEDURE — 83550 IRON BINDING TEST: CPT | Performed by: INTERNAL MEDICINE

## 2017-09-18 LAB
ALBUMIN SERPL-MCNC: 3.6 G/DL (ref 3.4–5)
ALP SERPL-CCNC: 117 U/L (ref 40–150)
ALT SERPL W P-5'-P-CCNC: 27 U/L (ref 0–50)
ANION GAP SERPL CALCULATED.3IONS-SCNC: 12 MMOL/L (ref 3–14)
AST SERPL W P-5'-P-CCNC: 17 U/L (ref 0–45)
BILIRUB SERPL-MCNC: 0.3 MG/DL (ref 0.2–1.3)
BUN SERPL-MCNC: 6 MG/DL (ref 7–30)
CALCIUM SERPL-MCNC: 8.3 MG/DL (ref 8.5–10.1)
CHLORIDE SERPL-SCNC: 99 MMOL/L (ref 94–109)
CHOLEST SERPL-MCNC: 173 MG/DL
CO2 SERPL-SCNC: 25 MMOL/L (ref 20–32)
CREAT SERPL-MCNC: 0.43 MG/DL (ref 0.52–1.04)
CREAT UR-MCNC: 85 MG/DL
FERRITIN SERPL-MCNC: 16 NG/ML (ref 8–252)
GFR SERPL CREATININE-BSD FRML MDRD: >90 ML/MIN/1.7M2
GLUCOSE SERPL-MCNC: 225 MG/DL (ref 70–99)
HDLC SERPL-MCNC: 43 MG/DL
IRON SATN MFR SERPL: 13 % (ref 15–46)
IRON SERPL-MCNC: 50 UG/DL (ref 35–180)
LDLC SERPL CALC-MCNC: 53 MG/DL
MAGNESIUM SERPL-MCNC: 1.7 MG/DL (ref 1.6–2.3)
MICROALBUMIN UR-MCNC: 31 MG/L
MICROALBUMIN/CREAT UR: 36.88 MG/G CR (ref 0–25)
NONHDLC SERPL-MCNC: 130 MG/DL
POTASSIUM SERPL-SCNC: 3.4 MMOL/L (ref 3.4–5.3)
PROT SERPL-MCNC: 7.5 G/DL (ref 6.8–8.8)
SODIUM SERPL-SCNC: 136 MMOL/L (ref 133–144)
TIBC SERPL-MCNC: 384 UG/DL (ref 240–430)
TRIGL SERPL-MCNC: 384 MG/DL

## 2017-09-26 ENCOUNTER — TELEPHONE (OUTPATIENT)
Dept: PHARMACY | Facility: OTHER | Age: 47
End: 2017-09-26

## 2017-09-26 NOTE — TELEPHONE ENCOUNTER
MTM referral from: [place] clinic visit (referral by provider)    MTM referral outreach attempt #1 on September 26, 2017 at 4:28 PM      Outcome: Left Message    Virginia Howard MTM Coordinator

## 2017-09-27 NOTE — TELEPHONE ENCOUNTER
MTM referral from: [place] clinic visit (referral by provider)    MTM referral outreach attempt #2 on September 27, 2017 at 1:36 PM      Outcome: Patient scheduled for MTM appointment on 10/12/17    Adriana Orosco MTM Coordinator

## 2017-10-10 ENCOUNTER — TELEPHONE (OUTPATIENT)
Dept: INTERNAL MEDICINE | Facility: CLINIC | Age: 47
End: 2017-10-10

## 2017-10-10 DIAGNOSIS — E11.9 TYPE 2 DIABETES MELLITUS WITHOUT COMPLICATION (H): ICD-10-CM

## 2017-10-10 NOTE — TELEPHONE ENCOUNTER
Received fax from pharmacy stating patient requires Prior Authorization for Novolog Flexpen injection 5x3 mL.     Insurance information:   Name: [employer]  Phone number: [phone]  ID number: [id number]    Initiate prior authorization or change medication?    If a prior authorization is to be initiated, please list the following:    -any medications the patient has tried and failed or any contraindications.  -is the patient currently on this medication, or has tried before?  -What is the diagnosis?  -Justification or other information that may be helpful.

## 2017-10-10 NOTE — TELEPHONE ENCOUNTER
Call to find out the issue.  Do they just want Humalog flexpen?  If so, ok for same dose.   Dr. Aguilar

## 2017-10-11 NOTE — TELEPHONE ENCOUNTER
Reason for Call:  Other returning call    Detailed comments:  Patient returning the call.     Phone Number Patient can be reached at: Home number on file [phone] (home)    Best Time: any     Can we leave a detailed message on this number? YES    Call taken on 10/11/2017 at 11:08 AM by Livia Watt

## 2017-10-11 NOTE — TELEPHONE ENCOUNTER
Please call to see if there is an alternative they would cover.  Humalog Flexpens? Vials? Etc    Route back to RN triage pool with updates    Nguyễn Howe RN

## 2017-10-11 NOTE — TELEPHONE ENCOUNTER
Left message on voicemail for patient to return call to RN hotline at # [phone].  Nguyễn Howe RN

## 2017-10-12 ENCOUNTER — OFFICE VISIT (OUTPATIENT)
Dept: PHARMACY | Facility: CLINIC | Age: 47
End: 2017-10-12
Payer: COMMERCIAL

## 2017-10-12 ENCOUNTER — OFFICE VISIT (OUTPATIENT)
Dept: MIDWIFE SERVICES | Facility: CLINIC | Age: 47
End: 2017-10-12
Payer: COMMERCIAL

## 2017-10-12 VITALS
HEIGHT: 68 IN | TEMPERATURE: 97.9 F | HEART RATE: 90 BPM | OXYGEN SATURATION: 97 % | DIASTOLIC BLOOD PRESSURE: 88 MMHG | BODY MASS INDEX: 35.95 KG/M2 | SYSTOLIC BLOOD PRESSURE: 127 MMHG | WEIGHT: 237.2 LBS

## 2017-10-12 DIAGNOSIS — Z79.4 TYPE 2 DIABETES MELLITUS WITHOUT COMPLICATION, WITH LONG-TERM CURRENT USE OF INSULIN (H): ICD-10-CM

## 2017-10-12 DIAGNOSIS — B96.89 BV (BACTERIAL VAGINOSIS): Primary | ICD-10-CM

## 2017-10-12 DIAGNOSIS — N76.0 BV (BACTERIAL VAGINOSIS): Primary | ICD-10-CM

## 2017-10-12 DIAGNOSIS — E11.9 TYPE 2 DIABETES MELLITUS WITHOUT COMPLICATION, WITH LONG-TERM CURRENT USE OF INSULIN (H): ICD-10-CM

## 2017-10-12 DIAGNOSIS — N89.8 VAGINAL DISCHARGE: ICD-10-CM

## 2017-10-12 DIAGNOSIS — Z11.3 SCREEN FOR STD (SEXUALLY TRANSMITTED DISEASE): ICD-10-CM

## 2017-10-12 LAB
SPECIMEN SOURCE: ABNORMAL
WET PREP SPEC: ABNORMAL

## 2017-10-12 PROCEDURE — 99605 MTMS BY PHARM NP 15 MIN: CPT | Performed by: PHARMACIST

## 2017-10-12 PROCEDURE — 87210 SMEAR WET MOUNT SALINE/INK: CPT | Performed by: ADVANCED PRACTICE MIDWIFE

## 2017-10-12 PROCEDURE — 99607 MTMS BY PHARM ADDL 15 MIN: CPT | Performed by: PHARMACIST

## 2017-10-12 PROCEDURE — 99213 OFFICE O/P EST LOW 20 MIN: CPT | Performed by: ADVANCED PRACTICE MIDWIFE

## 2017-10-12 RX ORDER — VENLAFAXINE HYDROCHLORIDE 75 MG/1
75 CAPSULE, EXTENDED RELEASE ORAL DAILY
COMMUNITY

## 2017-10-12 RX ORDER — METRONIDAZOLE 500 MG/1
500 TABLET ORAL 2 TIMES DAILY
Qty: 14 TABLET | Refills: 0 | Status: SHIPPED | OUTPATIENT
Start: 2017-10-12 | End: 2017-10-23

## 2017-10-12 RX ORDER — METFORMIN HCL 500 MG
500 TABLET, EXTENDED RELEASE 24 HR ORAL
Qty: 120 TABLET | Refills: 3
Start: 2017-10-12

## 2017-10-12 RX ORDER — ERGOCALCIFEROL 1.25 MG/1
50000 CAPSULE, LIQUID FILLED ORAL
COMMUNITY

## 2017-10-12 RX ORDER — CARBAMAZEPINE 200 MG/1
200 CAPSULE, EXTENDED RELEASE ORAL 2 TIMES DAILY
COMMUNITY
End: 2017-12-20

## 2017-10-12 NOTE — PROGRESS NOTES
"Chief Complaint   Patient presents with     Vaginal Problem       Initial /88 (BP Location: Left arm, Patient Position: Sitting, Cuff Size: Adult Large)  Pulse 90  Temp 97.9  F (36.6  C) (Oral)  Ht 5' 7.75\" (1.721 m)  Wt 237 lb 3.2 oz (107.6 kg)  LMP 2017  SpO2 97%  Breastfeeding? No  BMI 36.33 kg/m2 Estimated body mass index is 36.33 kg/(m^2) as calculated from the following:    Height as of this encounter: 5' 7.75\" (1.721 m).    Weight as of this encounter: 237 lb 3.2 oz (107.6 kg).  BP completed using cuff size: large        The following HM Due: NONE      The following patient reported/Care Every where data was sent to:  P ABSTRACT QUALITY INITIATIVES [93900]  n/a     n/a and patient has appointment for today             "

## 2017-10-12 NOTE — PROGRESS NOTES
"SUBJECTIVE/OBJECTIVE:                           Maranda Mallory is a 47 year old female coming in for an initial visit for Medication Therapy Management.  She was referred to me from Dr. Aguilar.     Chief Complaint: Diabetes management.  Fatigue is her biggest concern.    Personal Healthcare Goals: Improve BG control, feel better, weight loss    Allergies/ADRs: Reviewed in Epic  Tobacco: History of tobacco dependence - quit 2005  Alcohol: Less than 1 beverage / month  Caffeine: 1 coffee cooler per day, regular soda 2-3 cans/week  Activity: None currently    PMH: Reviewed in Epic    Medication Adherence: Sets up own med boxes and takes meds 2 times per day, misses doses of her medications a few times a week at the most (generally the PM ones, falls asleep before taking).  Admits cost is a barrier to taking her medication regularly as well.    Diabetes:  Pt currently taking metformin ER 1000mg - taking a few times a week (causes diarrhea), Basaglar 36 units daily and Humalog 15 units BID (prescribed for TID, but she skips the lunch dose because her boss told her she couldn't take it at work).  She generally does not eat breakfast.  Admits she \"grazes\" all day at work - gets fatigued so snacks on chips and candy to increase energy levels.  She has been prescribed Victoza, but is not currently taking due to cost.  She did like the Victoza when she used it.  SMBG: Occasionally.   Ranges (patient reported): >300mg/dL; range 220-400mg/dL  Patient is not experiencing hypoglycemia  Recent symptoms of high blood sugar? Sweaty, faint feeling, feels \"sick\"  Eye exam: up to date  Foot exam: up to date  Microalbumin is not < 30 mg/g. Pt is not taking an ACEi/ARB.  Aspirin: Taking 81mg daily and denies side effects    Other medications were not discussed in detail today - were reviewed for medication reconciliation purposes.    Current labs include:BP Readings from Last 3 Encounters:   04/26/17 122/84   04/18/17 143/90   03/24/17 " 122/78     Lab Results   Component Value Date    A1C 11.1 09/16/2017   .  Lab Results   Component Value Date    CHOL 173 09/16/2017     Lab Results   Component Value Date    TRIG 384 09/16/2017     Lab Results   Component Value Date    HDL 43 09/16/2017     Lab Results   Component Value Date    LDL 53 09/16/2017       Liver Function Studies -   Recent Labs   Lab Test  09/16/17   0907   PROTTOTAL  7.5   ALBUMIN  3.6   BILITOTAL  0.3   ALKPHOS  117   AST  17   ALT  27       Lab Results   Component Value Date    UCRR 85 09/16/2017    MICROL 31 09/16/2017    UMALCR 36.88 (H) 09/16/2017       Last Basic Metabolic Panel:  Lab Results   Component Value Date     09/16/2017      Lab Results   Component Value Date    POTASSIUM 3.4 09/16/2017     Lab Results   Component Value Date    CHLORIDE 99 09/16/2017     Lab Results   Component Value Date    BUN 6 09/16/2017     Lab Results   Component Value Date    CR 0.43 09/16/2017     GFR Estimate   Date Value Ref Range Status   09/16/2017 >90 >60 mL/min/1.7m2 Final     Comment:     Non  GFR Calc   06/13/2017 108 >59 mL/min/1.73 Final   03/24/2017 >90  Non  GFR Calc   >60 mL/min/1.7m2 Final       TSH   Date Value Ref Range Status   01/10/2017 1.27 0.40 - 4.00 mU/L Final   ]    Most Recent Immunizations   Administered Date(s) Administered     Influenza (H1N1) 01/07/2010     Influenza (IIV3) 10/25/2014     Influenza Vaccine IM 3yrs+ 4 Valent IIV4 09/28/2016     Pneumococcal 23 valent 09/25/2009     TD (ADULT, 7+) 10/28/2002     TDAP Vaccine (Boostrix) 03/20/2012       ASSESSMENT:                             Current medications were reviewed today.     Medication Adherence: Needs improvements.  May benefit from contacting the [employer] Rx Assistance Program to see if she'd be eligible for any assistance with her medications.    Diabetes: Needs Improvement. Patient is not meeting A1c goal of < 7%. Self monitoring of blood glucose is not at goal of  fasting  mg/dL and post prandial < 180 mg/dL.  While her MS certainly can play a role in fatigue, her BG likely are as well.  Would benefit from eating breakfast, and taking Humalog right before; and from using Humalog with lunch.  She feels she can take Humalog on her lunch break rather than at her desk.  She needed additional education regarding this.  May benefit from taking metformin, at a lower dose, every day to see how tolerability is.  Will hold off on re-starting Victoza for now.  Will see how above changes impact BG control before adjusting insulin doses, although dose adjustments will likely be needed.     PLAN:                          1.  Encouraged Maranda to call the [employer] Rx Assistance Program to see if she's eligible for assistance with her medications (now or in 2018).  2.  Recommended changing metformin ER to 500mg daily to see if she can tolerate it.  3.  Discussed mechanism of action/duration of insulins - recommended eating breakfast and taking Humalog immediately before breakfast and lunch (on her break).  She feels this is feasible.  4.  Asked her to start testing her BG more often - ideal times are before or 2-hours after a meal.  5.  Advised if she skips a meal, she should skip Humalog.    I spent 50 minutes with this patient today. All changes were made via collaborative practice agreement with Zainab Aguilar. A copy of the visit note was provided to the patient's primary care provider.    Will follow up in 1 week, appt scheduled.    The patient was given a summary of these recommendations as an after visit summary.   Lena Bedolla, MartiD, [facility]  Medication Therapy Management Provider  Pager: [phone]

## 2017-10-12 NOTE — MR AVS SNAPSHOT
After Visit Summary   10/12/2017    Maranda Mallory    MRN: [id number]           Patient Information     Date Of Birth          1970        Visit Information        Provider Department      10/12/2017 2:00 PM Tiffanie Taylor APRN CNM [facility]        Today's Diagnoses     BV (bacterial vaginosis)    -  1    Vaginal discharge        Screen for STD (sexually transmitted disease)           Follow-ups after your visit        Your next 10 appointments already scheduled     Oct 19, 2017  1:30 PM CDT   SHORT with Lena Bedolla [facility] ([facility])    [address] [place] [id number] [phone]            Oct 23, 2017  6:00 PM CDT   Office Visit with Zainab Aguilar MD   [facility] ([facility])    [address] [place] [id number] [phone]           Bring a current list of meds and any records pertaining to this visit. For Physicals, please bring immunization records and any forms needing to be filled out. Please arrive 10 minutes early to complete paperwork.              Who to contact     If you have questions or need follow up information about today's clinic visit or your schedule please contact [facility] directly at [phone].  Normal or non-critical lab and imaging results will be communicated to you by MyChart, letter or phone within 4 business days after the clinic has received the results. If you do not hear from us within 7 days, please contact the clinic through MyChart or phone. If you have a critical or abnormal lab result, we will notify you by phone as soon as possible.  Submit refill requests through "[employer]" or call your pharmacy and they will forward the refill request to us. Please allow 3 business days for your refill to be completed.          Additional Information About Your Visit        [employer] Information     "[employer] gives you secure  "access to your electronic health record. If you see a primary care provider, you can also send messages to your care team and make appointments. If you have questions, please call your primary care clinic.  If you do not have a primary care provider, please call [phone] and they will assist you.        Care EveryWhere ID     This is your Care EveryWhere ID. This could be used by other organizations to access your [place] medical records  [id number]        Your Vitals Were     Pulse Temperature Height Last Period Pulse Oximetry Breastfeeding?    90 97.9  F (36.6  C) (Oral) 5' 7.75\" (1.721 m) 09/29/2017 97% No    BMI (Body Mass Index)                   36.33 kg/m2            Blood Pressure from Last 3 Encounters:   10/12/17 127/88   04/26/17 122/84   04/18/17 143/90    Weight from Last 3 Encounters:   10/12/17 237 lb 3.2 oz (107.6 kg)   04/26/17 238 lb 9.6 oz (108.2 kg)   04/18/17 241 lb 3.2 oz (109.4 kg)              We Performed the Following     CHLAMYDIA TRACHOMATIS PCR     NEISSERIA GONORRHOEA PCR     Wet prep          Today's Medication Changes          These changes are accurate as of: 10/12/17 11:59 PM.  If you have any questions, ask your nurse or doctor.               Start taking these medicines.        Dose/Directions    metroNIDAZOLE 500 MG tablet   Commonly known as:  FLAGYL   Used for:  BV (bacterial vaginosis)   Started by:  Tiffanie Taylor APRN CNM        Dose:  500 mg   Take 1 tablet (500 mg) by mouth 2 times daily   Quantity:  14 tablet   Refills:  0         These medicines have changed or have updated prescriptions.        Dose/Directions    metFORMIN 500 MG 24 hr tablet   Commonly known as:  GLUCOPHAGE-XR   This may have changed:    - how much to take  - when to take this   Used for:  Type 2 diabetes mellitus without complication, with long-term current use of insulin (H)   Changed by:  Lena Bedolla, [facility]        Dose:  500 mg   Take 1 tablet (500 mg) by mouth daily (with dinner) "   Quantity:  120 tablet   Refills:  3            Where to get your medicines      These medications were sent to [id number] [address] AT [facility]  [address], [facility] [address]     Phone:  [phone]     metroNIDAZOLE 500 MG tablet         Some of these will need a paper prescription and others can be bought over the counter.  Ask your nurse if you have questions.     You don't need a prescription for these medications     metFORMIN 500 MG 24 hr tablet                Primary Care Provider Office Phone # Fax #    Zainab Aguilar [phone]       [address] [address]        Equal Access to Services     KASI PERDUE : Hadii radha murphy hadasho Sonadiya, waaxda luqadaha, qaybta kaalmada adeegyada, janna august. So [facility] [phone].    ATENCIÓN: Si habla español, tiene a ramsay disposición servicios gratuitos de asistencia lingüística. [facility] [phone].    We comply with applicable federal civil rights laws and [place] laws. We do not discriminate on the basis of race, color, national origin, age, disability, sex, sexual orientation, or gender identity.            Thank you!     Thank you for choosing [facility]  for your care. Our goal is always to provide you with excellent care. Hearing back from our patients is one way we can continue to improve our services. Please take a few minutes to complete the written survey that you may receive in the mail after your visit with us. Thank you!             Your Updated Medication List - Protect others around you: Learn how to safely use, store and throw away your medicines at www.disposemymeds.org.          This list is accurate as of: 10/12/17 11:59 PM.  Always use your most recent med list.                   Brand Name Dispense Instructions for use Diagnosis    aspirin 81 MG tablet     30 tablet    Take 81 mg  by mouth daily    Hyperlipidemia LDL goal <100       atorvastatin 40 MG tablet    LIPITOR    90 tablet    TAKE ONE TABLET BY MOUTH DAILY    Hyperlipidemia LDL goal <100       BASAGLAR 100 UNIT/ML injection     35 mL    Inject 36 Units Subcutaneous daily    Type 2 diabetes mellitus without complication, with long-term current use of insulin (H)       blood glucose calibration solution     1 each    Use to calibrate blood glucose monitor as directed.    Type 2 diabetes, HbA1c goal < 7% (H)       blood glucose lancets standard    no brand specified    3 Box    Use to test blood sugar 3 times daily or as directed.    Type 2 diabetes mellitus without complication (H)       blood glucose monitoring meter device kit    no brand specified    1 kit    Use to test blood sugar 3 times daily or as directed    Type 2 diabetes mellitus without complication (H)       blood glucose monitoring test strip    no brand specified    300 strip    Use to test blood sugars 3 times daily or as directed    Type 2 diabetes mellitus without complication (H)       carBAMazepine 200 MG 12 hr capsule    CARBATROL     Take 200 mg by mouth 2 times daily        diltiazem 120 MG 24 hr capsule    CARDIZEM CD    30 capsule    Take 1 capsule (120 mg) by mouth daily    Paroxysmal supraventricular tachycardia (H)       insulin lispro 100 UNIT/ML injection    HumaLOG KWIKpen    45 mL    Inject 15 Units Subcutaneous 3 times daily (with meals) . Insulin Pens    Type 2 diabetes mellitus without complication (H)       insulin pen needle 31G X 6 MM     300 each    Use 4 daily or as directed.    Type 2 diabetes mellitus with hyperglycemia, with long-term current use of insulin (H)       liraglutide 18 MG/3ML soln    VICTOZA    9 mL    Start with 0.6 MG daily for 1 week, then 1.2 MG daily for a week, and then 1.8 MG daily    Type 2 diabetes mellitus without complication (H)       metFORMIN 500 MG 24 hr tablet    GLUCOPHAGE-XR    120 tablet    Take 1 tablet (500  mg) by mouth daily (with dinner)    Type 2 diabetes mellitus without complication, with long-term current use of insulin (H)       metroNIDAZOLE 500 MG tablet    FLAGYL    14 tablet    Take 1 tablet (500 mg) by mouth 2 times daily    BV (bacterial vaginosis)       OCREVUS IV      Inject into the vein every 6 months        omeprazole 40 MG capsule    priLOSEC    180 capsule    TAKE ONE CAPSULE BY MOUTH TWICE DAILY    Gastroesophageal reflux disease, esophagitis presence not specified       Urea 20 % Crea cream     120 g    To dry and scaly feet twice a day as needed.    Skin cancer screening       venlafaxine 75 MG 24 hr capsule    EFFEXOR-XR     Take 75 mg by mouth daily        vitamin D 30136 UNIT capsule    ERGOCALCIFEROL     Take 50,000 Units by mouth twice a week

## 2017-10-12 NOTE — PATIENT INSTRUCTIONS
Recommendations from today's MTM visit:                                                    MTM (medication therapy management) is a service provided by a clinical pharmacist designed to help you get the most of out of your medicines.   Today we reviewed what your medicines are for, how to know if they are working, that your medicines are safe and how to make your medicine regimen as easy as possible.     1.  Change metformin to take 1 tablet daily (evening).  Let's see if you can tolerate this.    2.  Try eating breakfast, and take Humalog immediately before your breakfast.      3.  Also try taking Humalog immediately before lunch on your lunch break.  I'm hoping that eating regularly and taking your insulin regularly will help you feel less fatigued and snack less during the daytime.    4.  Call our [employer] Assistance Program at [phone] to see if you'll be eligible for any assistance with your medication costs in 2018.      5.  If you skip a meal, skip the Humalog.      6.  Try testing your blood sugars a little more often - the best times are right before or 2-hours after the start of your meal.  Write these down or bring your meter to our next visit.    Next MTM visit:  Next Thursday, October 19th at 1:30pm    To schedule another MTM appointment, please call the clinic directly or you may call the MTM scheduling line at [phone] or toll-free at [phone].     My Clinical Pharmacist's contact information:                                                      It was a pleasure seeing you today!  Please feel free to contact me with any questions or concerns you have.      Lena Bedolla PharmD, [facility]  Medication Therapy Management Provider  Pager: [phone]     You may receive a survey about the MTM services you received.  I would appreciate your feedback to help me serve you better in the future. Please fill it out and return it when you can. Your comments will be anonymous.

## 2017-10-12 NOTE — MR AVS SNAPSHOT
After Visit Summary   10/12/2017    Maranda Mallory    MRN: [id number]           Patient Information     Date Of Birth          1970        Visit Information        Provider Department      10/12/2017 12:30 PM Lena Bedolla, [facility] MTM        Today's Diagnoses     Type 2 diabetes mellitus without complication, with long-term current use of insulin (H)          Care Instructions    Recommendations from today's MTM visit:                                                    MTM (medication therapy management) is a service provided by a clinical pharmacist designed to help you get the most of out of your medicines.   Today we reviewed what your medicines are for, how to know if they are working, that your medicines are safe and how to make your medicine regimen as easy as possible.     1.  Change metformin to take 1 tablet daily (evening).  Let's see if you can tolerate this.    2.  Try eating breakfast, and take Humalog immediately before your breakfast.      3.  Also try taking Humalog immediately before lunch on your lunch break.  I'm hoping that eating regularly and taking your insulin regularly will help you feel less fatigued and snack less during the daytime.    4.  Call our [employer] Rx Assistance Program at [phone] to see if you'll be eligible for any assistance with your medication costs in 2018.      5.  If you skip a meal, skip the Humalog.      6.  Try testing your blood sugars a little more often - the best times are right before or 2-hours after the start of your meal.  Write these down or bring your meter to our next visit.    Next MTM visit:  Next Thursday, October 19th at 1:30pm    To schedule another MTM appointment, please call the clinic directly or you may call the MTM scheduling line at [phone] or toll-free at [phone].     My Clinical Pharmacist's contact information:                                                      It was a pleasure  seeing you today!  Please feel free to contact me with any questions or concerns you have.      Lena Bedolla, Samy, [facility]  Medication Therapy Management Provider  Pager: [phone]     You may receive a survey about the [facility] services you received.  I would appreciate your feedback to help me serve you better in the future. Please fill it out and return it when you can. Your comments will be anonymous.                     Follow-ups after your visit        Your next 10 appointments already scheduled     Oct 12, 2017  2:00 PM CDT   SHORT with KM Arreguin CNM   [facility]    [address] [place] [address] [address]  [facility] [id number] [phone]            Oct 19, 2017  1:30 PM CDT   SHORT with Lena Bedolla [facility] ([facility])    [address] [place] [id number] [phone]            Oct 23, 2017  6:00 PM CDT   Office Visit with Zainab Aguilar MD   [facility] ([facility]    [address] [place] [id number] [phone]           Bring a current list of meds and any records pertaining to this visit. For Physicals, please bring immunization records and any forms needing to be filled out. Please arrive 10 minutes early to complete paperwork.              Who to contact     If you have questions or need follow up information about today's clinic visit or your schedule please contact [facility] directly at [phone].  Normal or non-critical lab and imaging results will be communicated to you by MyChart, letter or phone within 4 business days after the clinic has received the results. If you do not hear from us within 7 days, please contact the clinic through MyChart or phone. If you have a critical or abnormal lab result, we will notify you by phone as soon as possible.  Submit refill requests through [employer] or call your pharmacy and  they will forward the refill request to us. Please allow 3 business days for your refill to be completed.          Additional Information About Your Visit        [employer] Information     [employer] gives you secure access to your electronic health record. If you see a primary care provider, you can also send messages to your care team and make appointments. If you have questions, please call your primary care clinic.  If you do not have a primary care provider, please call [phone] and they will assist you.        Care EveryWhere ID     This is your Care EveryWhere ID. This could be used by other organizations to access your [facility] medical records  [id number]         Blood Pressure from Last 3 Encounters:   04/26/17 122/84   04/18/17 143/90   03/24/17 122/78    Weight from Last 3 Encounters:   04/26/17 238 lb 9.6 oz (108.2 kg)   04/18/17 241 lb 3.2 oz (109.4 kg)   03/24/17 243 lb 6.4 oz (110.4 kg)              Today, you had the following     No orders found for display         Today's Medication Changes          These changes are accurate as of: 10/12/17  1:18 PM.  If you have any questions, ask your nurse or doctor.               These medicines have changed or have updated prescriptions.        Dose/Directions    metFORMIN 500 MG 24 hr tablet   Commonly known as:  GLUCOPHAGE-XR   This may have changed:    - how much to take  - when to take this   Used for:  Type 2 diabetes mellitus without complication, with long-term current use of insulin (H)   Changed by:  Lena Bedolla, [facility]        Dose:  500 mg   Take 1 tablet (500 mg) by mouth daily (with dinner)   Quantity:  120 tablet   Refills:  3            Where to get your medicines      Some of these will need a paper prescription and others can be bought over the counter.  Ask your nurse if you have questions.     You don't need a prescription for these medications     metFORMIN 500 MG 24 hr tablet                Primary Care Provider Office Phone # Fax #     Zainab Aguilar [phone]       [address] [facility]  [facility] [address]        Equal Access to Services     KASI COHENORION : Hadangelina radha murphy gold Sonadiya, wakoreyda luqadaha, qagrayta kaalmada mercedes, janna mattbrooke mata. So [facility] [phone].    ATENCIÓN: Si habla español, tiene a ramsay disposición servicios gratuitos de asistencia lingüística. Llame al [phone].    We comply with applicable federal civil rights laws and [place] laws. We do not discriminate on the basis of race, color, national origin, age, disability, sex, sexual orientation, or gender identity.            Thank you!     Thank you for choosing [facility]  for your care. Our goal is always to provide you with excellent care. Hearing back from our patients is one way we can continue to improve our services. Please take a few minutes to complete the written survey that you may receive in the mail after your visit with us. Thank you!             Your Updated Medication List - Protect others around you: Learn how to safely use, store and throw away your medicines at www.disposemymeds.org.          This list is accurate as of: 10/12/17  1:18 PM.  Always use your most recent med list.                   Brand Name Dispense Instructions for use Diagnosis    aspirin 81 MG tablet     30 tablet    Take 81 mg by mouth daily    Hyperlipidemia LDL goal <100       atorvastatin 40 MG tablet    LIPITOR    90 tablet    TAKE ONE TABLET BY MOUTH DAILY    Hyperlipidemia LDL goal <100       BASAGLAR 100 UNIT/ML injection     35 mL    Inject 36 Units Subcutaneous daily    Type 2 diabetes mellitus without complication, with long-term current use of insulin (H)       blood glucose calibration solution     1 each    Use to calibrate blood glucose monitor as directed.    Type 2 diabetes, HbA1c goal < 7% (H)       blood glucose lancets standard    no brand specified    3 Box    Use to test blood sugar 3 times daily  or as directed.    Type 2 diabetes mellitus without complication (H)       blood glucose monitoring meter device kit    no brand specified    1 kit    Use to test blood sugar 3 times daily or as directed    Type 2 diabetes mellitus without complication (H)       blood glucose monitoring test strip    no brand specified    300 strip    Use to test blood sugars 3 times daily or as directed    Type 2 diabetes mellitus without complication (H)       carBAMazepine 200 MG 12 hr capsule    CARBATROL     Take 200 mg by mouth 2 times daily        diltiazem 120 MG 24 hr capsule    CARDIZEM CD    30 capsule    Take 1 capsule (120 mg) by mouth daily    Paroxysmal supraventricular tachycardia (H)       insulin lispro 100 UNIT/ML injection    HumaLOG KWIKpen    45 mL    Inject 15 Units Subcutaneous 3 times daily (with meals) . Insulin Pens    Type 2 diabetes mellitus without complication (H)       insulin pen needle 31G X 6 MM     300 each    Use 4 daily or as directed.    Type 2 diabetes mellitus with hyperglycemia, with long-term current use of insulin (H)       liraglutide 18 MG/3ML soln    VICTOZA    9 mL    Start with 0.6 MG daily for 1 week, then 1.2 MG daily for a week, and then 1.8 MG daily    Type 2 diabetes mellitus without complication (H)       metFORMIN 500 MG 24 hr tablet    GLUCOPHAGE-XR    120 tablet    Take 1 tablet (500 mg) by mouth daily (with dinner)    Type 2 diabetes mellitus without complication, with long-term current use of insulin (H)       OCREVUS IV      Inject into the vein every 6 months        omeprazole 40 MG capsule    priLOSEC    180 capsule    TAKE ONE CAPSULE BY MOUTH TWICE DAILY    Gastroesophageal reflux disease, esophagitis presence not specified       Urea 20 % Crea cream     120 g    To dry and scaly feet twice a day as needed.    Skin cancer screening       venlafaxine 75 MG 24 hr capsule    EFFEXOR-XR     Take 75 mg by mouth daily        vitamin D 02137 UNIT capsule    ERGOCALCIFEROL      Take 50,000 Units by mouth twice a week

## 2017-10-12 NOTE — PROGRESS NOTES
S: Patient arrived to clinic with complaints of vaginal itching, irritation, and white discharge. Patient is sexually active with long-time partner. She requests to be screened for chlamydia and gonorrhea at this visit.     O: Upon speculum inspection, patient has normal female genitalia. No lesions noted. Vaginal discharge is thick and white.    A: Bacterial Vaginosis. Wet prep collected and positive for clue cells. Chlamydia and Gonorrhea swab collected. Awaiting result.     P: Patient called with result of wet prep. Prescription for metronidazole called into patient's preferred pharmacy. Patient stated understanding of medication instructions. Patient to be updated with results of the Chlamydia and Gonorrhea screening when available.     Loni Salas  Student Nurse-Midwife

## 2017-10-13 LAB
C TRACH DNA SPEC QL NAA+PROBE: ABNORMAL
N GONORRHOEA DNA SPEC QL NAA+PROBE: ABNORMAL
SPECIMEN SOURCE: ABNORMAL
SPECIMEN SOURCE: ABNORMAL

## 2017-10-13 NOTE — PROGRESS NOTES
Please call patient and notifiy that they were unable to run GC/Chlam test due to a swab error. (we are very sorry) The CNM student who was working with Pamela put both swabs in the specimen container which made the test void. Please offer patient to come in any time for a self collect GC/Chlam (can you put in orders under pamela;s name?) or a if she would like us to collect it, it could be a short CNM appointment. If we can arrange it at no additional cost to patient and free parking it would be great, as this was our error that is inconveniencing her.   Thanks,   Caro Junior, [facility] CNM

## 2017-10-16 NOTE — PROGRESS NOTES
SUBJECTIVE:   47 year old female complains of white vaginal discharge, vaginal itching and irritation. Denies abnormal vaginal bleeding or significant pelvic pain or  fever. No UTI symptoms. Denies history of known exposure to STD but would like GC/CT testing today.     Patient's last menstrual period was 09/29/2017.    OBJECTIVE:   She appears well, afebrile.  External genitalia appears normal  Wet prep and GC/CT collected with vaginal swabs    ASSESSMENT:   Bacteral vaginosis    PLAN:   GC and chlamydia genprobe swabs sent to lab.  Treatment: Flagyl 500 BID x 7 days  ROV prn if symptoms persist or worsen.  Tiffanie Taylor CNM seen with LINO Aguirre

## 2017-10-19 ENCOUNTER — OFFICE VISIT (OUTPATIENT)
Dept: MIDWIFE SERVICES | Facility: CLINIC | Age: 47
End: 2017-10-19
Payer: COMMERCIAL

## 2017-10-19 ENCOUNTER — OFFICE VISIT (OUTPATIENT)
Dept: PHARMACY | Facility: CLINIC | Age: 47
End: 2017-10-19
Payer: COMMERCIAL

## 2017-10-19 VITALS
DIASTOLIC BLOOD PRESSURE: 86 MMHG | BODY MASS INDEX: 36.61 KG/M2 | TEMPERATURE: 97.1 F | SYSTOLIC BLOOD PRESSURE: 135 MMHG | WEIGHT: 239 LBS | HEART RATE: 95 BPM

## 2017-10-19 DIAGNOSIS — Z79.4 TYPE 2 DIABETES MELLITUS WITH HYPEROSMOLARITY WITHOUT COMA, WITH LONG-TERM CURRENT USE OF INSULIN (H): ICD-10-CM

## 2017-10-19 DIAGNOSIS — Z79.4 TYPE 2 DIABETES MELLITUS WITHOUT COMPLICATION, WITH LONG-TERM CURRENT USE OF INSULIN (H): ICD-10-CM

## 2017-10-19 DIAGNOSIS — E11.00 TYPE 2 DIABETES MELLITUS WITH HYPEROSMOLARITY WITHOUT COMA, WITH LONG-TERM CURRENT USE OF INSULIN (H): ICD-10-CM

## 2017-10-19 DIAGNOSIS — E11.9 TYPE 2 DIABETES MELLITUS WITHOUT COMPLICATION, WITH LONG-TERM CURRENT USE OF INSULIN (H): ICD-10-CM

## 2017-10-19 DIAGNOSIS — N89.8 VAGINAL ITCHING: Primary | ICD-10-CM

## 2017-10-19 LAB
SPECIMEN SOURCE: NORMAL
WET PREP SPEC: NORMAL

## 2017-10-19 PROCEDURE — 87491 CHLMYD TRACH DNA AMP PROBE: CPT | Performed by: ADVANCED PRACTICE MIDWIFE

## 2017-10-19 PROCEDURE — 99606 MTMS BY PHARM EST 15 MIN: CPT | Performed by: PHARMACIST

## 2017-10-19 PROCEDURE — 87591 N.GONORRHOEAE DNA AMP PROB: CPT | Performed by: ADVANCED PRACTICE MIDWIFE

## 2017-10-19 PROCEDURE — 87210 SMEAR WET MOUNT SALINE/INK: CPT | Performed by: ADVANCED PRACTICE MIDWIFE

## 2017-10-19 PROCEDURE — 99207 ZZC NON-BILLABLE SERV PER CHARTING: CPT | Performed by: ADVANCED PRACTICE MIDWIFE

## 2017-10-19 RX ORDER — INSULIN GLARGINE 100 [IU]/ML
40-44 INJECTION, SOLUTION SUBCUTANEOUS DAILY
Qty: 35 ML | Refills: 3
Start: 2017-10-19 | End: 2017-11-30

## 2017-10-19 RX ORDER — FLUCONAZOLE 150 MG/1
150 TABLET ORAL ONCE
Qty: 1 TABLET | Refills: 1 | Status: SHIPPED | OUTPATIENT
Start: 2017-10-19 | End: 2018-01-02

## 2017-10-19 NOTE — NURSING NOTE
"Chief Complaint   Patient presents with     STD       Initial /86  Pulse 95  Temp 97.1  F (36.2  C) (Oral)  Wt 239 lb (108.4 kg)  LMP 2017  BMI 36.61 kg/m2 Estimated body mass index is 36.61 kg/(m^2) as calculated from the following:    Height as of 10/12/17: 5' 7.75\" (1.721 m).    Weight as of this encounter: 239 lb (108.4 kg).  BP completed using cuff size: regular        The following HM Due: NONE      The following patient reported/Care Every where data was sent to:  P ABSTRACT QUALITY INITIATIVES [10360]  na     n/a             "

## 2017-10-19 NOTE — PROGRESS NOTES
"SUBJECTIVE/OBJECTIVE:                Maranda Mallory is a 47 year old female coming in for a follow-up visit for Medication Therapy Management.  She was referred to me from Dr. Aguilar.     Chief Complaint: Follow up from our visit on 10/12/17.  Here to f/up on diabetes management.    Tobacco: History of tobacco dependence - quit 2005  Alcohol: Less than 1 beverage / month    Social history - her 17 yo daughter ran away and has been missing for a month, so this has understandably been very stressful for her.      Medication Adherence: no issues reported, but cost remains a concern.  She hasn't had a chance to call re: patient assistance programs yet.    Diabetes:  Pt currently taking metformin ER 500mg daily (diarrhea seems to be better with this dosing), Basaglar 36 units daily and Humalog 15 units TID (unless she skips a meal).  She notes she's been more consistent taking her insulin.  She has been eating breakfast more regularly, but continues \"grazing\" during the day at work.  Afternoon remains the hardest for her in terms of not eating candy/chips.    SMBG: Occasionally.   Ranges (patient reported): AM: 200-260mg/dL; PM (after work): 200-300mg/dL  Patient is not experiencing hypoglycemia  Recent symptoms of high blood sugar? Sweaty, faint feeling, feels \"sick\"  Eye exam: up to date  Foot exam: up to date  Microalbumin is not < 30 mg/g. Pt is not taking an ACEi/ARB.  Aspirin: Taking 81mg daily and denies side effects    Current labs include:  BP Readings from Last 3 Encounters:   10/12/17 127/88   04/26/17 122/84   04/18/17 143/90     Lab Results   Component Value Date    A1C 11.1 09/16/2017   .  Lab Results   Component Value Date    CHOL 173 09/16/2017     Lab Results   Component Value Date    TRIG 384 09/16/2017     Lab Results   Component Value Date    HDL 43 09/16/2017     Lab Results   Component Value Date    LDL 53 09/16/2017       Liver Function Studies -   Recent Labs   Lab Test  09/16/17   0907   PROTTOTAL "  7.5   ALBUMIN  3.6   BILITOTAL  0.3   ALKPHOS  117   AST  17   ALT  27       Lab Results   Component Value Date    UCRR 85 09/16/2017    MICROL 31 09/16/2017    UMALCR 36.88 (H) 09/16/2017       Last Basic Metabolic Panel:  Lab Results   Component Value Date     09/16/2017      Lab Results   Component Value Date    POTASSIUM 3.4 09/16/2017     Lab Results   Component Value Date    CHLORIDE 99 09/16/2017     Lab Results   Component Value Date    BUN 6 09/16/2017     Lab Results   Component Value Date    CR 0.43 09/16/2017     GFR Estimate   Date Value Ref Range Status   09/16/2017 >90 >60 mL/min/1.7m2 Final     Comment:     Non  GFR Calc   06/13/2017 108 >59 mL/min/1.73 Final   03/24/2017 >90  Non  GFR Calc   >60 mL/min/1.7m2 Final       TSH   Date Value Ref Range Status   01/10/2017 1.27 0.40 - 4.00 mU/L Final   ]    Most Recent Immunizations   Administered Date(s) Administered     Influenza (H1N1) 01/07/2010     Influenza (IIV3) 10/25/2014     Influenza Vaccine IM 3yrs+ 4 Valent IIV4 09/28/2016     Pneumococcal 23 valent 09/25/2009     TD (ADULT, 7+) 10/28/2002     TDAP Vaccine (Boostrix) 03/20/2012       ASSESSMENT:              Current medications were reviewed today as discussed above.      Medication Adherence: no issues identified    Diabetes: Needs Improvement. Patient is not meeting A1c goal of < 7%. Self monitoring of blood glucose is not at goal of fasting  mg/dL and post prandial < 180 mg/dL.  At this point she's taking more short acting insulin than long acting insulin throughout the day - will aim to get closer to a 50/50 ratio, so will increase Basaglar dose.      PLAN:                1.  Increase Basaglar to 40 units daily.  If your sugars are still high between now and Sunday - go ahead and increase Basaglar to 44 units starting on Sunday.    I spent 15 minutes with this patient today. All changes were made via collaborative practice agreement with Jeff  Zainab Espinoza. A copy of the visit note was provided to the patient's primary care provider.     Will follow up with weekly BG reports via [employer].  She doesn't have another day off of work for a month.    The patient was given a summary of these recommendations as an after visit summary.    Lena Bedolla, PharmD, [facility]  Medication Therapy Management Provider  Pager: [phone]

## 2017-10-19 NOTE — MR AVS SNAPSHOT
After Visit Summary   10/19/2017    Maranda Mallory    MRN: [id number]           Patient Information     Date Of Birth          1970        Visit Information        Provider Department      10/19/2017 3:15 PM Jenny Melendrez CNM [facility]        Today's Diagnoses     Vaginal itching    -  1    Type 2 diabetes mellitus with hyperosmolarity without coma, with long-term current use of insulin (H)           Follow-ups after your visit        Follow-up notes from your care team     Return if symptoms worsen or fail to improve.      Your next 10 appointments already scheduled     Oct 23, 2017  6:00 PM CDT   Office Visit with Zainab Aguilar MD   [facility] ([place] [id number] [facility] [address] [phone]           Bring a current list of meds and any records pertaining to this visit. For Physicals, please bring immunization records and any forms needing to be filled out. Please arrive 10 minutes early to complete paperwork.              Who to contact     If you have questions or need follow up information about today's clinic visit or your schedule please contact [facility] directly at [phone].  Normal or non-critical lab and imaging results will be communicated to you by MyChart, letter or phone within 4 business days after the clinic has received the results. If you do not hear from us within 7 days, please contact the clinic through [employer] or phone. If you have a critical or abnormal lab result, we will notify you by phone as soon as possible.  Submit refill requests through [employer] or call your pharmacy and they will forward the refill request to us. Please allow 3 business days for your refill to be completed.          Additional Information About Your Visit        MyChart Information     [employer] gives you secure access to your electronic health record. If you see a primary care provider, you can  also send messages to your care team and make appointments. If you have questions, please call your primary care clinic.  If you do not have a primary care provider, please call [phone] and they will assist you.        Care EveryWhere ID     This is your Care EveryWhere ID. This could be used by other organizations to access your [place] medical records  [id number]        Your Vitals Were     Pulse Temperature Last Period BMI (Body Mass Index)          95 97.1  F (36.2  C) (Oral) 09/29/2017 36.61 kg/m2         Blood Pressure from Last 3 Encounters:   10/19/17 135/86   10/12/17 127/88   04/26/17 122/84    Weight from Last 3 Encounters:   10/19/17 239 lb (108.4 kg)   10/12/17 237 lb 3.2 oz (107.6 kg)   04/26/17 238 lb 9.6 oz (108.2 kg)              We Performed the Following     CHLAMYDIA TRACHOMATIS PCR     NEISSERIA GONORRHOEA PCR     Wet prep          Today's Medication Changes          These changes are accurate as of: 10/19/17  3:40 PM.  If you have any questions, ask your nurse or doctor.               Start taking these medicines.        Dose/Directions    fluconazole 150 MG tablet   Commonly known as:  DIFLUCAN   Used for:  Vaginal itching   Started by:  Jenny Melendrez CNM        Dose:  150 mg   Take 1 tablet (150 mg) by mouth once for 1 dose   Quantity:  1 tablet   Refills:  1       Miconazole Nitrate Applicator 100 & 2 MG-% (9GM) Kit   Used for:  Vaginal itching   Started by:  Jenny Melendrez CNM        Dose:  1 applicator   Place 1 applicator vaginally At Bedtime for 7 days   Quantity:  1 kit   Refills:  3         These medicines have changed or have updated prescriptions.        Dose/Directions    BASAGLAR 100 UNIT/ML injection   This may have changed:  how much to take   Used for:  Type 2 diabetes mellitus without complication, with long-term current use of insulin (H)   Changed by:  Lena Bedolla, [facility]        Dose:  40-44 Units   Inject 40-44 Units Subcutaneous daily   Quantity:   35 mL   Refills:  3            Where to get your medicines      These medications were sent to [facility] [address] [address] [facility]  [address], [facility] [address]     Phone:  [phone]     fluconazole 150 MG tablet    Miconazole Nitrate Applicator 100 & 2 MG-% (9GM) Kit         Some of these will need a paper prescription and others can be bought over the counter.  Ask your nurse if you have questions.     You don't need a prescription for these medications     BASAGLAR 100 UNIT/ML injection                Primary Care Provider Office Phone # Fax #    Zainab Aguilar [phone] [address] [facility]  [facility] [address]        Equal Access to Services     KASI PERDUE : Hadii aad ku hadasho Soomaali, waaxda luqadaha, qaybta kaalmada adeegyada, janna niño . So [facility] [phone].    ATENCIÓN: Si habla español, tiene a ramsay disposición servicios gratuitos de asistencia lingüística. [facility] [phone].    We comply with applicable federal civil rights laws and [place] laws. We do not discriminate on the basis of race, color, national origin, age, disability, sex, sexual orientation, or gender identity.            Thank you!     Thank you for choosing [facility]  for your care. Our goal is always to provide you with excellent care. Hearing back from our patients is one way we can continue to improve our services. Please take a few minutes to complete the written survey that you may receive in the mail after your visit with us. Thank you!             Your Updated Medication List - Protect others around you: Learn how to safely use, store and throw away your medicines at www.disposemymeds.org.          This list is accurate as of: 10/19/17  3:40 PM.  Always use your most recent med list.                   Brand Name Dispense Instructions for use Diagnosis    aspirin 81  MG tablet     30 tablet    Take 81 mg by mouth daily    Hyperlipidemia LDL goal <100       atorvastatin 40 MG tablet    LIPITOR    90 tablet    TAKE ONE TABLET BY MOUTH DAILY    Hyperlipidemia LDL goal <100       BASAGLAR 100 UNIT/ML injection     35 mL    Inject 40-44 Units Subcutaneous daily    Type 2 diabetes mellitus without complication, with long-term current use of insulin (H)       blood glucose calibration solution     1 each    Use to calibrate blood glucose monitor as directed.    Type 2 diabetes, HbA1c goal < 7% (H)       blood glucose lancets standard    no brand specified    3 Box    Use to test blood sugar 3 times daily or as directed.    Type 2 diabetes mellitus without complication (H)       blood glucose monitoring meter device kit    no brand specified    1 kit    Use to test blood sugar 3 times daily or as directed    Type 2 diabetes mellitus without complication (H)       blood glucose monitoring test strip    no brand specified    300 strip    Use to test blood sugars 3 times daily or as directed    Type 2 diabetes mellitus without complication (H)       carBAMazepine 200 MG 12 hr capsule    CARBATROL     Take 200 mg by mouth 2 times daily        diltiazem 120 MG 24 hr capsule    CARDIZEM CD    30 capsule    Take 1 capsule (120 mg) by mouth daily    Paroxysmal supraventricular tachycardia (H)       fluconazole 150 MG tablet    DIFLUCAN    1 tablet    Take 1 tablet (150 mg) by mouth once for 1 dose    Vaginal itching       insulin lispro 100 UNIT/ML injection    HumaLOG KWIKpen    45 mL    Inject 15 Units Subcutaneous 3 times daily (with meals) . Insulin Pens    Type 2 diabetes mellitus without complication (H)       insulin pen needle 31G X 6 MM     300 each    Use 4 daily or as directed.    Type 2 diabetes mellitus with hyperglycemia, with long-term current use of insulin (H)       liraglutide 18 MG/3ML soln    VICTOZA    9 mL    Start with 0.6 MG daily for 1 week, then 1.2 MG daily for a  week, and then 1.8 MG daily    Type 2 diabetes mellitus without complication (H)       metFORMIN 500 MG 24 hr tablet    GLUCOPHAGE-XR    120 tablet    Take 1 tablet (500 mg) by mouth daily (with dinner)    Type 2 diabetes mellitus without complication, with long-term current use of insulin (H)       metroNIDAZOLE 500 MG tablet    FLAGYL    14 tablet    Take 1 tablet (500 mg) by mouth 2 times daily    BV (bacterial vaginosis)       Miconazole Nitrate Applicator 100 & 2 MG-% (9GM) Kit     1 kit    Place 1 applicator vaginally At Bedtime for 7 days    Vaginal itching       OCREVUS IV      Inject into the vein every 6 months        omeprazole 40 MG capsule    priLOSEC    180 capsule    TAKE ONE CAPSULE BY MOUTH TWICE DAILY    Gastroesophageal reflux disease, esophagitis presence not specified       Urea 20 % Crea cream     120 g    To dry and scaly feet twice a day as needed.    Skin cancer screening       venlafaxine 75 MG 24 hr capsule    EFFEXOR-XR     Take 75 mg by mouth daily        vitamin D 59242 UNIT capsule    ERGOCALCIFEROL     Take 50,000 Units by mouth twice a week

## 2017-10-19 NOTE — PATIENT INSTRUCTIONS
Recommendations from today's MTM visit:                                                    MTM (medication therapy management) is a service provided by a clinical pharmacist designed to help you get the most of out of your medicines.   Today we reviewed what your medicines are for, how to know if they are working, that your medicines are safe and how to make your medicine regimen as easy as possible.     1.  Increase Basaglar to 40 units daily.  If your sugars are still high between now and Sunday - go ahead and increase Basaglar to 44 units starting on Sunday.    Next MTM visit:  Please send me blood sugar readings via [email or web address] every week    To schedule another MTM appointment, please call the clinic directly or you may call the MTM scheduling line at [phone] or toll-free at [phone].     My Clinical Pharmacist's contact information:                                                      It was a pleasure seeing you today!  Please feel free to contact me with any questions or concerns you have.      Lena Bedolla, [place], [facility]  Medication Therapy Management Provider  Pager: [phone]     You may receive a survey about the MTM services you received.  I would appreciate your feedback to help me serve you better in the future. Please fill it out and return it when you can. Your comments will be anonymous.

## 2017-10-19 NOTE — PROGRESS NOTES
SUBJECTIVE:  Maranda Mallory is a 47 year old female who presents with vaginal itching and for a GC/CT which was incorrectly collected/packaged     Patient's last menstrual period was 09/29/2017.   Vaginal/vulvar symptoms: local irritation and vulvar itching.  Other associated symptoms: none.  Predisposing factors: diabetes and antibiotics  Hx of previous vaginitis: rare  Sexually active: not discussed       Patient Active Problem List   Diagnosis     Obesity     Hyperlipidemia LDL goal <100     Cholelithiasis without obstruction     Non-alcoholic fatty liver disease     GERD (gastroesophageal reflux disease)     Hypertriglyceridemia     Esophageal dysmotility     Sliding hiatal hernia     Heart murmur     Recurrent UTI     Hypovitaminosis D     Health Care Home (Not Active)      Lumbar radiculopathy     KAITLIN (generalized anxiety disorder)     Type 2 diabetes mellitus without complication (H)     Paroxysmal supraventricular tachycardia (H)     Gastroesophageal reflux disease, esophagitis presence not specified     Atypical chest pain     HSIL (high grade squamous intraepithelial lesion) on Pap smear of cervix     Morbid obesity due to excess calories (H)     Pain of left upper extremity     Other abnormal cytological finding of specimen from cervix     Papanicolaou smear of cervix with low grade squamous intraepithelial lesion (LGSIL)     Physical deconditioning     SOB (shortness of breath)     Insomnia due to other mental disorder     Anxiety     Persistent disorder of initiating or maintaining sleep     Chronic daily headache     Excessive daytime sleepiness     MS (multiple sclerosis) (H)     Urinary frequency     Hypomagnesemia     Hypokalemia     Generalized hyperhidrosis     Elevation of level of transaminase or lactic acid dehydrogenase (LDH)     Anemia due to other cause     Type 2 diabetes mellitus with hyperosmolarity without coma, with long-term current use of insulin (H)     Other iron deficiency anemia      BMI 37.0-37.9, adult     Past Medical History:   Diagnosis Date     Acne      Allergic rhinitis 5/19/2009    Skin Tests- Cat/Dog/DM/T/G/W     Allergies      Anxiety      Bacterial vaginosis 9/13/2011    Chronic, sometimes with yeast  3/20/12: + BV and + yeast--treated with Metronidazole X 10 days and Diflucan X 3 doses to be taken afterward.  JMS      Depressive disorder, not elsewhere classified 1995    Sees a therapist but no meds     Esophageal dysmotility 1/29/2014    Goes to the [facility] - will get food stuck in throat and will need EGD to remove it     Gestational diabetes      H/O colposcopy with cervical biopsy 3/7/16    LSIL, can't exclude HSIL     Heart murmur 1/29/2014     High risk HPV infection 2/2016    LSIL +HPV 16     Hypertension     borderline     Mixed hyperlipidemia     Takes Lipitor     Sliding hiatal hernia 1/29/2014     Type 2 diabetes mellitus (H)     diet controlled     Type II or unspecified type diabetes mellitus without mention of complication, not stated as uncontrolled      Urinary tract infection, site not specified     Recurrent UTI's couple times a year     Vitamin D deficiency disease 5/23/2013     Past Surgical History:   Procedure Laterality Date     DILATE ESOPHAGUS  3/20/2013    Procedure: DILATE ESOPHAGUS;;  Surgeon: Anthony Watts MD;  Location:  GI     ESOPHAGEAL MOTILITY STUDY       ESOPHAGOSCOPY, GASTROSCOPY, DUODENOSCOPY (EGD), COMBINED  3/20/2013    Procedure: COMBINED ESOPHAGOSCOPY, GASTROSCOPY, DUODENOSCOPY (EGD), BIOPSY SINGLE OR MULTIPLE;;  Surgeon: Anthony Watts MD;  Location:  GI     HC DILATION/CURETTAGE DIAG/THER NON OB      D & C x5 with pregnancies     HC ESOPHAGOSCOPY WITH FOREIGN BODY REMOVAL       LEEP TX, CERVICAL  4/12/16    Negative     TUBAL LIGATION       Social History     Social History     Marital status: Single     Spouse name: N/A     Number of children: 5     Years of education: 13     Occupational History     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       [employer]     Social History Main Topics     Smoking status: Former Smoker     Packs/day: 0.30     Years: 5.00     Types: Cigarettes     Quit date: 1/1/2005     Smokeless tobacco: Never Used      Comment: 1/2010     Alcohol use 1.2 oz/week     2 Standard drinks or equivalent per week      Comment: occ     Drug use: No     Sexual activity: Yes     Partners: Male     Birth control/ protection: Female Surgical      Comment: Tubal     Other Topics Concern     Parent/Sibling W/ Cabg, Mi Or Angioplasty Before 65f 55m? Yes     Mom , grandma and great grandma     Social History Narrative    Caffeine intake/servings daily - 1    Calcium intake/servings daily - 3    Exercise 0 times weekly - describe NA    Sunscreen used - No    Seatbelts used - Yes    Guns stored in the home - No    Self Breast Exam - Yes    Pap test up to date -  Yes    Eye exam up to date -  Yes    Dental exam up to date -  Yes    DEXA scan up to date -  Not Applicable    Flex Sig/Colonoscopy up to date -  Not Applicable    Mammography up to date -  Not Applicable    Immunizations reviewed and up to date - Yes    Abuse: Current or Past (Physical, Sexual or Emotional) - No    Do you feel safe in your environment - Yes    Do you cope well with stress - Yes    Do you suffer from insomnia - No    Last updated by: Maame Mclaughlin  3/15/2011                       OBJECTIVE:  Patient appears well, vital signs normal. Abdomen normal, soft   without tenderness, guarding, mass or organomegaly. No inguinal   adenopathy or CVA tenderness.  Very red and irritated vulva  WET PREP: Not done and no trichomonas, monilia, or clue cells   GC and Chlamydia offered and collected    ASSESSMENT:   Presumed Yeast   (L29.8) Vaginal itching  (primary encounter diagnosis)  Comment:Presumed Yeast - no yeast found on Wet Prep  Plan: Wet prep, NEISSERIA GONORRHOEA PCR, CHLAMYDIA         TRACHOMATIS PCR, fluconazole (DIFLUCAN) 150 MG         tablet, Miconazole Nitrate Applicator  100 & 2         MG-% (9GM) KIT    (E11.00,  Z79.4) Type 2 diabetes mellitus with hyperosmolarity without coma, with long-term current use of insulin (H)  Comment: High risk for yeast infections - discussed     PLAN:  Diflucan ordered as requested - this has worked in the past and she does not have extra money for Monistat 7  Highly recommended Monistat 7 for treatment and comfort - ordered but she doesn't think her insurance covers it.    Discussed that yeast is very common with uncontrolled blood sugars and after taking antibiotics  Discussed coconut oil as need and probiotics daily to help with vaginal health.      Jenny Melendrez    Per the previous provider - this appt should not be charged.  GC/CT was incorrectly packaged in error    Level of service No Charge

## 2017-10-20 LAB
C TRACH DNA SPEC QL NAA+PROBE: NEGATIVE
N GONORRHOEA DNA SPEC QL NAA+PROBE: NEGATIVE
SPECIMEN SOURCE: NORMAL
SPECIMEN SOURCE: NORMAL

## 2017-10-23 ENCOUNTER — OFFICE VISIT (OUTPATIENT)
Dept: INTERNAL MEDICINE | Facility: CLINIC | Age: 47
End: 2017-10-23
Payer: COMMERCIAL

## 2017-10-23 VITALS
HEART RATE: 101 BPM | DIASTOLIC BLOOD PRESSURE: 76 MMHG | BODY MASS INDEX: 36.91 KG/M2 | OXYGEN SATURATION: 96 % | WEIGHT: 241 LBS | SYSTOLIC BLOOD PRESSURE: 128 MMHG | TEMPERATURE: 97.1 F

## 2017-10-23 DIAGNOSIS — G35 MS (MULTIPLE SCLEROSIS) (H): ICD-10-CM

## 2017-10-23 DIAGNOSIS — E83.51 HYPOCALCEMIA: ICD-10-CM

## 2017-10-23 DIAGNOSIS — Z79.4 TYPE 2 DIABETES MELLITUS WITH HYPEROSMOLARITY WITHOUT COMA, WITH LONG-TERM CURRENT USE OF INSULIN (H): Primary | ICD-10-CM

## 2017-10-23 DIAGNOSIS — E11.00 TYPE 2 DIABETES MELLITUS WITH HYPEROSMOLARITY WITHOUT COMA, WITH LONG-TERM CURRENT USE OF INSULIN (H): Primary | ICD-10-CM

## 2017-10-23 DIAGNOSIS — E61.1 IRON DEFICIENCY: ICD-10-CM

## 2017-10-23 PROCEDURE — 99214 OFFICE O/P EST MOD 30 MIN: CPT | Performed by: INTERNAL MEDICINE

## 2017-10-23 RX ORDER — MULTIPLE VITAMINS W/ MINERALS TAB 9MG-400MCG
1 TAB ORAL DAILY
Qty: 100 TABLET | Refills: 3 | COMMUNITY
Start: 2017-10-23 | End: 2017-12-20

## 2017-10-23 ASSESSMENT — PAIN SCALES - GENERAL: PAINLEVEL: NO PAIN (0)

## 2017-10-23 NOTE — MR AVS SNAPSHOT
After Visit Summary   10/23/2017    Maranda Mallory    MRN: [id number]           Patient Information     Date Of Birth          1970        Visit Information        Provider Department      10/23/2017 6:00 PM Zainab Aguilar MD [facility]        Today's Diagnoses     Type 2 diabetes mellitus with hyperosmolarity without coma, with long-term current use of insulin (H)    -  1    MS (multiple sclerosis) (H)        BMI 36.0-36.9,adult        Iron deficiency        Hypocalcemia          Care Instructions    Schedule bariatric surgery consult to discuss your options.     Restart your iron supplement once a day.  Start taking a multivitamin.     Try splitting your Basaglar dose to 20 units twice a day and see if your dizziness resolves.    Call and ask your insurance and ask which one is covered: Victoza, Trulicity, or Bydureon?    Follow up with me in 1 month.       [facility]    If you have any questions regarding to your visit please contact your care team:     Team Pink:   Clinic Hours Telephone Number   Internal Medicine:  Dr. Zainab Geller, NP       7am-7pm  Monday - Thursday   7am-5pm  Fridays  [phone]  (Appointment scheduling available 24/7)    Questions about your visit?  Team Line  [phone]   Urgent Care - [place] - 11am-9pm Monday-Friday Saturday-Sunday- 9am-5pm   [place] - 5pm-9pm Monday-Friday Saturday-Sunday- 9am-5pm  [phone] - [place]   [phone] - [place]       What options do I have for visits at the clinic other than the traditional office visit?  To expand how we care for you, many of our providers are utilizing electronic visits (e-visits) and telephone visits, when medically appropriate, for interactions with their patients rather than a visit in the clinic.   We also offer nurse visits for many medical concerns. Just like any other service, we will bill your  insurance company for this type of visit based on time spent on the phone with your provider. Not all insurance companies cover these visits. Please check with your medical insurance if this type of visit is covered. You will be responsible for any charges that are not paid by your insurance.      E-visits via OodleharVoltServer:  generally incur a $35.00 fee.  Telephone visits:  Time spent on the phone: *charged based on time that is spent on the phone in increments of 10 minutes. Estimated cost:   5-10 mins $30.00   11-20 mins. $59.00   21-30 mins. $85.00   Use EnTouch Controls (secure email communication and access to your chart) to send your primary care provider a message or make an appointment. Ask someone on your Team how to sign up for EnTouch Controls.    For a Price Quote for your services, please call our Jogg Price Line at [phone].    As always, Thank you for trusting us with your health care needs!    Discharged by Patrica MAYFIELD CMA ([facility])            Follow-ups after your visit        Additional Services     BARIATRIC ADULT REFERRAL       Your provider has referred you to: G: [facility] [place] [phone]  [email or web address] [phone]. [email or web address]    Please be aware that coverage of these services is subject to the terms and limitations of your health insurance plan.  Call member services at your health plan with any benefit or coverage questions.      Please bring the following with you to your appointment:      (1) List of current medications   (2) This referral request   (3) Any documents/labs given to you for this referral                  Who to contact     If you have questions or need follow up information about today's clinic visit or your schedule please  contact [facility] directly at [phone].  Normal or non-critical lab and imaging results will be communicated to you by MyChart, letter or phone within 4 business days after the clinic has received the results. If you do not hear from us within 7 days, please contact the clinic through [email or web address] or phone. If you have a critical or abnormal lab result, we will notify you by phone as soon as possible.  Submit refill requests through Egnyte or call your pharmacy and they will forward the refill request to us. Please allow 3 business days for your refill to be completed.          Additional Information About Your Visit        [email or web address] (CircleMe) Information     Egnyte gives you secure access to your electronic health record. If you see a primary care provider, you can also send messages to your care team and make appointments. If you have questions, please call your primary care clinic.  If you do not have a primary care provider, please call [phone] and they will assist you.        Care EveryWhere ID     This is your Care EveryWhere ID. This could be used by other organizations to access your [facility] medical records  [id number]        Your Vitals Were     Pulse Temperature Last Period Pulse Oximetry BMI (Body Mass Index)       101 97.1  F (36.2  C) (Oral) 09/29/2017 96% 36.91 kg/m2        Blood Pressure from Last 3 Encounters:   10/23/17 128/76   10/19/17 135/86   10/12/17 127/88    Weight from Last 3 Encounters:   10/23/17 241 lb (109.3 kg)   10/19/17 239 lb (108.4 kg)   10/12/17 237 lb 3.2 oz (107.6 kg)              We Performed the Following     BARIATRIC ADULT REFERRAL          Today's Medication Changes          These changes are accurate as of: 10/23/17  6:29 PM.  If you have any questions, ask your nurse or doctor.               Stop taking these medicines if you haven't already. Please contact your care team if you have questions.     liraglutide 18 MG/3ML soln   Commonly known as:  VICTOZA    Stopped by:  Zainab Aguilar MD           metroNIDAZOLE 500 MG tablet   Commonly known as:  FLAGYL   Stopped by:  Zainab Aguilar MD           Miconazole Nitrate Applicator 100 & 2 MG-% (9GM) Kit   Stopped by:  Zainab Aguilar MD                Where to get your medicines      [facility] [address] [place], [place] [address] AT [facility]  [address], [facility] [address]     Phone:  [phone]     insulin lispro 100 UNIT/ML injection                Primary Care Provider Office Phone # Fax #    Zainab Aguilar [phone]       [address] [place] [address]        Equal Access to Services     [facility] Center[facility]ION : Hadii radha ku hadasho Soomaali, waaxda luqadaha, qaybta kaalmada adeegyada, waxay idiin hayaan charles niño . So [facility] [phone].    ATENCIÓN: Si habla español, tiene a ramsay disposición servicios gratuitos de asistencia lingüística. Llame al [phone].    We comply with applicable federal civil rights laws and [place] laws. We do not discriminate on the basis of race, color, national origin, age, disability, sex, sexual orientation, or gender identity.            Thank you!     Thank you for choosing [facility]  for your care. Our goal is always to provide you with excellent care. Hearing back from our patients is one way we can continue to improve our services. Please take a few minutes to complete the written survey that you may receive in the mail after your visit with us. Thank you!             Your Updated Medication List - Protect others around you: Learn how to safely use, store and throw away your medicines at www.disposemymeds.org.          This list is accurate as of: 10/23/17  6:29 PM.  Always use your most recent med list.                   Brand Name Dispense Instructions for use Diagnosis    aspirin 81 MG tablet     30 tablet    Take 81 mg by mouth  daily    Hyperlipidemia LDL goal <100       atorvastatin 40 MG tablet    LIPITOR    90 tablet    TAKE ONE TABLET BY MOUTH DAILY    Hyperlipidemia LDL goal <100       BASAGLAR 100 UNIT/ML injection     35 mL    Inject 40-44 Units Subcutaneous daily    Type 2 diabetes mellitus without complication, with long-term current use of insulin (H)       blood glucose calibration solution     1 each    Use to calibrate blood glucose monitor as directed.    Type 2 diabetes, HbA1c goal < 7% (H)       blood glucose lancets standard    no brand specified    3 Box    Use to test blood sugar 3 times daily or as directed.    Type 2 diabetes mellitus without complication (H)       blood glucose monitoring meter device kit    no brand specified    1 kit    Use to test blood sugar 3 times daily or as directed    Type 2 diabetes mellitus without complication (H)       blood glucose monitoring test strip    no brand specified    300 strip    Use to test blood sugars 3 times daily or as directed    Type 2 diabetes mellitus without complication (H)       carBAMazepine 200 MG 12 hr capsule    CARBATROL     Take 200 mg by mouth 2 times daily        diltiazem 120 MG 24 hr capsule    CARDIZEM CD    30 capsule    Take 1 capsule (120 mg) by mouth daily    Paroxysmal supraventricular tachycardia (H)       ferrous sulfate 142 (45 FE) MG Tbcr    SLO-FE    30 tablet    Take 1 tablet (142 mg) by mouth daily        insulin lispro 100 UNIT/ML injection    HumaLOG KWIKpen    45 mL    Inject 15 Units Subcutaneous 3 times daily (with meals) . Insulin Pens    Type 2 diabetes mellitus with hyperosmolarity without coma, with long-term current use of insulin (H)       insulin pen needle 31G X 6 MM     300 each    Use 4 daily or as directed.    Type 2 diabetes mellitus with hyperglycemia, with long-term current use of insulin (H)       metFORMIN 500 MG 24 hr tablet    GLUCOPHAGE-XR    120 tablet    Take 1 tablet (500 mg) by mouth daily (with dinner)    Type 2  diabetes mellitus without complication, with long-term current use of insulin (H)       Multi-vitamin Tabs tablet     100 tablet    Take 1 tablet by mouth daily        OCREVUS IV      Inject into the vein every 6 months        omeprazole 40 MG capsule    priLOSEC    180 capsule    TAKE ONE CAPSULE BY MOUTH TWICE DAILY    Gastroesophageal reflux disease, esophagitis presence not specified       Urea 20 % Crea cream     120 g    To dry and scaly feet twice a day as needed.    Skin cancer screening       venlafaxine 75 MG 24 hr capsule    EFFEXOR-XR     Take 75 mg by mouth daily        vitamin D 00282 UNIT capsule    ERGOCALCIFEROL     Take 50,000 Units by mouth twice a week

## 2017-10-23 NOTE — PATIENT INSTRUCTIONS
Schedule bariatric surgery consult to discuss your options.     Restart your iron supplement once a day.  Start taking a multivitamin.     Try splitting your Basaglar dose to 20 units twice a day and see if your dizziness resolves.    Call and ask your insurance and ask which one is covered: Victoza, Trulicity, or Bydureon?    Follow up with me in 1 month.       [facility]    If you have any questions regarding to your visit please contact your care team:     Team Pink:   Clinic Hours Telephone Number   Internal Medicine:  Dr. Zainab Geller, NP       7am-7pm  Monday - Thursday   7am-5pm  Fridays  [phone]  (Appointment scheduling available 24/7)    Questions about your visit?  Team Line  [phone]   [facility] [place] [facility] - 11am-9pm Monday-Friday Saturday-Sunday- 9am-5pm   [place] - 5pm-9pm Monday-Friday Saturday-Sunday- 9am-5pm  [phone] - [facility]  [phone] - [place]       What options do I have for visits at the clinic other than the traditional office visit?  To expand how we care for you, many of our providers are utilizing electronic visits (e-visits) and telephone visits, when medically appropriate, for interactions with their patients rather than a visit in the clinic.   We also offer nurse visits for many medical concerns. Just like any other service, we will bill your insurance company for this type of visit based on time spent on the phone with your provider. Not all insurance companies cover these visits. Please check with your medical insurance if this type of visit is covered. You will be responsible for any charges that are not paid by your insurance.      E-visits via ShieldEffect:  generally incur a $35.00 fee.  Telephone visits:  Time spent on the phone: *charged based on time that is spent on the phone in increments of 10 minutes. Estimated cost:   5-10 mins $30.00   11-20 mins. $59.00   21-30 mins.  $85.00   Use CircuitLabt (secure email communication and access to your chart) to send your primary care provider a message or make an appointment. Ask someone on your Team how to sign up for [email or web address].    For a Price Quote for your services, please call our Consumer Price Line at [phone].    As always, Thank you for trusting us with your health care needs!    Discharged by Patrica MAYFIELD CMA ([facility])

## 2017-10-23 NOTE — PROGRESS NOTES
INTERNAL MEDICINE  SUBJECTIVE:   Maranda Mallory is a 47 year old female who presents to clinic today for the following health issues:    Patient presents to clinic today for diabetes check.     Lab Results   Component Value Date    A1C 11.1 09/16/2017    A1C 9.0 03/24/2017    A1C 9.2 01/10/2017    A1C 9.4 01/07/2017    A1C 9.0 09/17/2016       Anxiety - Her daughter has been missing for a month now. Her dad just had a heart attack. She is not sleeping much right now. Work offers a distraction from the chaos of life. It is stressful in its own way but not compared to her family stressors. At work she does not worry but after being calm all day she can feel herself having anxiety attacks on the way home. Despite all this she feels her anxiety is well controlled, Effexor is helping.    MS - In September she did her first ocrelizumab injection and has been without side effect. She met with her neurologist Thursday to check in since the injection. Her MS symptoms have been managed thus far on the injection. Injections are once every 6 months.     Diabetes - Her lowest blood sugar is fasting in the am- 220. Patient met with Lena twice. She increased her Basaglar dose to 40 units. Patient notes she feels weird on the increased dose. Today she only took 36 units. She is not on Victoza without insurance coverage. She is doing Humalog injections three times a day.    She has random episodes of sweating throughout the day. Lena recommended she check her blood glucose during these episodes. She has yet to do so.    Additional Notes  She is not currently taking an iron supplement. Her periods are regular. Denies blood or black tarry stools.  Breathing is stable.      Wt Readings from Last 5 Encounters:   10/23/17 109.3 kg (241 lb)   10/19/17 108.4 kg (239 lb)   10/12/17 107.6 kg (237 lb 3.2 oz)   04/26/17 108.2 kg (238 lb 9.6 oz)   04/18/17 109.4 kg (241 lb 3.2 oz)     Problem list and histories reviewed & adjusted, as  indicated.  Additional history: as documented    Labs reviewed in EPIC  Reviewed and updated as needed this visit by clinical staff  Tobacco  Allergies  Meds  Med Hx  Surg Hx  Fam Hx  Soc Hx      Reviewed and updated as needed this visit by Provider         ROS:  C: NEGATIVE for fever, chills, change in weight  R: NEGATIVE for significant cough or SOB  CV: NEGATIVE for chest pain, palpitations or peripheral edema  GI: NEGATIVE for nausea, abdominal pain, heartburn, or change in bowel habits  : NEGATIVE for frequency, dysuria, or hematuria  M: NEGATIVE for significant arthralgias or myalgia  N: NEGATIVE for weakness, dizziness or paresthesias  P: NEGATIVE for changes in mood or affect    This document serves as a record of the services and decisions personally performed and made by Zainab Aguilar MD. It was created on his/her behalf by Anjelica Chong, trained medical scribe. The creation of this document is based the provider's statements to the medical scribes.    Scribe Anjelica Chong 6:03 PM, October 23, 2017  OBJECTIVE:     /76  Pulse 101  Temp 97.1  F (36.2  C) (Oral)  Wt 109.3 kg (241 lb)  LMP 09/29/2017  SpO2 96%  BMI 36.91 kg/m2  Body mass index is 36.91 kg/(m^2).  GENERAL: alert and no distress  NECK: no adenopathy, no asymmetry, masses, or scars and thyroid normal to palpation  RESP: lungs clear to auscultation - no rales, rhonchi or wheezes  CV: regular rate and rhythm, normal S1 S2, no S3 or S4, no murmur, click or rub, no peripheral edema and peripheral pulses strong  ABDOMEN: soft, nontender, no hepatosplenomegaly, no masses and bowel sounds normal  NEURO: Normal strength and tone, mentation intact and speech normal  PSYCH: mentation appears normal, affect normal/bright    Diagnostic Test Results:  Results for orders placed or performed in visit on 10/19/17   Wet prep   Result Value Ref Range    Specimen Description Vagina     Wet Prep No clue cells seen     Wet Prep No yeast seen      Wet Prep No Trichomonas seen    NEISSERIA GONORRHOEA PCR   Result Value Ref Range    Specimen Descrip Vagina     N Gonorrhea PCR Negative NEG^Negative   CHLAMYDIA TRACHOMATIS PCR   Result Value Ref Range    Specimen Description Vagina     Chlamydia Trachomatis PCR Negative NEG^Negative     ASSESSMENT/PLAN:   1. MS (multiple sclerosis) (H)  Stable. Followed by Neuro. Pt started ocrelizumab injections in September.    2. Type 2 diabetes mellitus with hyperosmolarity without coma, with long-term current use of insulin (H)  Uncontrolled. Options include more insulin, GLP1 agonist and bariatric surgery.  She will try splitting her Basaglar dose to 20 units bid. She will check with her insurance to see which med is covered- Trulicity, Victoza, or Bydureon. Will add on one of the medications pending coverage.  - insulin lispro (HUMALOG KWIKPEN) 100 UNIT/ML injection; Inject 15 Units Subcutaneous 3 times daily (with meals) . Insulin Pens  Dispense: 45 mL; Refill: 0  - BARIATRIC ADULT REFERRAL    3. BMI 36.0-36.9,adult  Discussed that bariatric surgery would likely reverse her diabetes and be a serious option for her to consider at this time. She is having difficulties affording her medications. Pt will schedule bariatric surgery consult to see what her options are.  - BARIATRIC ADULT REFERRAL    4. Iron deficiency  She will restart her iron supplement.    5. Hypocalcemia  Stable.      Patient Instructions   Schedule bariatric surgery consult to discuss your options.     Restart your iron supplement once a day.  Start taking a multivitamin.     Try splitting your Basaglar dose to 20 units twice a day and see if your dizziness resolves.    Call and ask your insurance and ask which one is covered: Victoza, Trulicity, or Bydureon?    Follow up with me in 1 month.         I spent 22 minutes of time with the patient and >50% of it was in education and counseling regarding diabetes check.    The information in this document,  created by the medical scribe for me, accurately reflects the services I personally performed and the decisions made by me. I have reviewed and approved this document for accuracy prior to leaving the patient care area.  Zainab Aguilar MD  6:03 PM, 10/23/17    Zainab Aguilar MD  [facility]    Start 6:03 PM  End 6:25 PM

## 2017-10-23 NOTE — NURSING NOTE
"Chief Complaint   Patient presents with     Diabetes     Follow up from visit in September       Initial /76  Pulse 101  Temp 97.1  F (36.2  C) (Oral)  Wt 241 lb (109.3 kg)  LMP 09/29/2017  SpO2 96%  BMI 36.91 kg/m2 Estimated body mass index is 36.91 kg/(m^2) as calculated from the following:    Height as of 10/12/17: 5' 7.75\" (1.721 m).    Weight as of this encounter: 241 lb (109.3 kg).  Medication Reconciliation: complete   Monique Gonzalez MA    "

## 2017-10-27 ENCOUNTER — CARE COORDINATION (OUTPATIENT)
Dept: SURGERY | Facility: CLINIC | Age: 47
End: 2017-10-27

## 2017-10-27 ENCOUNTER — PRE VISIT (OUTPATIENT)
Dept: SURGERY | Facility: CLINIC | Age: 47
End: 2017-10-27

## 2017-10-27 NOTE — TELEPHONE ENCOUNTER
APPT INFO    Date /Time:  11/1/17 at 8:30AM   Reason for Appt:  NBS   Ref Provider/Clinic:  Zainab Aguilar @ [facility]   Are there internal records? If yes, list:  [facility]   Patient Contact (Y/N) & Call Details: No, referred    Action:

## 2017-11-15 ENCOUNTER — OFFICE VISIT (OUTPATIENT)
Dept: MIDWIFE SERVICES | Facility: CLINIC | Age: 47
End: 2017-11-15
Payer: COMMERCIAL

## 2017-11-15 VITALS
SYSTOLIC BLOOD PRESSURE: 121 MMHG | WEIGHT: 239 LBS | HEART RATE: 103 BPM | TEMPERATURE: 97.1 F | BODY MASS INDEX: 36.61 KG/M2 | DIASTOLIC BLOOD PRESSURE: 80 MMHG

## 2017-11-15 DIAGNOSIS — N89.8 VAGINAL ITCHING: Primary | ICD-10-CM

## 2017-11-15 DIAGNOSIS — B96.89 BV (BACTERIAL VAGINOSIS): ICD-10-CM

## 2017-11-15 DIAGNOSIS — B37.31 YEAST INFECTION OF THE VAGINA: ICD-10-CM

## 2017-11-15 DIAGNOSIS — N76.0 BV (BACTERIAL VAGINOSIS): ICD-10-CM

## 2017-11-15 LAB
SPECIMEN SOURCE: ABNORMAL
WET PREP SPEC: ABNORMAL

## 2017-11-15 PROCEDURE — 99213 OFFICE O/P EST LOW 20 MIN: CPT | Performed by: ADVANCED PRACTICE MIDWIFE

## 2017-11-15 PROCEDURE — 87086 URINE CULTURE/COLONY COUNT: CPT | Performed by: ADVANCED PRACTICE MIDWIFE

## 2017-11-15 PROCEDURE — 87210 SMEAR WET MOUNT SALINE/INK: CPT | Performed by: ADVANCED PRACTICE MIDWIFE

## 2017-11-15 RX ORDER — FLUCONAZOLE 150 MG/1
150 TABLET ORAL
Qty: 2 TABLET | Refills: 0 | Status: SHIPPED | OUTPATIENT
Start: 2017-11-15 | End: 2017-12-20

## 2017-11-15 RX ORDER — METRONIDAZOLE 500 MG/1
500 TABLET ORAL 2 TIMES DAILY
Qty: 14 TABLET | Refills: 0 | Status: SHIPPED | OUTPATIENT
Start: 2017-11-15 | End: 2017-12-20

## 2017-11-15 NOTE — PROGRESS NOTES
SUBJECTIVE: 47 year old female presents with complaints of vaginal itching and urinary frequency. Patient was seen recently for a yeast and bacterial infection. She is here today to make sure they have resolved as she is still having some itching. On review of her chart she was diagnosed with a bacterial infection on 10/12/2017 and given Flagyl. No yeast infection at that time. She had negative GC/CT done 10/12/2017. Denied need for GC/CT testing today.   Patient is also having urinary frequency. She was looking on google as to what that could be attributed to and found something about ovarian cancer causing urinary frequency. Discussed most likely this is not the cause as she has no symptoms of anything else and she was told that her MS could cause urinary frequency so would assume that would be the cause. She has no family history of ovarian cancer.     Patient's last menstrual period was 11/01/2017.       Current Outpatient Prescriptions   Medication     liraglutide (VICTOZA) 18 MG/3ML soln     insulin pen needle 31G X 6 MM     insulin lispro (HUMALOG KWIKPEN) 100 UNIT/ML injection     ferrous sulfate (SLO-FE) 142 (45 FE) MG TBCR     multivitamin, therapeutic with minerals (MULTI-VITAMIN) TABS tablet     BASAGLAR 100 UNIT/ML injection     carBAMazepine (CARBATROL) 200 MG 12 hr capsule     vitamin D (ERGOCALCIFEROL) 91956 UNIT capsule     Ocrelizumab (OCREVUS IV)     venlafaxine (EFFEXOR-XR) 75 MG 24 hr capsule     metFORMIN (GLUCOPHAGE-XR) 500 MG 24 hr tablet     atorvastatin (LIPITOR) 40 MG tablet     diltiazem (CARDIZEM CD) 120 MG 24 hr capsule     blood glucose monitoring (NO BRAND SPECIFIED) meter device kit     blood glucose (NO BRAND SPECIFIED) lancets standard     blood glucose monitoring (NO BRAND SPECIFIED) test strip     Urea 20 % CREA     omeprazole (PRILOSEC) 40 MG capsule     Blood Glucose Calibration (FREESTYLE CONTROL SOLUTION) LIQD     aspirin 81 MG tablet     No current facility-administered  medications for this visit.      Allergies   Allergen Reactions     Buspar [Buspirone] Other (See Comments)     sleepy     Codeine Nausea and Vomiting     have tolerated Vicodin     Eryc [Erythromycin] Nausea and Vomiting     Fluoxetine Other (See Comments)     anxious     Metformin Diarrhea     Morphine Nausea and Vomiting     Social History   Substance Use Topics     Smoking status: Former Smoker     Packs/day: 0.30     Years: 5.00     Types: Cigarettes     Quit date: 1/1/2005     Smokeless tobacco: Never Used      Comment: 1/2010     Alcohol use 1.2 oz/week     2 Standard drinks or equivalent per week      Comment: occ         REVIEW OF SYSTEMS: Constitutional, HEENT, cardiovascular, pulmonary, gi and gu systems are negative, except as otherwise noted.    General medical, surgical, OB/Gyn and social histories   reviewed and updated in Histories section of [facility].     OBJECTIVE: /80  Pulse 103  Temp 97.1  F (36.2  C) (Oral)  Wt 239 lb (108.4 kg)  LMP 11/01/2017  BMI 36.61 kg/m2  Patient appears well.  Abdomen normal, soft without tenderness, guarding, mass or organomegaly.   No inguinal adenopathy or CVA tenderness.  Pelvic exam: normal vagina and vulva, vaginal discharge described as white.  WET PREP: clue cells and yeast      ASSESSMENT:   yeast, bacterial vaginosis.  (L29.8) Vaginal itching  (primary encounter diagnosis)  Plan: Wet prep, Urine Culture Aerobic Bacterial    PLAN:  Treatment plan per orders in [facility].   STD prevention discussed. Birth control needs reviewed.  Abstain from intercourse for duration of treatment.   Return if symptoms do not resolve as anticipated.  RX for flagyl and diflucan     Emily Contreras CNM

## 2017-11-15 NOTE — NURSING NOTE
"Chief Complaint   Patient presents with     Consult       Initial /80  Pulse 103  Temp 97.1  F (36.2  C) (Oral)  Wt 239 lb (108.4 kg)  LMP 2017  BMI 36.61 kg/m2 Estimated body mass index is 36.61 kg/(m^2) as calculated from the following:    Height as of 10/12/17: 5' 7.75\" (1.721 m).    Weight as of this encounter: 239 lb (108.4 kg).  BP completed using cuff size: regular        The following HM Due: NONE      The following patient reported/Care Every where data was sent to:  P ABSTRACT QUALITY INITIATIVES [34081]       N/a      Ananya Moreno MA               "

## 2017-11-15 NOTE — MR AVS SNAPSHOT
After Visit Summary   11/15/2017    Maranda Mallory    MRN: [id number]           Patient Information     Date Of Birth          1970        Visit Information        Provider Department      11/15/2017 12:30 PM Emily Contreras APRN CNM [facility]        Today's Diagnoses     Vaginal itching    -  1    BV (bacterial vaginosis)        Yeast infection of the vagina           Follow-ups after your visit        Your next 10 appointments already scheduled     Nov 27, 2017  6:00 PM CST   SHORT with Zainab Aguilar MD   [facility] ([address] [address] [phone]              Who to contact     If you have questions or need follow up information about today's clinic visit or your schedule please contact [facility] directly at [phone].  Normal or non-critical lab and imaging results will be communicated to you by MyChart, letter or phone within 4 business days after the clinic has received the results. If you do not hear from us within 7 days, please contact the clinic through [employer] or phone. If you have a critical or abnormal lab result, we will notify you by phone as soon as possible.  Submit refill requests through [employer] or call your pharmacy and they will forward the refill request to us. Please allow 3 business days for your refill to be completed.          Additional Information About Your Visit        MyChart Information     [employer] gives you secure access to your electronic health record. If you see a primary care provider, you can also send messages to your care team and make appointments. If you have questions, please call your primary care clinic.  If you do not have a primary care provider, please call [phone] and they will assist you.        Care EveryWhere ID     This is your Care EveryWhere ID. This could be used by other organizations to access your [facility]  records  [id number]        Your Vitals Were     Pulse Temperature Last Period BMI (Body Mass Index)          103 97.1  F (36.2  C) (Oral) 11/01/2017 36.61 kg/m2         Blood Pressure from Last 3 Encounters:   11/15/17 121/80   10/23/17 128/76   10/19/17 135/86    Weight from Last 3 Encounters:   11/15/17 239 lb (108.4 kg)   10/23/17 241 lb (109.3 kg)   10/19/17 239 lb (108.4 kg)              We Performed the Following     Urine Culture Aerobic Bacterial     Wet prep          Today's Medication Changes          These changes are accurate as of: 11/15/17  3:38 PM.  If you have any questions, ask your nurse or doctor.               Start taking these medicines.        Dose/Directions    fluconazole 150 MG tablet   Commonly known as:  DIFLUCAN   Used for:  Yeast infection of the vagina   Started by:  Emily Contreras APRN CNM        Dose:  150 mg   Take 1 tablet (150 mg) by mouth every 3 days   Quantity:  2 tablet   Refills:  0       metroNIDAZOLE 500 MG tablet   Commonly known as:  FLAGYL   Used for:  BV (bacterial vaginosis)   Started by:  Emily Contreras APRN CNM        Dose:  500 mg   Take 1 tablet (500 mg) by mouth 2 times daily   Quantity:  14 tablet   Refills:  0            Where to get your medicines      These medications were sent to [facility] [address] [place] [address] AT [address] [address]     Phone:  [phone]     fluconazole 150 MG tablet    metroNIDAZOLE 500 MG tablet                Primary Care Provider Office Phone # Fax #    Zainab Aguilar [phone]       [address] [place] [id number]        Equal Access to Services     KASI PERDUE AH: James Crenshaw, scooby hendricks, amanda ortegaaljamaica long, janna august. Renee [facility] [phone].    ATENCIÓN: Si habla español, tiene a ramsay disposición servicios gratuitos de asistencia  lingüística. Kale al [phone].    We comply with applicable federal civil rights laws and [place] laws. We do not discriminate on the basis of race, color, national origin, age, disability, sex, sexual orientation, or gender identity.            Thank you!     Thank you for choosing [facility]  for your care. Our goal is always to provide you with excellent care. Hearing back from our patients is one way we can continue to improve our services. Please take a few minutes to complete the written survey that you may receive in the mail after your visit with us. Thank you!             Your Updated Medication List - Protect others around you: Learn how to safely use, store and throw away your medicines at www.disposemymeds.org.          This list is accurate as of: 11/15/17  3:38 PM.  Always use your most recent med list.                   Brand Name Dispense Instructions for use Diagnosis    aspirin 81 MG tablet     30 tablet    Take 81 mg by mouth daily    Hyperlipidemia LDL goal <100       atorvastatin 40 MG tablet    LIPITOR    90 tablet    TAKE ONE TABLET BY MOUTH DAILY    Hyperlipidemia LDL goal <100       BASAGLAR 100 UNIT/ML injection     35 mL    Inject 40-44 Units Subcutaneous daily    Type 2 diabetes mellitus without complication, with long-term current use of insulin (H)       blood glucose calibration solution     1 each    Use to calibrate blood glucose monitor as directed.    Type 2 diabetes, HbA1c goal < 7% (H)       blood glucose lancets standard    no brand specified    3 Box    Use to test blood sugar 3 times daily or as directed.    Type 2 diabetes mellitus without complication (H)       blood glucose monitoring meter device kit    no brand specified    1 kit    Use to test blood sugar 3 times daily or as directed    Type 2 diabetes mellitus without complication (H)       blood glucose monitoring test strip    no brand specified    300 strip    Use to test blood sugars 3 times  daily or as directed    Type 2 diabetes mellitus without complication (H)       carBAMazepine 200 MG 12 hr capsule    CARBATROL     Take 200 mg by mouth 2 times daily        diltiazem 120 MG 24 hr capsule    CARDIZEM CD    30 capsule    Take 1 capsule (120 mg) by mouth daily    Paroxysmal supraventricular tachycardia (H)       ferrous sulfate 142 (45 FE) MG Tbcr    SLO-FE    30 tablet    Take 1 tablet (142 mg) by mouth daily        fluconazole 150 MG tablet    DIFLUCAN    2 tablet    Take 1 tablet (150 mg) by mouth every 3 days    Yeast infection of the vagina       insulin lispro 100 UNIT/ML injection    HumaLOG KWIKpen    45 mL    Inject 15 Units Subcutaneous 3 times daily (with meals) . Insulin Pens    Type 2 diabetes mellitus with hyperosmolarity without coma, with long-term current use of insulin (H)       insulin pen needle 31G X 6 MM     500 each    Use 5 daily or as directed.    Type 2 diabetes mellitus with hyperglycemia, with long-term current use of insulin (H)       liraglutide 18 MG/3ML soln    VICTOZA    9 mL    Start with 0.6 MG daily for 1 week, then 1.2 MG daily for a week, and then 1.8 MG daily    Type 2 diabetes mellitus without complication (H)       metFORMIN 500 MG 24 hr tablet    GLUCOPHAGE-XR    120 tablet    Take 1 tablet (500 mg) by mouth daily (with dinner)    Type 2 diabetes mellitus without complication, with long-term current use of insulin (H)       metroNIDAZOLE 500 MG tablet    FLAGYL    14 tablet    Take 1 tablet (500 mg) by mouth 2 times daily    BV (bacterial vaginosis)       Multi-vitamin Tabs tablet     100 tablet    Take 1 tablet by mouth daily        OCREVUS IV      Inject into the vein every 6 months        omeprazole 40 MG capsule    priLOSEC    180 capsule    TAKE ONE CAPSULE BY MOUTH TWICE DAILY    Gastroesophageal reflux disease, esophagitis presence not specified       Urea 20 % Crea cream     120 g    To dry and scaly feet twice a day as needed.    Skin cancer screening        venlafaxine 75 MG 24 hr capsule    EFFEXOR-XR     Take 75 mg by mouth daily        vitamin D 44740 UNIT capsule    ERGOCALCIFEROL     Take 50,000 Units by mouth twice a week

## 2017-11-16 LAB
BACTERIA SPEC CULT: NORMAL
SPECIMEN SOURCE: NORMAL

## 2017-12-18 NOTE — PROGRESS NOTES
"INTERNAL MEDICINE  SUBJECTIVE:   Maranda Mallory is a 47 year old female who presents to clinic today for the following health issues:    Patient presents to clinic today for one month diabetes follow up.    Office visit on 10/23/17:  \"Schedule bariatric surgery consult to discuss your options.   Restart your iron supplement once a day.  Start taking a multivitamin.   Try splitting your Basaglar dose to 20 units twice a day and see if your dizziness resolves.  Call and ask your insurance and ask which one is covered: Victoza, Trulicity, or Bydureon?  Follow up with me in 1 month.\"    Lab Results   Component Value Date    A1C 10.4 2017    A1C 11.1 2017    A1C 9.0 2017    A1C 9.2 01/10/2017    A1C 9.4 2017       Diabetes - Her blood sugars are not good. She checked her blood sugar and it was 356. She took her insulin (15 units) and her blood sugar was 353 two hours later. Prior to injection the insulin she primes the pen and insulin is coming out. Her pen is not . She is frustrated that the insulin is not making any difference for her blood sugar. Sometimes she misses evening doses of Novolog, but always takes morning and lunch doses. She is on 40 units of Basaglar, 15 units of Novolog before each meal, Metformin 500 mg at night, and Victoza weekly. Her morning fasting blood sugars are in the 200's. Her blood sugar readings are never under 200.     Weight - Her goal in the new year is to lose weight. She wants to go through with bariatric surgery, has the number, but has not had the chance to make the appointment yet.  Patient has not been exercising but plans to join the [employer] close to her house.    Vision - She had an eye exam today and was told that she has a cataract forming. Her vision goes blurry which is likely due to elevated blood sugar. Her dad went blind from his diabetes.     Stomach pain - She saw GI for the abdominal pain she has been having for some time now. It " came back that she was anemic and it was recommended that she schedule a colonoscopy. In the past when she has taken iron supplements her iron levels have gone back up.    Anxiety - Her anxiety has been better. They finally found her daughter and have gotten her the help she needs. Certain things like medical conditions set her off.    MS - She had the infusion for her MS in August and her pain has been improving.      Problem list and histories reviewed & adjusted, as indicated.  Additional history: as documented    Labs reviewed in EPIC  Reviewed and updated as needed this visit by clinical staff  Tobacco  Allergies  Meds  Med Hx  Surg Hx  Fam Hx  Soc Hx      Reviewed and updated as needed this visit by Provider         ROS:  C: NEGATIVE for fever, chills, change in weight  R: NEGATIVE for significant cough or SOB  CV: NEGATIVE for chest pain, palpitations or peripheral edema  GI: NEGATIVE for nausea, abdominal pain, heartburn, or change in bowel habits  N: NEGATIVE for weakness, dizziness or paresthesias  P: NEGATIVE for changes in mood or affect    This document serves as a record of the services and decisions personally performed and made by Zainab Aguilar MD. It was created on his/her behalf by Anjelica Chong, trained medical scribe. The creation of this document is based the provider's statements to the medical scribes.    Scrpau Chong 3:13 PM, December 20, 2017  OBJECTIVE:     /82 (BP Location: Left arm, Cuff Size: Adult Large)  Pulse 107  Temp 97.1  F (36.2  C) (Oral)  Wt 109.3 kg (241 lb)  LMP 12/01/2017 (Exact Date)  SpO2 96%  Breastfeeding? No  BMI 36.91 kg/m2  Body mass index is 36.91 kg/(m^2).  GENERAL: alert and no distress  RESP: lungs clear to auscultation - no rales, rhonchi or wheezes  CV: Tachycardia and regular rhythm, normal S1 S2, no S3 or S4, no murmur, click or rub, no peripheral edema and peripheral pulses strong  NEURO: Normal strength and tone, mentation intact  and speech normal  PSYCH: mentation appears normal, affect normal/bright  Diabetic foot exam: normal DP and PT pulses, no trophic changes or ulcerative lesions, normal sensory exam and normal monofilament exam    Diagnostic Test Results:  Results for orders placed or performed in visit on 12/20/17 (from the past 24 hour(s))   Hemoglobin A1c   Result Value Ref Range    Hemoglobin A1C 10.4 (H) 4.3 - 6.0 %     ASSESSMENT/PLAN:   1. Type 2 diabetes mellitus with hyperosmolarity without coma, with long-term current use of insulin (H)  Uncontrolled, slight improvement. She will increase her Basaglar dose to 45 units in the AM and her Novolog dose to 17 units three times a day. If her BG readings remain over 200 she will increase her Basaglar dose to 5o units in the AM and her Novolog dose to 20 units three times a day. She will start logging her BG readings and insulin dose and send me her log in one week for adjustments. Continues on Metformin 500 mg daily and Victoza 1.8 mg weekly. Discussed dietary modifications.   - Hemoglobin A1c  - Basic metabolic panel  - CK total  - insulin aspart (NOVOLOG PEN) 100 UNIT/ML injection; INJECT 20 UNITS UNDER THE SKIN THREE TIMES DAILY WITH MEALS  Dispense: 30 mL; Refill: 3  - BASAGLAR 100 UNIT/ML injection; Inject 50 Units Subcutaneous daily  Dispense: 45 mL; Refill: 1    2. Morbid obesity due to excess calories (H)  She plans to schedule bariatric consult and get a gym membership in the new year.     3. MS (multiple sclerosis) (H)  Stable. Followed by Neuro. Started ocrelizumab injections in September. The current medical regimen is effective;  continue present plan and medications.    4. Iron deficiency anemia, unspecified iron deficiency anemia type  Stable. GI recommended she schedule a colonoscopy for further evaluation. Will recheck ferritin today.  - CBC with platelets  - Ferritin    5. Screening for diabetic peripheral neuropathy    - FOOT EXAM  NO CHARGE [44346.025]    6.  Gastroesophageal reflux disease, esophagitis presence not specified  Stable. Continues on omeprazole 40 mg bid.   - omeprazole (PRILOSEC) 40 MG capsule; Take 1 capsule (40 mg) by mouth 2 times daily  Dispense: 180 capsule; Refill: 3    7. Iron deficiency  See above.    8. Paroxysmal supraventricular tachycardia (H)  Stable. The current medical regimen is effective;  continue present plan and medications.      Patient Instructions     Increase your Basaglar dose to 45 units in the morning and your Novolog dose to 17 units three times a day before meals.    Come Monday if your blood sugars are still over 200 at all times- increase your Basaglar dose to 50 units in the morning and Novolog dose to 20 units three times a day.  MyChart me your blood sugars in 1 week.    Continue Metformin 500 mg once daily and Victoza.     Start logging your blood sugars and insulin dose. MyChart me your log in one week.    [facility]    If you have any questions regarding to your visit please contact your care team:     Team Pink:   Clinic Hours Telephone Number   Internal Medicine:  Dr. Zainab Geller, NP       7am-7pm  Monday - Thursday   7am-5pm  Fridays  [phone]  (Appointment scheduling available 24/7)    Questions about your visit?  Team Line  [phone]   Urgent Care - [place] - 11am-9pm Monday-Friday Saturday-Sunday- 9am-5pm   [place] - 5pm-9pm Monday-Friday Saturday-Sunday- 9am-5pm  [phone] - [place]  [phone] - [place]       What options do I have for visits at the clinic other than the traditional office visit?  To expand how we care for you, many of our providers are utilizing electronic visits (e-visits) and telephone visits, when medically appropriate, for interactions with their patients rather than a visit in the clinic.   We also offer nurse visits for many medical concerns. Just like any other service, we will bill  your insurance company for this type of visit based on time spent on the phone with your provider. Not all insurance companies cover these visits. Please check with your medical insurance if this type of visit is covered. You will be responsible for any charges that are not paid by your insurance.      E-visits via [employer]:  generally incur a $35.00 fee.  Telephone visits:  Time spent on the phone: *charged based on time that is spent on the phone in increments of 10 minutes. Estimated cost:   5-10 mins $30.00   11-20 mins. $59.00   21-30 mins. $85.00   Use [employer] (secure email communication and access to your chart) to send your primary care provider a message or make an appointment. Ask someone on your Team how to sign up for [employer].    For a Price Quote for your services, please call our Consumer Price Line at [phone].    As always, Thank you for trusting us with your health care needs!    Lisa Kee [facility]      I spent 22 minutes of time with the patient and >50% of it was in education and counseling regarding one month diabetes follow up.    The information in this document, created by the medical scribe for me, accurately reflects the services I personally performed and the decisions made by me. I have reviewed and approved this document for accuracy prior to leaving the patient care area.  Zainab Aguilar MD  3:13 PM, 12/20/17    Zainab Aguilar MD  [facility]    Start 3:13 PM  End 3:35 PM

## 2017-12-20 ENCOUNTER — TRANSFERRED RECORDS (OUTPATIENT)
Dept: HEALTH INFORMATION MANAGEMENT | Facility: CLINIC | Age: 47
End: 2017-12-20

## 2017-12-20 ENCOUNTER — OFFICE VISIT (OUTPATIENT)
Dept: INTERNAL MEDICINE | Facility: CLINIC | Age: 47
End: 2017-12-20
Payer: COMMERCIAL

## 2017-12-20 VITALS
WEIGHT: 241 LBS | OXYGEN SATURATION: 96 % | BODY MASS INDEX: 36.91 KG/M2 | HEART RATE: 107 BPM | DIASTOLIC BLOOD PRESSURE: 82 MMHG | TEMPERATURE: 97.1 F | SYSTOLIC BLOOD PRESSURE: 130 MMHG

## 2017-12-20 DIAGNOSIS — I47.10 PAROXYSMAL SUPRAVENTRICULAR TACHYCARDIA (H): ICD-10-CM

## 2017-12-20 DIAGNOSIS — D50.9 IRON DEFICIENCY ANEMIA, UNSPECIFIED IRON DEFICIENCY ANEMIA TYPE: ICD-10-CM

## 2017-12-20 DIAGNOSIS — E11.00 TYPE 2 DIABETES MELLITUS WITH HYPEROSMOLARITY WITHOUT COMA, WITH LONG-TERM CURRENT USE OF INSULIN (H): Primary | ICD-10-CM

## 2017-12-20 DIAGNOSIS — E66.01 MORBID OBESITY DUE TO EXCESS CALORIES (H): ICD-10-CM

## 2017-12-20 DIAGNOSIS — Z79.4 TYPE 2 DIABETES MELLITUS WITH HYPEROSMOLARITY WITHOUT COMA, WITH LONG-TERM CURRENT USE OF INSULIN (H): Primary | ICD-10-CM

## 2017-12-20 DIAGNOSIS — Z79.4 TYPE 2 DIABETES MELLITUS WITHOUT COMPLICATION, WITH LONG-TERM CURRENT USE OF INSULIN (H): ICD-10-CM

## 2017-12-20 DIAGNOSIS — G35 MS (MULTIPLE SCLEROSIS) (H): ICD-10-CM

## 2017-12-20 DIAGNOSIS — K21.9 GASTROESOPHAGEAL REFLUX DISEASE, ESOPHAGITIS PRESENCE NOT SPECIFIED: ICD-10-CM

## 2017-12-20 DIAGNOSIS — Z13.89 SCREENING FOR DIABETIC PERIPHERAL NEUROPATHY: ICD-10-CM

## 2017-12-20 DIAGNOSIS — E11.9 TYPE 2 DIABETES MELLITUS WITHOUT COMPLICATION, WITH LONG-TERM CURRENT USE OF INSULIN (H): ICD-10-CM

## 2017-12-20 DIAGNOSIS — E61.1 IRON DEFICIENCY: ICD-10-CM

## 2017-12-20 LAB
ANION GAP SERPL CALCULATED.3IONS-SCNC: 12 MMOL/L (ref 3–14)
BUN SERPL-MCNC: 8 MG/DL (ref 7–30)
CALCIUM SERPL-MCNC: 9.4 MG/DL (ref 8.5–10.1)
CHLORIDE SERPL-SCNC: 101 MMOL/L (ref 94–109)
CK SERPL-CCNC: 54 U/L (ref 30–225)
CO2 SERPL-SCNC: 25 MMOL/L (ref 20–32)
CREAT SERPL-MCNC: 0.44 MG/DL (ref 0.52–1.04)
ERYTHROCYTE [DISTWIDTH] IN BLOOD BY AUTOMATED COUNT: 13.6 % (ref 10–15)
FERRITIN SERPL-MCNC: 34 NG/ML (ref 8–252)
GFR SERPL CREATININE-BSD FRML MDRD: >90 ML/MIN/1.7M2
GLUCOSE SERPL-MCNC: 263 MG/DL (ref 70–99)
HBA1C MFR BLD: 10.4 % (ref 4.3–6)
HCT VFR BLD AUTO: 41.9 % (ref 35–47)
HGB BLD-MCNC: 14.2 G/DL (ref 11.7–15.7)
MCH RBC QN AUTO: 29.8 PG (ref 26.5–33)
MCHC RBC AUTO-ENTMCNC: 33.9 G/DL (ref 31.5–36.5)
MCV RBC AUTO: 88 FL (ref 78–100)
PLATELET # BLD AUTO: 361 10E9/L (ref 150–450)
POTASSIUM SERPL-SCNC: 3.8 MMOL/L (ref 3.4–5.3)
RBC # BLD AUTO: 4.76 10E12/L (ref 3.8–5.2)
SODIUM SERPL-SCNC: 138 MMOL/L (ref 133–144)
WBC # BLD AUTO: 9.2 10E9/L (ref 4–11)

## 2017-12-20 PROCEDURE — 80048 BASIC METABOLIC PNL TOTAL CA: CPT | Performed by: INTERNAL MEDICINE

## 2017-12-20 PROCEDURE — 36415 COLL VENOUS BLD VENIPUNCTURE: CPT | Performed by: INTERNAL MEDICINE

## 2017-12-20 PROCEDURE — 82550 ASSAY OF CK (CPK): CPT | Performed by: INTERNAL MEDICINE

## 2017-12-20 PROCEDURE — 85027 COMPLETE CBC AUTOMATED: CPT | Performed by: INTERNAL MEDICINE

## 2017-12-20 PROCEDURE — 99214 OFFICE O/P EST MOD 30 MIN: CPT | Performed by: INTERNAL MEDICINE

## 2017-12-20 PROCEDURE — 99207 C FOOT EXAM  NO CHARGE: CPT | Performed by: INTERNAL MEDICINE

## 2017-12-20 PROCEDURE — 82728 ASSAY OF FERRITIN: CPT | Performed by: INTERNAL MEDICINE

## 2017-12-20 PROCEDURE — 83036 HEMOGLOBIN GLYCOSYLATED A1C: CPT | Performed by: INTERNAL MEDICINE

## 2017-12-20 RX ORDER — INSULIN GLARGINE 100 [IU]/ML
50 INJECTION, SOLUTION SUBCUTANEOUS DAILY
Qty: 45 ML | Refills: 1 | Status: SHIPPED | OUTPATIENT
Start: 2017-12-20

## 2017-12-20 RX ORDER — OMEPRAZOLE 40 MG/1
40 CAPSULE, DELAYED RELEASE ORAL 2 TIMES DAILY
Qty: 180 CAPSULE | Refills: 3 | Status: SHIPPED | OUTPATIENT
Start: 2017-12-20

## 2017-12-20 ASSESSMENT — PAIN SCALES - GENERAL: PAINLEVEL: NO PAIN (0)

## 2017-12-20 NOTE — PATIENT INSTRUCTIONS
Increase your Basaglar dose to 45 units in the morning and your Novolog dose to 17 units three times a day before meals.    Come Monday if your blood sugars are still over 200 at all times- increase your Basaglar dose to 50 units in the morning and Novolog dose to 20 units three times a day.  MyChart me your blood sugars in 1 week.    Continue Metformin 500 mg once daily and Victoza.     Start logging your blood sugars and insulin dose. MyChart me your log in one week.    [facility]    If you have any questions regarding to your visit please contact your care team:     Team Pink:   Clinic Hours Telephone Number   Internal Medicine:  Dr. Zainab Geller, NP       7am-7pm  Monday - Thursday   7am-5pm  Fridays  [phone]  (Appointment scheduling available 24/7)    Questions about your visit?  Team Line  [phone]   Urgent Care - [place] - 11am-9pm Monday-Friday Saturday-Sunday- 9am-5pm   [place] - 5pm-9pm Monday-Friday Saturday-Sunday- 9am-5pm  [phone] - [place]   [phone] - [place]       What options do I have for visits at the clinic other than the traditional office visit?  To expand how we care for you, many of our providers are utilizing electronic visits (e-visits) and telephone visits, when medically appropriate, for interactions with their patients rather than a visit in the clinic.   We also offer nurse visits for many medical concerns. Just like any other service, we will bill your insurance company for this type of visit based on time spent on the phone with your provider. Not all insurance companies cover these visits. Please check with your medical insurance if this type of visit is covered. You will be responsible for any charges that are not paid by your insurance.      E-visits via [employer]:  generally incur a $35.00 fee.  Telephone visits:  Time spent on the phone: *charged based on time that is spent  on the phone in increments of 10 minutes. Estimated cost:   5-10 mins $30.00   11-20 mins. $59.00   21-30 mins. $85.00   Use [employer] (secure email communication and access to your chart) to send your primary care provider a message or make an appointment. Ask someone on your Team how to sign up for [employer].    For a Price Quote for your services, please call our Tarisa Line at [phone].    As always, Thank you for trusting us with your health care needs!    Lisa Kee, CMA

## 2017-12-20 NOTE — MR AVS SNAPSHOT
After Visit Summary   12/20/2017    Maranda Mallory    MRN: [id number]           Patient Information     Date Of Birth          1970        Visit Information        Provider Department      12/20/2017 3:00 PM Zainab Aguilar MD [facility]        Today's Diagnoses     Type 2 diabetes mellitus with hyperosmolarity without coma, with long-term current use of insulin (H)    -  1    Morbid obesity due to excess calories (H)        MS (multiple sclerosis) (H)        Iron deficiency anemia, unspecified iron deficiency anemia type        Screening for diabetic peripheral neuropathy        Gastroesophageal reflux disease, esophagitis presence not specified        Type 2 diabetes mellitus without complication, with long-term current use of insulin (H)        Iron deficiency        Paroxysmal supraventricular tachycardia (H)          Care Instructions    Increase your Basaglar dose to 45 units in the morning and your Novolog dose to 17 units three times a day before meals.    Come Monday if your blood sugars are still over 200 at all times- increase your Basaglar dose to 50 units in the morning and Novolog dose to 20 units three times a day.  MyChart me your blood sugars in 1 week.    Continue Metformin 500 mg once daily and Victoza.     Start logging your blood sugars and insulin dose. MyChart me your log in one week.    [facility]    If you have any questions regarding to your visit please contact your care team:     Team Pink:   Clinic Hours Telephone Number   Internal Medicine:  Dr. Zainab Geller NP       7am-7pm  Monday - Thursday   7am-5pm  Fridays  [phone]  (Appointment scheduling available 24/7)    Questions about your visit?  Team Line  [phone]   Urgent Care - [place] - 11am-9pm Monday-Friday Saturday-Sunday- 9am-5pm   [place] - 5pm-9pm Monday-Friday Saturday-Sunday- 9am-5pm  [phone]  Kasi SILVA  [phone] - [place]       What options do I have for visits at the clinic other than the traditional office visit?  To expand how we care for you, many of our providers are utilizing electronic visits (e-visits) and telephone visits, when medically appropriate, for interactions with their patients rather than a visit in the clinic.   We also offer nurse visits for many medical concerns. Just like any other service, we will bill your insurance company for this type of visit based on time spent on the phone with your provider. Not all insurance companies cover these visits. Please check with your medical insurance if this type of visit is covered. You will be responsible for any charges that are not paid by your insurance.      E-visits via [employer]:  generally incur a $35.00 fee.  Telephone visits:  Time spent on the phone: *charged based on time that is spent on the phone in increments of 10 minutes. Estimated cost:   5-10 mins $30.00   11-20 mins. $59.00   21-30 mins. $85.00   Use [employer] (secure email communication and access to your chart) to send your primary care provider a message or make an appointment. Ask someone on your Team how to sign up for [employer].    For a Price Quote for your services, please call our [employer] Price Line at [phone].    As always, Thank you for trusting us with your health care needs!    Lisa Kee, CMA          Follow-ups after your visit        Who to contact     If you have questions or need follow up information about today's clinic visit or your schedule please contact [facility] directly at [phone].  Normal or non-critical lab and imaging results will be communicated to you by MyChart, letter or phone within 4 business days after the clinic has received the results. If you do not hear from us within 7 days, please contact the clinic through MyChart or phone. If you have a critical or abnormal lab result, we will notify you by phone as  soon as possible.  Submit refill requests through [employer] or call your pharmacy and they will forward the refill request to us. Please allow 3 business days for your refill to be completed.          Additional Information About Your Visit        [employer] Information     [employer] gives you secure access to your electronic health record. If you see a primary care provider, you can also send messages to your care team and make appointments. If you have questions, please call your primary care clinic.  If you do not have a primary care provider, please call [phone] and they will assist you.        Care EveryWhere ID     This is your Care EveryWhere ID. This could be used by other organizations to access your [place] medical records  [id number]        Your Vitals Were     Pulse Temperature Last Period Pulse Oximetry Breastfeeding? BMI (Body Mass Index)    107 97.1  F (36.2  C) (Oral) 12/01/2017 (Exact Date) 96% No 36.91 kg/m2       Blood Pressure from Last 3 Encounters:   12/20/17 130/82   11/15/17 121/80   10/23/17 128/76    Weight from Last 3 Encounters:   12/20/17 241 lb (109.3 kg)   11/15/17 239 lb (108.4 kg)   10/23/17 241 lb (109.3 kg)              We Performed the Following     Basic metabolic panel     CBC with platelets     CK total     Ferritin     FOOT EXAM  NO CHARGE [[id number]]     Hemoglobin A1c          Today's Medication Changes          These changes are accurate as of: 12/20/17  3:39 PM.  If you have any questions, ask your nurse or doctor.               These medicines have changed or have updated prescriptions.        Dose/Directions    BASAGLAR 100 UNIT/ML injection   This may have changed:  how much to take   Used for:  Type 2 diabetes mellitus without complication, with long-term current use of insulin (H)   Changed by:  Zainab Aguilar MD        Dose:  50 Units   Inject 50 Units Subcutaneous daily   Quantity:  45 mL   Refills:  1       insulin aspart 100 UNIT/ML injection   Commonly known  as:  NovoLOG PEN   This may have changed:  See the new instructions.   Used for:  Type 2 diabetes mellitus with hyperosmolarity without coma, with long-term current use of insulin (H)   Changed by:  Zainab Aguilar MD        INJECT 20 UNITS UNDER THE SKIN THREE TIMES DAILY WITH MEALS   Quantity:  30 mL   Refills:  3       * omeprazole 40 MG capsule   Commonly known as:  priLOSEC   This may have changed:  Another medication with the same name was changed. Make sure you understand how and when to take each.   Used for:  Gastroesophageal reflux disease, esophagitis presence not specified   Changed by:  Zainab Aguilar MD        TAKE ONE CAPSULE BY MOUTH TWICE DAILY   Quantity:  180 capsule   Refills:  1       * omeprazole 40 MG capsule   Commonly known as:  priLOSEC   This may have changed:  See the new instructions.   Used for:  Gastroesophageal reflux disease, esophagitis presence not specified   Changed by:  Zainab Aguilar MD        Dose:  40 mg   Take 1 capsule (40 mg) by mouth 2 times daily   Quantity:  180 capsule   Refills:  3       * Notice:  This list has 2 medication(s) that are the same as other medications prescribed for you. Read the directions carefully, and ask your doctor or other care provider to review them with you.      Stop taking these medicines if you haven't already. Please contact your care team if you have questions.     fluconazole 150 MG tablet   Commonly known as:  DIFLUCAN   Stopped by:  Zainab Aguilar MD                Where to get your medicines      [facility] [address] [place] [address] [employer]  AT [facility]  [address] [facility] [address]     Phone:  [phone]     BASAGLAR 100 UNIT/ML injection    insulin aspart 100 UNIT/ML injection    omeprazole 40 MG capsule                Primary Care Provider Office Phone # Fax #    Zainab Aguilar [phone]  [facility] [address]        Equal Access to Services     KASI PERDUE : Hadii aad ku hadlizbethamaury Sonadiya, wakoreyda luqadaha, qagrayta shannonsilvestrekrzysztof long, janna willinghamfeliciamichelle august. So [facility] [phone].    ATENCIÓN: Si habla español, tiene a ramsay disposición servicios gratuitos de asistencia lingüística. [facility] [phone].    We comply with applicable federal civil rights laws and [place] laws. We do not discriminate on the basis of race, color, national origin, age, disability, sex, sexual orientation, or gender identity.            Thank you!     Thank you for choosing [facility]  for your care. Our goal is always to provide you with excellent care. Hearing back from our patients is one way we can continue to improve our services. Please take a few minutes to complete the written survey that you may receive in the mail after your visit with us. Thank you!             Your Updated Medication List - Protect others around you: Learn how to safely use, store and throw away your medicines at www.disposemymeds.org.          This list is accurate as of: 12/20/17  3:39 PM.  Always use your most recent med list.                   Brand Name Dispense Instructions for use Diagnosis    aspirin 81 MG tablet     30 tablet    Take 81 mg by mouth daily    Hyperlipidemia LDL goal <100       atorvastatin 40 MG tablet    LIPITOR    90 tablet    TAKE ONE TABLET BY MOUTH DAILY    Hyperlipidemia LDL goal <100       BASAGLAR 100 UNIT/ML injection     45 mL    Inject 50 Units Subcutaneous daily    Type 2 diabetes mellitus without complication, with long-term current use of insulin (H)       blood glucose calibration solution     1 each    Use to calibrate blood glucose monitor as directed.    Type 2 diabetes, HbA1c goal < 7% (H)       blood glucose lancets standard    no brand specified    3 Box    Use to test blood sugar 3 times daily or as directed.    Type 2 diabetes mellitus without  complication (H)       blood glucose monitoring meter device kit    no brand specified    1 kit    Use to test blood sugar 3 times daily or as directed    Type 2 diabetes mellitus without complication (H)       blood glucose monitoring test strip    no brand specified    300 strip    Use to test blood sugars 3 times daily or as directed    Type 2 diabetes mellitus without complication (H)       diltiazem 120 MG 24 hr capsule    CARDIZEM CD    30 capsule    Take 1 capsule (120 mg) by mouth daily    Paroxysmal supraventricular tachycardia (H)       ferrous sulfate 142 (45 FE) MG Tbcr    SLO-FE    30 tablet    Take 1 tablet (142 mg) by mouth daily        insulin aspart 100 UNIT/ML injection    NovoLOG PEN    30 mL    INJECT 20 UNITS UNDER THE SKIN THREE TIMES DAILY WITH MEALS    Type 2 diabetes mellitus with hyperosmolarity without coma, with long-term current use of insulin (H)       insulin pen needle 31G X 6 MM     500 each    Use 5 daily or as directed.    Type 2 diabetes mellitus with hyperglycemia, with long-term current use of insulin (H)       liraglutide 18 MG/3ML soln    VICTOZA    9 mL    Start with 0.6 MG daily for 1 week, then 1.2 MG daily for a week, and then 1.8 MG daily    Type 2 diabetes mellitus without complication (H)       metFORMIN 500 MG 24 hr tablet    GLUCOPHAGE-XR    120 tablet    Take 1 tablet (500 mg) by mouth daily (with dinner)    Type 2 diabetes mellitus without complication, with long-term current use of insulin (H)       OCREVUS IV      Inject into the vein every 6 months        * omeprazole 40 MG capsule    priLOSEC    180 capsule    TAKE ONE CAPSULE BY MOUTH TWICE DAILY    Gastroesophageal reflux disease, esophagitis presence not specified       * omeprazole 40 MG capsule    priLOSEC    180 capsule    Take 1 capsule (40 mg) by mouth 2 times daily    Gastroesophageal reflux disease, esophagitis presence not specified       Urea 20 % Crea cream     120 g    To dry and scaly feet twice a  day as needed.    Skin cancer screening       venlafaxine 75 MG 24 hr capsule    EFFEXOR-XR     Take 75 mg by mouth daily        vitamin D 08865 UNIT capsule    ERGOCALCIFEROL     Take 50,000 Units by mouth twice a week        * Notice:  This list has 2 medication(s) that are the same as other medications prescribed for you. Read the directions carefully, and ask your doctor or other care provider to review them with you.

## 2017-12-20 NOTE — NURSING NOTE
"Chief Complaint   Patient presents with     Diabetes     1 month follow up, Requesting Iron to be checked.       Initial /82 (BP Location: Left arm, Cuff Size: Adult Large)  Pulse 107  Temp 97.1  F (36.2  C) (Oral)  Wt 241 lb (109.3 kg)  LMP 12/01/2017 (Exact Date)  SpO2 96%  Breastfeeding? No  BMI 36.91 kg/m2 Estimated body mass index is 36.91 kg/(m^2) as calculated from the following:    Height as of 10/12/17: 5' 7.75\" (1.721 m).    Weight as of this encounter: 241 lb (109.3 kg).  Medication Reconciliation: complete       Lisa Kee CMA    "

## 2017-12-21 NOTE — PROGRESS NOTES
Normal iron levels. Normal muscle test. Normal electrolytes. Normal kidney function. Normal blood count.

## 2017-12-27 ENCOUNTER — TELEPHONE (OUTPATIENT)
Dept: INTERNAL MEDICINE | Facility: CLINIC | Age: 47
End: 2017-12-27

## 2017-12-27 DIAGNOSIS — E11.9 TYPE 2 DIABETES MELLITUS WITHOUT COMPLICATION, WITH LONG-TERM CURRENT USE OF INSULIN (H): Primary | ICD-10-CM

## 2017-12-27 DIAGNOSIS — Z79.4 TYPE 2 DIABETES MELLITUS WITHOUT COMPLICATION, WITH LONG-TERM CURRENT USE OF INSULIN (H): Primary | ICD-10-CM

## 2017-12-27 NOTE — TELEPHONE ENCOUNTER
Ok to change to humalog flexpen.  Same instructions.  3 month supply with 1 refill.   Dr. Aguilar

## 2017-12-27 NOTE — TELEPHONE ENCOUNTER
Received fax from pharmacy stating patient requires Prior Authorization for Novolog flex pen.     Insurance information:   Name: [employer]  Phone number:  [phone]   ID number: [id number]     Initiate prior authorization or change medication?    If a prior authorization is to be initiated, please list the following:    -any medications the patient has tried and failed or any contraindications.  -is the patient currently on this medication, or has tried before?  -What is the diagnosis?  -Justification or other information that may be helpful.

## 2018-01-02 DIAGNOSIS — N89.8 VAGINAL ITCHING: ICD-10-CM

## 2018-01-02 RX ORDER — FLUCONAZOLE 150 MG/1
150 TABLET ORAL ONCE
Qty: 1 TABLET | Refills: 1 | Status: SHIPPED | OUTPATIENT
Start: 2018-01-02 | End: 2018-01-02

## 2018-01-02 NOTE — TELEPHONE ENCOUNTER
Pending Prescriptions:                       Disp   Refills    fluconazole (DIFLUCAN) 150 MG tablet      1 tabl*1            Sig: Take 1 tablet (150 mg) by mouth once for 1 dose    Per protocol, had + yeast on wet prep 11/2017. Rx sent.  Lisa Underwood

## 2018-01-10 NOTE — TELEPHONE ENCOUNTER
Left 2nd message on voicemail for patient to return call to RN hotline at # [phone].    Nguyễn Howe RN

## 2018-01-15 ENCOUNTER — TELEPHONE (OUTPATIENT)
Dept: INTERNAL MEDICINE | Facility: CLINIC | Age: 48
End: 2018-01-15

## 2018-01-15 NOTE — TELEPHONE ENCOUNTER
Panel Management Review      Patient has the following on her problem list:     Diabetes    ASA: Passed    Last A1C  Lab Results   Component Value Date    A1C 10.4 12/20/2017    A1C 11.1 09/16/2017    A1C 9.0 03/24/2017    A1C 9.2 01/10/2017    A1C 9.4 01/07/2017     A1C tested: FAILED    Last LDL:    Lab Results   Component Value Date    CHOL 173 09/16/2017     Lab Results   Component Value Date    HDL 43 09/16/2017     Lab Results   Component Value Date    LDL 53 09/16/2017     Lab Results   Component Value Date    TRIG 384 09/16/2017     Lab Results   Component Value Date    CHOLHDLRATIO 4.2 01/10/2015     Lab Results   Component Value Date    NHDL 130 09/16/2017       Is the patient on a Statin? YES             Is the patient on Aspirin? YES    Medications     HMG CoA Reductase Inhibitors    atorvastatin (LIPITOR) 40 MG tablet    Salicylates    aspirin 81 MG tablet          Last three blood pressure readings:  BP Readings from Last 3 Encounters:   12/20/17 130/82   11/15/17 121/80   10/23/17 128/76       Date of last diabetes office visit: 12/20/2017     Tobacco History:     History   Smoking Status     Former Smoker     Packs/day: 0.30     Years: 5.00     Types: Cigarettes     Quit date: 1/1/2005   Smokeless Tobacco     Never Used     Comment: 1/2010             Composite cancer screening  Chart review shows that this patient is due/due soon for the following None  Summary:    Patient is due/failing the following:   A1C    Action needed:   Seen for Diabetic check on 12/20/17.      Type of outreach:    None    Questions for provider review:    None                                                                                                                                    Monique Gonzalez MA       Chart routed to  .

## 2018-01-18 ENCOUNTER — TRANSFERRED RECORDS (OUTPATIENT)
Dept: HEALTH INFORMATION MANAGEMENT | Facility: CLINIC | Age: 48
End: 2018-01-18

## 2018-01-19 DIAGNOSIS — F41.9 ANXIETY: ICD-10-CM

## 2018-01-19 RX ORDER — VENLAFAXINE HYDROCHLORIDE 75 MG/1
CAPSULE, EXTENDED RELEASE ORAL
Qty: 90 CAPSULE | Refills: 0 | Status: SHIPPED | OUTPATIENT
Start: 2018-01-19

## 2018-01-19 NOTE — TELEPHONE ENCOUNTER
venlafaxine (EFFEXOR-XR) 75 MG 24 hr capsule      Last Written Prescription Date:  ?  Last Fill Quantity: ?,   # refills: ?  Last Office Visit: 12/20/2017  Future Office visit:       Routing refill request to provider for review/approval because:  Medication is reported/historical

## 2018-01-20 DIAGNOSIS — D50.9 IRON DEFICIENCY ANEMIA, UNSPECIFIED IRON DEFICIENCY ANEMIA TYPE: ICD-10-CM

## 2018-01-22 RX ORDER — BACILLUS COAGULANS 1B CELL
CAPSULE ORAL
Qty: 180 TABLET | Refills: 3 | Status: SHIPPED | OUTPATIENT
Start: 2018-01-22

## 2018-01-22 NOTE — TELEPHONE ENCOUNTER
ferrous fumarate 65 mg, [place]. FE,-Vitamin C 125 mg (VITRON C)  MG TABS tablet (Discontinued)      Last Written Prescription Date:  3/28/2017  Last Fill Quantity: 90,   # refills: 1  Last Office Visit: 12/20/2017  Future Office visit:       Routing refill request to provider for review/approval because:  Drug not active on patient's medication list

## 2018-03-23 ENCOUNTER — TRANSFERRED RECORDS (OUTPATIENT)
Dept: HEALTH INFORMATION MANAGEMENT | Facility: CLINIC | Age: 48
End: 2018-03-23

## 2018-04-06 ENCOUNTER — TELEPHONE (OUTPATIENT)
Dept: MIDWIFE SERVICES | Facility: CLINIC | Age: 48
End: 2018-04-06

## 2018-04-06 NOTE — TELEPHONE ENCOUNTER
Pharmacy sent a refill request for Fluconazole for the pt. Was last prescribed in January for vaginal itching. My Chart message sent to the pt to inquire if she requested this refill and to find out what symptoms she is having so that recommendations can be made. Denisse Rangel RN

## 2018-04-18 ENCOUNTER — DOCUMENTATION ONLY (OUTPATIENT)
Dept: LAB | Facility: CLINIC | Age: 48
End: 2018-04-18

## 2018-04-18 DIAGNOSIS — E78.5 HYPERLIPIDEMIA LDL GOAL <100: ICD-10-CM

## 2018-04-18 DIAGNOSIS — E78.5 HYPERLIPIDEMIA LDL GOAL <100: Primary | ICD-10-CM

## 2018-04-18 DIAGNOSIS — Z79.4 TYPE 2 DIABETES MELLITUS WITHOUT COMPLICATION, WITH LONG-TERM CURRENT USE OF INSULIN (H): ICD-10-CM

## 2018-04-18 DIAGNOSIS — E11.9 TYPE 2 DIABETES MELLITUS WITHOUT COMPLICATION, WITH LONG-TERM CURRENT USE OF INSULIN (H): ICD-10-CM

## 2018-04-18 DIAGNOSIS — E78.1 HYPERTRIGLYCERIDEMIA: ICD-10-CM

## 2018-04-18 PROCEDURE — 83036 HEMOGLOBIN GLYCOSYLATED A1C: CPT | Performed by: INTERNAL MEDICINE

## 2018-04-18 PROCEDURE — 84460 ALANINE AMINO (ALT) (SGPT): CPT | Performed by: INTERNAL MEDICINE

## 2018-04-18 PROCEDURE — 80048 BASIC METABOLIC PNL TOTAL CA: CPT | Performed by: INTERNAL MEDICINE

## 2018-04-18 PROCEDURE — 80061 LIPID PANEL: CPT | Performed by: INTERNAL MEDICINE

## 2018-04-18 PROCEDURE — 36415 COLL VENOUS BLD VENIPUNCTURE: CPT | Performed by: INTERNAL MEDICINE

## 2018-04-18 NOTE — PROGRESS NOTES
Patient had an appointment today 04/18/2018. I have no orders so I drew a few JIC tubes on her. Please review chart and send orders.    Thank you,  Zeenat Johnson

## 2018-04-19 LAB — HBA1C MFR BLD: 8.4 % (ref 0–5.6)

## 2018-04-20 LAB
ALT SERPL W P-5'-P-CCNC: 63 U/L (ref 0–50)
ANION GAP SERPL CALCULATED.3IONS-SCNC: 12 MMOL/L (ref 3–14)
BUN SERPL-MCNC: 9 MG/DL (ref 7–30)
CALCIUM SERPL-MCNC: 9.4 MG/DL (ref 8.5–10.1)
CHLORIDE SERPL-SCNC: 102 MMOL/L (ref 94–109)
CHOLEST SERPL-MCNC: 135 MG/DL
CO2 SERPL-SCNC: 22 MMOL/L (ref 20–32)
CREAT SERPL-MCNC: 0.53 MG/DL (ref 0.52–1.04)
GFR SERPL CREATININE-BSD FRML MDRD: >90 ML/MIN/1.7M2
GLUCOSE SERPL-MCNC: 159 MG/DL (ref 70–99)
HDLC SERPL-MCNC: 40 MG/DL
LDLC SERPL CALC-MCNC: 30 MG/DL
NONHDLC SERPL-MCNC: 95 MG/DL
POTASSIUM SERPL-SCNC: 4.1 MMOL/L (ref 3.4–5.3)
SODIUM SERPL-SCNC: 136 MMOL/L (ref 133–144)
TRIGL SERPL-MCNC: 326 MG/DL

## 2018-04-21 ENCOUNTER — TRANSFERRED RECORDS (OUTPATIENT)
Dept: HEALTH INFORMATION MANAGEMENT | Facility: CLINIC | Age: 48
End: 2018-04-21

## 2018-04-22 DIAGNOSIS — E11.9 TYPE 2 DIABETES MELLITUS WITHOUT COMPLICATION, WITH LONG-TERM CURRENT USE OF INSULIN (H): Primary | ICD-10-CM

## 2018-04-22 DIAGNOSIS — Z79.4 TYPE 2 DIABETES MELLITUS WITHOUT COMPLICATION, WITH LONG-TERM CURRENT USE OF INSULIN (H): Primary | ICD-10-CM

## 2018-04-23 LAB
C TRACH DNA SPEC QL PROBE+SIG AMP: NEGATIVE
HPV ABSTRACT: NORMAL
N GONORRHOEA DNA SPEC QL PROBE+SIG AMP: NEGATIVE
PAP-ABSTRACT: NORMAL
SPECIMEN DESCRIP: NORMAL
SPECIMEN DESCRIPTION: NORMAL

## 2018-04-24 NOTE — TELEPHONE ENCOUNTER
2 tabs 2 times a day approved. Patient has follow up appointment with primary care physician in a week. Can address medication reconciliation at this point     Wilber Smith MD

## 2018-05-15 ENCOUNTER — TELEPHONE (OUTPATIENT)
Dept: PHARMACY | Facility: CLINIC | Age: 48
End: 2018-05-15

## 2018-05-15 NOTE — TELEPHONE ENCOUNTER
We have been unable to reach this patient for MTM follow-up after several attempts. We will stop reaching out to the patient at this time. Please let us know if we can assist in this patient's care in the future.    Routing to PCP as Marti SharmaD, [facility]  Medication Therapy Management Provider  Pager: [phone]

## 2018-05-20 ENCOUNTER — HEALTH MAINTENANCE LETTER (OUTPATIENT)
Age: 48
End: 2018-05-20

## 2018-06-25 ENCOUNTER — HEALTH MAINTENANCE LETTER (OUTPATIENT)
Age: 48
End: 2018-06-25

## 2018-07-18 ENCOUNTER — TRANSFERRED RECORDS (OUTPATIENT)
Dept: HEALTH INFORMATION MANAGEMENT | Facility: CLINIC | Age: 48
End: 2018-07-18

## 2018-07-27 DIAGNOSIS — Z79.4 TYPE 2 DIABETES MELLITUS WITH HYPERGLYCEMIA, WITH LONG-TERM CURRENT USE OF INSULIN (H): ICD-10-CM

## 2018-07-27 DIAGNOSIS — Z79.4 TYPE 2 DIABETES MELLITUS WITHOUT COMPLICATION, WITH LONG-TERM CURRENT USE OF INSULIN (H): ICD-10-CM

## 2018-07-27 DIAGNOSIS — E11.65 TYPE 2 DIABETES MELLITUS WITH HYPERGLYCEMIA, WITH LONG-TERM CURRENT USE OF INSULIN (H): ICD-10-CM

## 2018-07-27 DIAGNOSIS — E11.9 TYPE 2 DIABETES MELLITUS WITHOUT COMPLICATION, WITH LONG-TERM CURRENT USE OF INSULIN (H): ICD-10-CM

## 2018-07-27 LAB
CHOLEST SERPL-MCNC: 130 MG/DL (ref 0–199)
CREAT SERPL-MCNC: 0.53 MG/DL (ref 0.55–1.02)
GFR SERPL CREATININE-BSD FRML MDRD: >60 ML/MIN/1.73M2
HBA1C MFR BLD: 9.1 % (ref 4–5.6)
HDLC SERPL-MCNC: 35 MG/DL
LDLC SERPL CALC-MCNC: 51 MG/DL (ref 19–130)
NONHDLC SERPL-MCNC: 95 MG/DL (ref 0–159)
TRIGL SERPL-MCNC: 218 MG/DL (ref 4–149)

## 2018-07-27 RX ORDER — FLURBIPROFEN SODIUM 0.3 MG/ML
SOLUTION/ DROPS OPHTHALMIC
Qty: 300 EACH | Refills: 2 | Status: SHIPPED | OUTPATIENT
Start: 2018-07-27

## 2018-07-27 RX ORDER — INSULIN GLARGINE 100 [IU]/ML
50 INJECTION, SOLUTION SUBCUTANEOUS DAILY
Qty: 30 ML | Refills: 0 | Status: SHIPPED | OUTPATIENT
Start: 2018-07-27

## 2018-07-27 NOTE — TELEPHONE ENCOUNTER
Routing refill request to provider for review/approval because:  Patient needs to be seen because:  Due for diabetic check, see previous refill encounter, staff are currently reaching out to patient to schedule.   Donya Lagos RN

## 2018-08-07 LAB
ALT SERPL-CCNC: 56 U/L (ref 9–55)
AST SERPL-CCNC: 61 U/L (ref 10–40)
TSH SERPL-ACNC: 0.92 UIU/ML (ref 0.3–4.5)

## 2018-08-11 ENCOUNTER — TRANSFERRED RECORDS (OUTPATIENT)
Dept: HEALTH INFORMATION MANAGEMENT | Facility: CLINIC | Age: 48
End: 2018-08-11

## 2018-08-11 LAB — HEP C HIM: NORMAL

## 2018-08-22 DIAGNOSIS — E11.9 TYPE 2 DIABETES MELLITUS WITHOUT COMPLICATION, WITH LONG-TERM CURRENT USE OF INSULIN (H): ICD-10-CM

## 2018-08-22 DIAGNOSIS — Z79.4 TYPE 2 DIABETES MELLITUS WITHOUT COMPLICATION, WITH LONG-TERM CURRENT USE OF INSULIN (H): ICD-10-CM

## 2018-08-22 NOTE — TELEPHONE ENCOUNTER
"Routing refill request to provider for review/approval because:  Labs not current:    Patient needs to be seen because:  Per [facility] protocol needs to be seen every 6 months      Requested Prescriptions   Pending Prescriptions Disp Refills     metFORMIN (GLUCOPHAGE) 500 MG tablet [Pharmacy Med Name: METFORMIN 500MG TABLETS]  Last Written Prescription Date:  5/23/18  Last Fill Quantity: 120,  # refills: 1   Last office visit: 4/26/2017 with prescribing provider:     Future Office Visit:     120 tablet 0     Sig: TAKE 2 TABLETS BY MOUTH TWICE DAILY WITH MEALS    Biguanide Agents Failed    8/22/2018  9:09 AM       Failed - Blood pressure less than 140/90 in past 6 months    BP Readings from Last 3 Encounters:   12/20/17 130/82   11/15/17 121/80   10/23/17 128/76                Failed - Patient has documented A1c within the specified period of time.    If HgbA1C is 8 or greater, it needs to be on file within the past 3 months.  If less than 8, must be on file within the past 6 months.     Recent Labs   Lab Test  04/18/18   1045   A1C  8.4*            Failed - Recent (6 mo) or future (30 days) visit within the authorizing provider's specialty    Patient had office visit in the last 6 months or has a visit in the next 30 days with authorizing provider or within the authorizing provider's specialty.  See \"Patient Info\" tab in inbasket, or \"Choose Columns\" in Meds & Orders section of the refill encounter.           Passed - Patient has documented LDL within the past 12 mos.    Recent Labs   Lab Test  04/18/18   1045   LDL  30            Passed - Patient has had a Microalbumin in the past 12 mos.    Recent Labs   Lab Test  09/16/17   0918   MICROL  31   UMALCR  36.88*            Passed - Patient is age 10 or older       Passed - Patient's CR is NOT>1.4 OR Patient's EGFR is NOT<45 within past 12 mos.    Recent Labs   Lab Test  04/18/18   1045  09/18/13   GFR   --    --   92   GFRB   --    --   108   GFRESTIMATED  >90   < >   --  "   GFRESTBLACK  >90   < >   --     < > = values in this interval not displayed.       Recent Labs   Lab Test  04/18/18   1045  09/18/13   CR  0.53   < >   --    CRPOC   --    --   0.7    < > = values in this interval not displayed.            Passed - Patient does NOT have a diagnosis of CHF.       Passed - Patient is not pregnant       Passed - Patient has not had a positive pregnancy test within the past 12 mos.         Virginia Roque RN - BC

## 2018-08-24 DIAGNOSIS — E78.5 HYPERLIPIDEMIA LDL GOAL <100: ICD-10-CM

## 2018-08-24 RX ORDER — ATORVASTATIN CALCIUM 40 MG/1
TABLET, FILM COATED ORAL
Qty: 90 TABLET | Refills: 1 | Status: SHIPPED | OUTPATIENT
Start: 2018-08-24

## 2018-08-24 NOTE — TELEPHONE ENCOUNTER
Signed Prescriptions:                        Disp   Refills    atorvastatin (LIPITOR) 40 MG tablet        90 tab*1        Sig: TAKE ONE TABLET BY MOUTH DAILY  Authorizing Provider: MIKI ABRAHAM  Ordering User: ARIE HAWKINS RN refilled medication per [facility] Refill Protocol.     Arie Hawkins RN

## 2018-09-11 DIAGNOSIS — Z79.4 TYPE 2 DIABETES MELLITUS WITHOUT COMPLICATION, WITH LONG-TERM CURRENT USE OF INSULIN (H): ICD-10-CM

## 2018-09-11 DIAGNOSIS — E11.9 TYPE 2 DIABETES MELLITUS WITHOUT COMPLICATION, WITH LONG-TERM CURRENT USE OF INSULIN (H): ICD-10-CM

## 2018-09-11 NOTE — LETTER
[facility]  [address] [place] [address] [address]  [facility] [address] [phone]          September 19, 2018    Maranda Mallory                                                                                                                    [address]    [facility] [address]            Dear Maranda,    We have tried to reach you by phone, but have been unable to do so.    We received a refill request for the HUMALOG KWIKPEN 100 UNIT/ML soln, however, we are unable to refill this as you are overdue for an office visit for your diabetes.    Please call [phone], to schedule an appointment. Feel free to contact us with any questions or concerns.  Thank you.    Sincerely,         Zainab Aguilar MD/uche

## 2018-09-12 NOTE — TELEPHONE ENCOUNTER
"Routing refill request to provider for review/approval because:  Failed [facility] refill protocol, see below:        Requested Prescriptions   Pending Prescriptions Disp Refills     HUMALOG KWIKPEN 100 UNIT/ML soln [Pharmacy Med Name: HUMALOG 100 U/ML KWIK PEN INJ 3ML]  Last Written Prescription Date:  6/6/18  Last Fill Quantity: 54 mL,  # refills: 0   Last office visit: 12/20/2017 with prescribing provider:     Future Office Visit:     54 mL 0     Sig: ADMINISTER 20 UNITS UNDER THE SKIN THREE TIMES DAILY WITH MEALS    Short Acting Insulin Protocol Failed    9/11/2018 11:24 AM       Failed - Blood pressure less than 140/90 in past 6 months    BP Readings from Last 3 Encounters:   12/20/17 130/82   11/15/17 121/80   10/23/17 128/76                Failed - HgbA1C in past 3 or 6 months    If HgbA1C is 8 or greater, it needs to be on file within the past 3 months.  If less than 8, must be on file within the past 6 months.     Recent Labs   Lab Test  04/18/18   1045   A1C  8.4*            Failed - Recent (6 mo) or future (30 days) visit within the authorizing provider's specialty    Patient had office visit in the last 6 months or has a visit in the next 30 days with authorizing provider or within the authorizing provider's specialty.  See \"Patient Info\" tab in inbasket, or \"Choose Columns\" in Meds & Orders section of the refill encounter.           Passed - LDL on file in past 12 months    Recent Labs   Lab Test  04/18/18   1045   LDL  30            Passed - Microalbumin on file in past 12 months    Recent Labs   Lab Test  09/16/17   0918   MICROL  31   UMALCR  36.88*            Passed - Serum creatinine on file in past 12 months    Recent Labs   Lab Test  04/18/18   1045  09/18/13   CR  0.53   < >   --    CRPOC   --    --   0.7    < > = values in this interval not displayed.            Passed - Patient is age 18 or older        Virginia Roque, RN - BC      "

## 2018-09-13 NOTE — TELEPHONE ENCOUNTER
Called patient and left VM to call clinic to set up DM appointment with provider.   Patrica MAYFIELD CMA ([facility])

## 2018-09-18 NOTE — TELEPHONE ENCOUNTER
Called patient and left message to call pink team back, x3.     Lacy HARDY CMA ([facility])

## 2018-09-19 RX ORDER — INSULIN LISPRO 100 [IU]/ML
INJECTION, SOLUTION INTRAVENOUS; SUBCUTANEOUS
Qty: 54 ML | Refills: 0
Start: 2018-09-19

## 2018-09-19 NOTE — TELEPHONE ENCOUNTER
Multiple attempts made to contact patient. RN, please address refill and route to TC to mail letter.  Patrica MAYFIELD CMA ([facility])

## 2018-12-28 ENCOUNTER — TRANSFERRED RECORDS (OUTPATIENT)
Dept: HEALTH INFORMATION MANAGEMENT | Facility: CLINIC | Age: 48
End: 2018-12-28

## 2019-07-11 NOTE — TELEPHONE ENCOUNTER
Notify patient    Per DR. Aguilar:  Victoza prescription has been sent but Dr. Aguilar needs a wet prep lab completed first before she can send in diflucan.  Need to confirm diagnosis    Orders placed.   Need lab appointment     Nguyễn Howe RN     Was the patient seen in the last year in this department? Yes  LOV 10/16/18    Does patient have an active prescription for medications requested? No     Received Request Via: Pharmacy

## 2019-11-06 ENCOUNTER — HEALTH MAINTENANCE LETTER (OUTPATIENT)
Age: 49
End: 2019-11-06

## 2020-08-13 ENCOUNTER — HOME INFUSION (PRE-WILLOW HOME INFUSION) (OUTPATIENT)
Dept: PHARMACY | Facility: CLINIC | Age: 50
End: 2020-08-13

## 2020-08-14 NOTE — PROGRESS NOTES
This is a recent snapshot of the patient's [facility] medical record.  For current drug dose and complete information and questions, call [phone] or In [facility] pool, fv home infusion ([id number])  CSN Number:  [id number]

## 2020-08-15 ENCOUNTER — HOME INFUSION (PRE-WILLOW HOME INFUSION) (OUTPATIENT)
Dept: PHARMACY | Facility: CLINIC | Age: 50
End: 2020-08-15

## 2020-08-17 NOTE — PROGRESS NOTES
This is a recent snapshot of the patient's [facility] medical record.  For current drug dose and complete information and questions, call [phone] or In Basket pool, fv home infusion ([id number])  CSN Number:  [id number]

## 2020-08-18 ENCOUNTER — HOME INFUSION (PRE-WILLOW HOME INFUSION) (OUTPATIENT)
Dept: PHARMACY | Facility: CLINIC | Age: 50
End: 2020-08-18

## 2020-08-19 NOTE — PROGRESS NOTES
This is a recent snapshot of the patient's [facility] medical record.  For current drug dose and complete information and questions, call [phone] or In Basket pool, fv home infusion ([id number])  CSN Number:  [id number]

## 2020-08-20 ENCOUNTER — HOME INFUSION (PRE-WILLOW HOME INFUSION) (OUTPATIENT)
Dept: PHARMACY | Facility: CLINIC | Age: 50
End: 2020-08-20

## 2020-08-26 ENCOUNTER — HOME INFUSION (PRE-WILLOW HOME INFUSION) (OUTPATIENT)
Dept: PHARMACY | Facility: CLINIC | Age: 50
End: 2020-08-26

## 2020-08-27 NOTE — PROGRESS NOTES
This is a recent snapshot of the patient's [facility] medical record.  For current drug dose and complete information and questions, call [phone] or In Basket pool, fv home infusion ([id number])  CSN Number:  [id number]

## 2020-11-29 ENCOUNTER — HEALTH MAINTENANCE LETTER (OUTPATIENT)
Age: 50
End: 2020-11-29

## 2021-02-14 ENCOUNTER — HEALTH MAINTENANCE LETTER (OUTPATIENT)
Age: 51
End: 2021-02-14

## 2021-05-30 ENCOUNTER — HEALTH MAINTENANCE LETTER (OUTPATIENT)
Age: 51
End: 2021-05-30

## 2021-09-19 ENCOUNTER — HEALTH MAINTENANCE LETTER (OUTPATIENT)
Age: 51
End: 2021-09-19

## 2022-01-09 ENCOUNTER — HEALTH MAINTENANCE LETTER (OUTPATIENT)
Age: 52
End: 2022-01-09

## 2022-01-28 NOTE — MR AVS SNAPSHOT
After Visit Summary   10/19/2017    Maranda Mallory    MRN: [id number]           Patient Information     Date Of Birth          1970        Visit Information        Provider Department      10/19/2017 1:30 PM Lena Bedolla, [facility] MTM        Today's Diagnoses     Type 2 diabetes mellitus without complication, with long-term current use of insulin (H)          Care Instructions    Recommendations from today's MTM visit:                                                    MTM (medication therapy management) is a service provided by a clinical pharmacist designed to help you get the most of out of your medicines.   Today we reviewed what your medicines are for, how to know if they are working, that your medicines are safe and how to make your medicine regimen as easy as possible.     1.  Increase Basaglar to 40 units daily.  If your sugars are still high between now and Sunday - go ahead and increase Basaglar to 44 units starting on Sunday.    Next MTM visit:  Please send me blood sugar readings via [employer] every week    To schedule another MTM appointment, please call the clinic directly or you may call the MTM scheduling line at [phone] or toll-free at [phone].     My Clinical Pharmacist's contact information:                                                      It was a pleasure seeing you today!  Please feel free to contact me with any questions or concerns you have.      Lena Bedolla, PharmD, [facility]  Medication Therapy Management Provider  Pager: [phone]     You may receive a survey about the MTM services you received.  I would appreciate your feedback to help me serve you better in the future. Please fill it out and return it when you can. Your comments will be anonymous.                     Follow-ups after your visit        Your next 10 appointments already scheduled     Oct 19, 2017  3:15 PM CDT   SHORT with Jenny Melendrez [facility]  [place] ([facility]    [address] [place] [address] [address]  [facility] [id number] [phone]            Oct 23, 2017  6:00 PM CDT   Office Visit with Zainab Aguilar MD   [facility] ([facility]    [address] [place] [address]  [place] [id number] [phone]           Bring a current list of meds and any records pertaining to this visit. For Physicals, please bring immunization records and any forms needing to be filled out. Please arrive 10 minutes early to complete paperwork.              Who to contact     If you have questions or need follow up information about today's clinic visit or your schedule please contact [facility] MTM directly at [phone].  Normal or non-critical lab and imaging results will be communicated to you by [employer], letter or phone within 4 business days after the clinic has received the results. If you do not hear from us within 7 days, please contact the clinic through [employer] or phone. If you have a critical or abnormal lab result, we will notify you by phone as soon as possible.  Submit refill requests through [employer] or call your pharmacy and they will forward the refill request to us. Please allow 3 business days for your refill to be completed.          Additional Information About Your Visit        [employer] Information     [employer] gives you secure access to your electronic health record. If you see a primary care provider, you can also send messages to your care team and make appointments. If you have questions, please call your primary care clinic.  If you do not have a primary care provider, please call [phone] and they will assist you.        Care EveryWhere ID     This is your Care EveryWhere ID. This could be used by other organizations to access your [place] medical records  [id number]        Your Vitals Were     Last Period                   09/29/2017            Blood Pressure from Last 3 Encounters:    10/12/17 127/88   04/26/17 122/84   04/18/17 143/90    Weight from Last 3 Encounters:   10/12/17 237 lb 3.2 oz (107.6 kg)   04/26/17 238 lb 9.6 oz (108.2 kg)   04/18/17 241 lb 3.2 oz (109.4 kg)              Today, you had the following     No orders found for display         Today's Medication Changes          These changes are accurate as of: 10/19/17  1:47 PM.  If you have any questions, ask your nurse or doctor.               These medicines have changed or have updated prescriptions.        Dose/Directions    BASAGLAR 100 UNIT/ML injection   This may have changed:  how much to take   Used for:  Type 2 diabetes mellitus without complication, with long-term current use of insulin (H)   Changed by:  Lena Bedolla, [facility]        Dose:  40-44 Units   Inject 40-44 Units Subcutaneous daily   Quantity:  35 mL   Refills:  3            Where to get your medicines      Some of these will need a paper prescription and others can be bought over the counter.  Ask your nurse if you have questions.     You don't need a prescription for these medications     BASAGLAR 100 UNIT/ML injection                Primary Care Provider Office Phone # Fax #    Zainab Olga Aguilar [phone] [address] [facility] [address]        Equal Access to Services     CLAUDIA PERDUE AH: Hadii radha ku hadasho Soomaali, waaxda luqadaha, qaybta kaalmada adeegyada, janna mukherjee hayfabián august. So [facility] [phone].    ATENCIÓN: Si habla español, tiene a ramsay disposición servicios gratuitos de asistencia lingüística. Llame al [phone].    We comply with applicable federal civil rights laws and [place] laws. We do not discriminate on the basis of race, color, national origin, age, disability, sex, sexual orientation, or gender identity.            Thank you!     Thank you for choosing [facility]  for your care. Our goal is always to provide you with excellent care. Hearing back from our patients  is one way we can continue to improve our services. Please take a few minutes to complete the written survey that you may receive in the mail after your visit with us. Thank you!             Your Updated Medication List - Protect others around you: Learn how to safely use, store and throw away your medicines at www.disposemymeds.org.          This list is accurate as of: 10/19/17  1:47 PM.  Always use your most recent med list.                   Brand Name Dispense Instructions for use Diagnosis    aspirin 81 MG tablet     30 tablet    Take 81 mg by mouth daily    Hyperlipidemia LDL goal <100       atorvastatin 40 MG tablet    LIPITOR    90 tablet    TAKE ONE TABLET BY MOUTH DAILY    Hyperlipidemia LDL goal <100       BASAGLAR 100 UNIT/ML injection     35 mL    Inject 40-44 Units Subcutaneous daily    Type 2 diabetes mellitus without complication, with long-term current use of insulin (H)       blood glucose calibration solution     1 each    Use to calibrate blood glucose monitor as directed.    Type 2 diabetes, HbA1c goal < 7% (H)       blood glucose lancets standard    no brand specified    3 Box    Use to test blood sugar 3 times daily or as directed.    Type 2 diabetes mellitus without complication (H)       blood glucose monitoring meter device kit    no brand specified    1 kit    Use to test blood sugar 3 times daily or as directed    Type 2 diabetes mellitus without complication (H)       blood glucose monitoring test strip    no brand specified    300 strip    Use to test blood sugars 3 times daily or as directed    Type 2 diabetes mellitus without complication (H)       carBAMazepine 200 MG 12 hr capsule    CARBATROL     Take 200 mg by mouth 2 times daily        diltiazem 120 MG 24 hr capsule    CARDIZEM CD    30 capsule    Take 1 capsule (120 mg) by mouth daily    Paroxysmal supraventricular tachycardia (H)       insulin lispro 100 UNIT/ML injection    HumaLOG KWIKpen    45 mL    Inject 15 Units  Subcutaneous 3 times daily (with meals) . Insulin Pens    Type 2 diabetes mellitus without complication (H)       insulin pen needle 31G X 6 MM     300 each    Use 4 daily or as directed.    Type 2 diabetes mellitus with hyperglycemia, with long-term current use of insulin (H)       liraglutide 18 MG/3ML soln    VICTOZA    9 mL    Start with 0.6 MG daily for 1 week, then 1.2 MG daily for a week, and then 1.8 MG daily    Type 2 diabetes mellitus without complication (H)       metFORMIN 500 MG 24 hr tablet    GLUCOPHAGE-XR    120 tablet    Take 1 tablet (500 mg) by mouth daily (with dinner)    Type 2 diabetes mellitus without complication, with long-term current use of insulin (H)       metroNIDAZOLE 500 MG tablet    FLAGYL    14 tablet    Take 1 tablet (500 mg) by mouth 2 times daily    BV (bacterial vaginosis)       OCREVUS IV      Inject into the vein every 6 months        omeprazole 40 MG capsule    priLOSEC    180 capsule    TAKE ONE CAPSULE BY MOUTH TWICE DAILY    Gastroesophageal reflux disease, esophagitis presence not specified       Urea 20 % Crea cream     120 g    To dry and scaly feet twice a day as needed.    Skin cancer screening       venlafaxine 75 MG 24 hr capsule    EFFEXOR-XR     Take 75 mg by mouth daily        vitamin D 34263 UNIT capsule    ERGOCALCIFEROL     Take 50,000 Units by mouth twice a week           Yes

## 2022-02-17 PROBLEM — R74.01 NONSPECIFIC ELEVATION OF LEVELS OF TRANSAMINASE AND LACTIC ACID DEHYDROGENASE (LDH): Status: ACTIVE | Noted: 2017-01-02

## 2022-02-17 PROBLEM — R74.02 NONSPECIFIC ELEVATION OF LEVELS OF TRANSAMINASE AND LACTIC ACID DEHYDROGENASE (LDH): Status: ACTIVE | Noted: 2017-01-02

## 2022-03-06 ENCOUNTER — HEALTH MAINTENANCE LETTER (OUTPATIENT)
Age: 52
End: 2022-03-06

## 2022-08-21 ENCOUNTER — HEALTH MAINTENANCE LETTER (OUTPATIENT)
Age: 52
End: 2022-08-21

## 2022-11-21 ENCOUNTER — HEALTH MAINTENANCE LETTER (OUTPATIENT)
Age: 52
End: 2022-11-21

## 2023-04-16 ENCOUNTER — HEALTH MAINTENANCE LETTER (OUTPATIENT)
Age: 53
End: 2023-04-16

## 2023-11-26 ENCOUNTER — HEALTH MAINTENANCE LETTER (OUTPATIENT)
Age: 53
End: 2023-11-26

## 2024-03-14 NOTE — TELEPHONE ENCOUNTER
Letter sent to patient asking her to contact her insurance company to find out which medication is covered. Cristel Coleman,      Detail Level: Simple Instructions: This plan will send the code FBSE to the PM system.  DO NOT or CHANGE the price. Price (Do Not Change): 0.00

## 2024-12-08 NOTE — MR AVS SNAPSHOT
After Visit Summary   4/18/2017    Maranda Mallory    MRN: [id number]           Patient Information     Date Of Birth          1970        Visit Information        Provider Department      4/18/2017 10:00 AM Patrica Rhodes MD [facility] [place]        Today's Diagnoses     Encounter for gynecological examination with abnormal finding    -  1    Candidiasis of vulva and vagina        HSIL (high grade squamous intraepithelial lesion) on Pap smear of cervix        BV (bacterial vaginosis)           Follow-ups after your visit        Your next 10 appointments already scheduled     May 23, 2017  1:30 PM CDT   (Arrive by 1:15 PM)   Return Visit with Rod Purcell MD   [facility] D[facility] ([facility])    [address] [place] [id number] [phone]            Jun 08, 2017  1:00 PM CDT   (Arrive by 12:45 PM)   MR BRAIN W/O & W CONTRAST with [address] MRI ([facility])    [address] [place] [address] [id number] [phone]           Take your medicines as usual, unless your doctor tells you not to. Bring a list of your current medicines to your exam (including vitamins, minerals and over-the-counter drugs).  You will be given intravenous contrast for this exam. To prepare:   The day before your exam, drink extra fluids at least six 8-ounce glasses (unless your doctor tells you to restrict your fluids).   Have a blood test (creatinine test) within 30 days of your exam. Go to your clinic or Diagnostic Imaging Department for this test.  The MRI machine uses a strong magnet. Please wear clothes without metal (snaps, zippers). A sweatsuit works well, or we may give you a hospital gown.  Please remove any body piercings and hair extensions before you arrive. You will also remove watches, jewelry, hairpins, wallets, dentures, partial dental plates and hearing aids. You may  wear contact lenses, and you may be able to wear your rings. We have a safe place to keep your personal items, but it is safer to leave them at home.   **IMPORTANT** THE INSTRUCTIONS BELOW ARE ONLY FOR THOSE PATIENTS WHO HAVE BEEN TOLD THEY WILL RECEIVE SEDATION OR GENERAL ANESTHESIA DURING THEIR MRI PROCEDURE:  IF YOU WILL RECEIVE SEDATION (take medicine to help you relax during your exam):   You must get the medicine from your doctor before you arrive. Bring the medicine to the exam. Do not take it at home.   Arrive one hour early. Bring someone who can take you home after the test. Your medicine will make you sleepy. After the exam, you may not drive, take a bus or take a taxi by yourself.   No eating 8 hours before your exam. You may have clear liquids up until 4 hours before your exam. (Clear liquids include water, clear tea, black coffee and fruit juice without pulp.)  IF YOU WILL RECEIVE ANESTHESIA (be asleep for your exam):   Arrive 1 1/2 hours early. Bring someone who can take you home after the test. You may not drive, take a bus or take a taxi by yourself.   No eating 8 hours before your exam. You may have clear liquids up until 4 hours before your exam. (Clear liquids include water, clear tea, black coffee and fruit juice without pulp.)  Please call the Imaging Department at your exam site with any questions.            Jun 08, 2017  1:45 PM CDT   (Arrive by 1:30 PM)   MR CERVICAL SPINE W/O & W CONTRAST with [address] [facility] ([facility])    [address] [id number] [phone]           Take your medicines as usual, unless your doctor tells you not to. Bring a list of your current medicines to your exam (including vitamins, minerals and over-the-counter drugs).  You will be given intravenous contrast for this exam. To prepare:   The day before your exam, drink extra fluids at least six 8-ounce glasses (unless your doctor  tells you to restrict your fluids).   Have a blood test (creatinine test) within 30 days of your exam. Go to your clinic or Diagnostic Imaging Department for this test.  The MRI machine uses a strong magnet. Please wear clothes without metal (snaps, zippers). A sweatsuit works well, or we may give you a hospital gown.  Please remove any body piercings and hair extensions before you arrive. You will also remove watches, jewelry, hairpins, wallets, dentures, partial dental plates and hearing aids. You may wear contact lenses, and you may be able to wear your rings. We have a safe place to keep your personal items, but it is safer to leave them at home.   **IMPORTANT** THE INSTRUCTIONS BELOW ARE ONLY FOR THOSE PATIENTS WHO HAVE BEEN TOLD THEY WILL RECEIVE SEDATION OR GENERAL ANESTHESIA DURING THEIR MRI PROCEDURE:  IF YOU WILL RECEIVE SEDATION (take medicine to help you relax during your exam):   You must get the medicine from your doctor before you arrive. Bring the medicine to the exam. Do not take it at home.   Arrive one hour early. Bring someone who can take you home after the test. Your medicine will make you sleepy. After the exam, you may not drive, take a bus or take a taxi by yourself.   No eating 8 hours before your exam. You may have clear liquids up until 4 hours before your exam. (Clear liquids include water, clear tea, black coffee and fruit juice without pulp.)  IF YOU WILL RECEIVE ANESTHESIA (be asleep for your exam):   Arrive 1 1/2 hours early. Bring someone who can take you home after the test. You may not drive, take a bus or take a taxi by yourself.   No eating 8 hours before your exam. You may have clear liquids up until 4 hours before your exam. (Clear liquids include water, clear tea, black coffee and fruit juice without pulp.)  Please call the Imaging Department at your exam site with any questions.            Jun 08, 2017  2:30 PM CDT   (Arrive by 2:15 PM)   Return Multiple Sclerosis with  "Carlos Hunt MD   [facility] Multiple Sclerosis ([facility])    [address] [facility]  3rd Floor  [facility] [address] [phone]              Who to contact     If you have questions or need follow up information about today's clinic visit or your schedule please contact [facility] directly at [phone].  Normal or non-critical lab and imaging results will be communicated to you by MyChart, letter or phone within 4 business days after the clinic has received the results. If you do not hear from us within 7 days, please contact the clinic through [facility] or phone. If you have a critical or abnormal lab result, we will notify you by phone as soon as possible.  Submit refill requests through [facility] or call your pharmacy and they will forward the refill request to us. Please allow 3 business days for your refill to be completed.          Additional Information About Your Visit        [facility] Information     [facility] gives you secure access to your electronic health record. If you see a primary care provider, you can also send messages to your care team and make appointments. If you have questions, please call your primary care clinic.  If you do not have a primary care provider, please call [phone] and they will assist you.        Care EveryWhere ID     This is your Care EveryWhere ID. This could be used by other organizations to access your [facility] medical records  [id number]        Your Vitals Were     Pulse Temperature Height Last Period BMI (Body Mass Index)       96 97.7  F (36.5  C) (Oral) 5' 7.75\" (1.721 m) 03/20/2017 (Exact Date) 36.95 kg/m2        Blood Pressure from Last 3 Encounters:   04/18/17 143/90   03/24/17 122/78   01/02/17 134/80    Weight from Last 3 Encounters:   04/18/17 241 lb 3.2 oz (109.4 kg)   03/24/17 243 lb 6.4 oz (110.4 kg)   01/02/17 239 lb (108.4 kg)              We Performed the Following     CHLAMYDIA TRACHOMATIS PCR     " HPV High Risk Types DNA Cervical     NEISSERIA GONORRHOEA PCR     Pap imaged thin layer diagnostic with HPV (select HPV order below)     UA reflex to Microscopic and Culture     Urine Microscopic     Wet prep          Today's Medication Changes          These changes are accurate as of: 4/18/17 10:56 AM.  If you have any questions, ask your nurse or doctor.               Start taking these medicines.        Dose/Directions    metroNIDAZOLE 500 MG tablet   Commonly known as:  FLAGYL   Used for:  BV (bacterial vaginosis)   Started by:  Patrica Rhodes MD        Dose:  500 mg   Take 1 tablet (500 mg) by mouth 2 times daily   Quantity:  14 tablet   Refills:  0            Where to get your medicines      These medications were sent to [facility] [address] [place], [place] [address] [facility]  [address] [facility] [address] , [facility] [address]     Phone:  [phone]     metroNIDAZOLE 500 MG tablet                Primary Care Provider Office Phone # Fax #    Zainab Aguilar [phone]       [address] [address]        Thank you!     Thank you for [place] [facility]  [place]. Our goal is always to provide you with excellent care. Hearing back from our patients is one way we can continue to improve our services. Please take a few minutes to complete the written survey that you may receive in the mail after your visit with us. Thank you!             Your Updated Medication List - Protect others around you: Learn how to safely use, store and throw away your medicines at www.disposemymeds.org.          This list is accurate as of: 4/18/17 10:56 AM.  Always use your most recent med list.                   Brand Name Dispense Instructions for use    ACE NOT PRESCRIBED (INTENTIONAL)     0 each    ACE Inhibitor not prescribed due to Other:no microalbuminuria       aspirin 81 MG tablet     30 tablet     Take by mouth daily       atorvastatin 40 MG tablet    LIPITOR    90 tablet    Take 1 tablet (40 mg) by mouth daily       AUBAGIO 14 MG tablet   Generic drug:  teriflunomide      Take by mouth daily Only available through select specialty pharmacies       blood glucose calibration solution     1 each    Use to calibrate blood glucose monitor as directed.       blood glucose monitoring lancets     3 Box    Use to test blood sugars 3 times daily or as directed.       blood glucose monitoring meter device kit    no brand specified    1 kit    Use to test blood sugar 1 times daily or as directed       blood glucose monitoring test strip    ONE TOUCH ULTRA    300 strip    Use to test blood sugars 3 times daily or as directed.       diltiazem 120 MG 24 hr capsule    CARDIZEM CD    30 capsule    Take 1 capsule (120 mg) by mouth daily       ferrous fumarate 65 mg ([place]. FE)-Vitamin C 125 mg  MG Tabs tablet    VITRON C    90 tablet    Take 1 tablet by mouth daily       fluconazole 150 MG tablet    DIFLUCAN    1 tablet    TAKE 1 TABLET BY MOUTH ONCE FOR 1 DOSE- REPEAT IN 3 DAYS IF SYMPTOMS PERSIST       insulin aspart 100 UNIT/ML injection    NovoLOG FLEXPEN    3 Month    Inject 15 Units Subcutaneous 3 times daily (with meals)       insulin degludec 100 UNIT/ML pen    TRESIBA    36 mL    Inject 36 units once daily       * insulin pen needle 32G X 4 MM    BD SAMANTHA U/F    100 each    Use once daily or as directed.       * insulin pen needle 29G X 12.7MM    BD ULTRA-FINE    100 each    Use once daily with victoza pen       * insulin pen needle 31G X 6 MM     300 each    Use 4 daily or as directed.       liraglutide 18 MG/3ML soln    VICTOZA    9 mL    Start with 0.6 MG daily for 1 week, then 1.2 MG daily for a week, and then 1.8 MG daily       metFORMIN 500 MG 24 hr tablet    GLUCOPHAGE-XR    120 tablet    Take 2 tablets (1,000 mg) by mouth 2 times daily (with meals)       metroNIDAZOLE 500 MG tablet    FLAGYL    14 tablet     Take 1 tablet (500 mg) by mouth 2 times daily       omeprazole 40 MG capsule    priLOSEC    180 capsule    TAKE ONE CAPSULE BY MOUTH TWICE DAILY       SENNA-PLUS PO      Take by mouth 2 times daily 2 tabs       TEGRETOL PO      Take 50 mg by mouth       Urea 20 % Crea cream     120 g    To dry and scaly feet twice a day as needed.       venlafaxine 150 MG 24 hr capsule    EFFEXOR-XR    90 capsule    Take 1 capsule (150 mg) by mouth daily       vitamin D 2000 UNITS tablet     180 tablet    Take 4,000 Units by mouth daily       * Notice:  This list has 3 medication(s) that are the same as other medications prescribed for you. Read the directions carefully, and ask your doctor or other care provider to review them with you.        used